# Patient Record
Sex: FEMALE | Race: WHITE | NOT HISPANIC OR LATINO
[De-identification: names, ages, dates, MRNs, and addresses within clinical notes are randomized per-mention and may not be internally consistent; named-entity substitution may affect disease eponyms.]

---

## 2018-09-10 ENCOUNTER — RESULT REVIEW (OUTPATIENT)
Age: 63
End: 2018-09-10

## 2021-01-14 ENCOUNTER — RESULT REVIEW (OUTPATIENT)
Age: 66
End: 2021-01-14

## 2021-08-11 ENCOUNTER — RESULT REVIEW (OUTPATIENT)
Age: 66
End: 2021-08-11

## 2022-07-12 ENCOUNTER — RX RENEWAL (OUTPATIENT)
Age: 67
End: 2022-07-12

## 2022-07-12 ENCOUNTER — APPOINTMENT (OUTPATIENT)
Dept: OTOLARYNGOLOGY | Facility: CLINIC | Age: 67
End: 2022-07-12

## 2022-07-12 VITALS
DIASTOLIC BLOOD PRESSURE: 73 MMHG | WEIGHT: 140 LBS | HEART RATE: 92 BPM | BODY MASS INDEX: 23.32 KG/M2 | HEIGHT: 65 IN | SYSTOLIC BLOOD PRESSURE: 137 MMHG

## 2022-07-12 DIAGNOSIS — R42 DIZZINESS AND GIDDINESS: ICD-10-CM

## 2022-07-12 DIAGNOSIS — J06.9 ACUTE UPPER RESPIRATORY INFECTION, UNSPECIFIED: ICD-10-CM

## 2022-07-12 DIAGNOSIS — Z80.42 FAMILY HISTORY OF MALIGNANT NEOPLASM OF PROSTATE: ICD-10-CM

## 2022-07-12 DIAGNOSIS — H65.02 ACUTE SEROUS OTITIS MEDIA, LEFT EAR: ICD-10-CM

## 2022-07-12 DIAGNOSIS — Z87.891 PERSONAL HISTORY OF NICOTINE DEPENDENCE: ICD-10-CM

## 2022-07-12 DIAGNOSIS — J34.2 DEVIATED NASAL SEPTUM: ICD-10-CM

## 2022-07-12 DIAGNOSIS — Z86.39 PERSONAL HISTORY OF OTHER ENDOCRINE, NUTRITIONAL AND METABOLIC DISEASE: ICD-10-CM

## 2022-07-12 DIAGNOSIS — H61.22 IMPACTED CERUMEN, LEFT EAR: ICD-10-CM

## 2022-07-12 DIAGNOSIS — J35.8 OTHER CHRONIC DISEASES OF TONSILS AND ADENOIDS: ICD-10-CM

## 2022-07-12 DIAGNOSIS — H69.83 OTHER SPECIFIED DISORDERS OF EUSTACHIAN TUBE, BILATERAL: ICD-10-CM

## 2022-07-12 DIAGNOSIS — Z78.9 OTHER SPECIFIED HEALTH STATUS: ICD-10-CM

## 2022-07-12 DIAGNOSIS — Z87.19 PERSONAL HISTORY OF OTHER DISEASES OF THE DIGESTIVE SYSTEM: ICD-10-CM

## 2022-07-12 PROCEDURE — 99204 OFFICE O/P NEW MOD 45 MIN: CPT | Mod: 25

## 2022-07-12 PROCEDURE — 92567 TYMPANOMETRY: CPT

## 2022-07-12 PROCEDURE — G0268 REMOVAL OF IMPACTED WAX MD: CPT

## 2022-07-12 PROCEDURE — 92557 COMPREHENSIVE HEARING TEST: CPT

## 2022-07-12 RX ORDER — OMEPRAZOLE 40 MG/1
40 CAPSULE, DELAYED RELEASE ORAL
Qty: 90 | Refills: 0 | Status: ACTIVE | COMMUNITY
Start: 2022-06-14

## 2022-07-12 RX ORDER — FLUDROCORTISONE ACETATE 0.1 MG/1
0.1 TABLET ORAL
Qty: 90 | Refills: 0 | Status: ACTIVE | COMMUNITY
Start: 2022-06-03

## 2022-07-12 NOTE — ADDENDUM
[FreeTextEntry1] : Documented by Jayla Arreaga acting as scribe for Dr. Aguero on 07/12/2022.\par \par All Medical record entries made by the scribe were at my, Dr. Aguero,direction and personally dictated by me on 07/12/2022. I have reviewed the chart and agree that the record accurately reflects my personal performance of the history, physical exam, assessment and plan. I have also personally directed, reviewed, and agreed with the discharge instructions.

## 2022-07-12 NOTE — CONSULT LETTER
[Dear  ___] : Dear  [unfilled], [Consult Letter:] : I had the pleasure of evaluating your patient, [unfilled]. [Please see my note below.] : Please see my note below. [Consult Closing:] : Thank you very much for allowing me to participate in the care of this patient.  If you have any questions, please do not hesitate to contact me. [Sincerely,] : Sincerely, [FreeTextEntry3] : Alan Aguero MD FACS

## 2022-07-12 NOTE — HISTORY OF PRESENT ILLNESS
[de-identified] : Pt notes 4 nights ago, pt had sore throat and fever. Pt notes she went to Urgent care 2 days ago - covid negative, flu negative, still waiting for strep results. Pt notes she coughs up thick brown/green mucus in the morning. Pt notes on her left tonsil there is a white lump. Pt notes she has pain when she swallows. Pt notes her ears are popping a lot. Pt notes when she went to Urgent care, they told her her left ear is irritated. Pt notes when she lays on her left side she experiences vertigo.

## 2022-07-12 NOTE — PHYSICAL EXAM
[Enlarged] : enlarged [Hearing Madrid Test (Tuning Fork On Forehead)] : no lateralization of tone [Midline] : trachea located in midline position [Normal] : orientation to person, place, and time: normal [FreeTextEntry8] : minimal cerumen removed via curettage  [FreeTextEntry9] : cerumen impaction removed via curettage  [de-identified] : fluid present [FreeTextEntry1] : deviated septum\par inflamed turbs l>r [de-identified] : left tonsil large cyst [FreeTextEntry2] : sinuses nontender to percussion.  [de-identified] : CHAN

## 2022-07-12 NOTE — ASSESSMENT
[FreeTextEntry1] : Reviewed and reconciled medications, allergies, PMHx, PSHx, SocHx, FMHx.\par \par c/o sore throat, fever, vertigo\par cerumen removed b/l - left impacted\par deviated septum\par inflamed turbs l>r\par large cyst coming off left tonsil\par mild TMJ\par submandibular glands enlarged r>l\par \par Audio:\par Tymps: Type Ad AD, type A AS\par ETF: ETD AU\par Audio: AD- WNL to mod SNHL 250-8000 Hz, AS- WNL to mild to WNL to mild SNHL 250-8000 Hz\par AD: 100% discrim at 70db, TPP -74\par AS: 92% discrim at 65db, TPP -65\par \par Plan:\par cerumen removed b/l. Audio - results interpreted by Dr. Aguero and reviewed with the patient. Start Azithromycin - 1 pill a day for 7 days. Azelastine - 2 sprays bilaterally BID, spray laterally. Flonase - 2 sprays bilaterally QD, spray laterally. FU prn.

## 2022-07-12 NOTE — DATA REVIEWED
[de-identified] : Tymps: Type Ad AD, type A AS\par ETF: ETD AU\par Audio: AD- WNL to mod SNHL 250-8000 Hz, AS- WNL to mild to WNL to mild SNHL 250-8000 Hz\par Recs: 1. ENT f/u, 2. Re-eval as per MD

## 2022-07-12 NOTE — REVIEW OF SYSTEMS
[Post Nasal Drip] : post nasal drip [Nasal Congestion] : nasal congestion [Hoarseness] : hoarseness [Throat Pain] : throat pain [FreeTextEntry1] : headache

## 2022-10-06 ENCOUNTER — NON-APPOINTMENT (OUTPATIENT)
Age: 67
End: 2022-10-06

## 2022-11-07 ENCOUNTER — NON-APPOINTMENT (OUTPATIENT)
Age: 67
End: 2022-11-07

## 2022-11-07 ENCOUNTER — APPOINTMENT (OUTPATIENT)
Dept: OPHTHALMOLOGY | Facility: CLINIC | Age: 67
End: 2022-11-07

## 2022-11-07 PROCEDURE — 99204 OFFICE O/P NEW MOD 45 MIN: CPT

## 2022-11-07 PROCEDURE — 92060 SENSORIMOTOR EXAMINATION: CPT

## 2022-11-07 PROCEDURE — 92015 DETERMINE REFRACTIVE STATE: CPT

## 2023-04-18 ENCOUNTER — RX ONLY (RX ONLY)
Age: 68
End: 2023-04-18

## 2023-04-18 ENCOUNTER — OFFICE (OUTPATIENT)
Dept: URBAN - METROPOLITAN AREA CLINIC 109 | Facility: CLINIC | Age: 68
Setting detail: OPHTHALMOLOGY
End: 2023-04-18
Payer: MEDICARE

## 2023-04-18 DIAGNOSIS — H00.14: ICD-10-CM

## 2023-04-18 PROCEDURE — 99203 OFFICE O/P NEW LOW 30 MIN: CPT | Performed by: OPHTHALMOLOGY

## 2023-04-18 ASSESSMENT — VISUAL ACUITY
OD_BCVA: 20/25+3
OS_BCVA: 20/25+2

## 2023-04-18 ASSESSMENT — CONFRONTATIONAL VISUAL FIELD TEST (CVF)
OS_FINDINGS: FULL
OD_FINDINGS: FULL

## 2023-04-18 ASSESSMENT — TONOMETRY
OD_IOP_MMHG: 18
OS_IOP_MMHG: 18

## 2023-05-01 ENCOUNTER — OFFICE (OUTPATIENT)
Dept: URBAN - METROPOLITAN AREA CLINIC 109 | Facility: CLINIC | Age: 68
Setting detail: OPHTHALMOLOGY
End: 2023-05-01
Payer: MEDICARE

## 2023-05-01 DIAGNOSIS — H00.14: ICD-10-CM

## 2023-05-01 DIAGNOSIS — H02.89: ICD-10-CM

## 2023-05-01 PROCEDURE — 99213 OFFICE O/P EST LOW 20 MIN: CPT | Performed by: OPHTHALMOLOGY

## 2023-05-01 ASSESSMENT — VISUAL ACUITY
OD_BCVA: 20/25-1
OS_BCVA: 20/25-2

## 2023-05-01 ASSESSMENT — TONOMETRY: OS_IOP_MMHG: 17

## 2023-05-01 ASSESSMENT — CONFRONTATIONAL VISUAL FIELD TEST (CVF)
OS_FINDINGS: FULL
OD_FINDINGS: FULL

## 2023-05-15 ENCOUNTER — OFFICE (OUTPATIENT)
Dept: URBAN - METROPOLITAN AREA CLINIC 109 | Facility: CLINIC | Age: 68
Setting detail: OPHTHALMOLOGY
End: 2023-05-15
Payer: MEDICARE

## 2023-05-15 DIAGNOSIS — H00.14: ICD-10-CM

## 2023-05-15 DIAGNOSIS — H02.89: ICD-10-CM

## 2023-05-15 PROCEDURE — 99213 OFFICE O/P EST LOW 20 MIN: CPT | Performed by: OPHTHALMOLOGY

## 2023-05-15 ASSESSMENT — VISUAL ACUITY
OD_BCVA: 20/25-1
OS_BCVA: 20/25-2

## 2023-05-15 ASSESSMENT — CONFRONTATIONAL VISUAL FIELD TEST (CVF)
OD_FINDINGS: FULL
OS_FINDINGS: FULL

## 2023-05-15 ASSESSMENT — TONOMETRY: OS_IOP_MMHG: 17

## 2023-06-02 ENCOUNTER — TELEHEALTH (OUTPATIENT)
Dept: URBAN - METROPOLITAN AREA CLINIC 109 | Facility: CLINIC | Age: 68
Setting detail: OPHTHALMOLOGY
End: 2023-06-02
Payer: MEDICARE

## 2023-06-02 DIAGNOSIS — H00.14: ICD-10-CM

## 2023-06-02 DIAGNOSIS — H02.89: ICD-10-CM

## 2023-06-02 PROCEDURE — 99213 OFFICE O/P EST LOW 20 MIN: CPT | Performed by: OPHTHALMOLOGY

## 2023-06-02 ASSESSMENT — TONOMETRY
OD_IOP_MMHG: 15
OS_IOP_MMHG: 15

## 2023-06-02 ASSESSMENT — VISUAL ACUITY
OS_BCVA: 20/20-2
OD_BCVA: 20/20

## 2023-06-02 ASSESSMENT — CONFRONTATIONAL VISUAL FIELD TEST (CVF)
OD_FINDINGS: FULL
OS_FINDINGS: FULL

## 2023-07-20 ENCOUNTER — INPATIENT (INPATIENT)
Facility: HOSPITAL | Age: 68
LOS: 1 days | Discharge: HOME | DRG: 309 | End: 2023-07-22
Attending: INTERNAL MEDICINE | Admitting: INTERNAL MEDICINE
Payer: MEDICARE

## 2023-07-20 VITALS
DIASTOLIC BLOOD PRESSURE: 104 MMHG | WEIGHT: 160.06 LBS | TEMPERATURE: 98 F | OXYGEN SATURATION: 98 % | SYSTOLIC BLOOD PRESSURE: 152 MMHG | HEART RATE: 186 BPM | RESPIRATION RATE: 16 BRPM

## 2023-07-20 DIAGNOSIS — Z98.890 OTHER SPECIFIED POSTPROCEDURAL STATES: Chronic | ICD-10-CM

## 2023-07-20 DIAGNOSIS — Z98.89 OTHER SPECIFIED POSTPROCEDURAL STATES: Chronic | ICD-10-CM

## 2023-07-20 DIAGNOSIS — Z86.39 PERSONAL HISTORY OF OTHER ENDOCRINE, NUTRITIONAL AND METABOLIC DISEASE: ICD-10-CM

## 2023-07-20 DIAGNOSIS — I48.91 UNSPECIFIED ATRIAL FIBRILLATION: ICD-10-CM

## 2023-07-20 DIAGNOSIS — Z29.9 ENCOUNTER FOR PROPHYLACTIC MEASURES, UNSPECIFIED: ICD-10-CM

## 2023-07-20 LAB
ALBUMIN SERPL ELPH-MCNC: 3.8 G/DL — SIGNIFICANT CHANGE UP (ref 3.3–5)
ALP SERPL-CCNC: 54 U/L — SIGNIFICANT CHANGE UP (ref 40–120)
ALT FLD-CCNC: 35 U/L — SIGNIFICANT CHANGE UP (ref 12–78)
ANION GAP SERPL CALC-SCNC: 8 MMOL/L — SIGNIFICANT CHANGE UP (ref 5–17)
AST SERPL-CCNC: 27 U/L — SIGNIFICANT CHANGE UP (ref 15–37)
BASOPHILS # BLD AUTO: 0.06 K/UL — SIGNIFICANT CHANGE UP (ref 0–0.2)
BASOPHILS NFR BLD AUTO: 0.8 % — SIGNIFICANT CHANGE UP (ref 0–2)
BILIRUB SERPL-MCNC: 0.5 MG/DL — SIGNIFICANT CHANGE UP (ref 0.2–1.2)
BUN SERPL-MCNC: 18 MG/DL — SIGNIFICANT CHANGE UP (ref 7–23)
CALCIUM SERPL-MCNC: 9 MG/DL — SIGNIFICANT CHANGE UP (ref 8.5–10.1)
CHLORIDE SERPL-SCNC: 105 MMOL/L — SIGNIFICANT CHANGE UP (ref 96–108)
CO2 SERPL-SCNC: 23 MMOL/L — SIGNIFICANT CHANGE UP (ref 22–31)
CREAT SERPL-MCNC: 1.2 MG/DL — SIGNIFICANT CHANGE UP (ref 0.5–1.3)
EGFR: 50 ML/MIN/1.73M2 — LOW
EOSINOPHIL # BLD AUTO: 0.01 K/UL — SIGNIFICANT CHANGE UP (ref 0–0.5)
EOSINOPHIL NFR BLD AUTO: 0.1 % — SIGNIFICANT CHANGE UP (ref 0–6)
GLUCOSE SERPL-MCNC: 219 MG/DL — HIGH (ref 70–99)
HCT VFR BLD CALC: 44.2 % — SIGNIFICANT CHANGE UP (ref 34.5–45)
HGB BLD-MCNC: 15.6 G/DL — HIGH (ref 11.5–15.5)
IMM GRANULOCYTES NFR BLD AUTO: 0.5 % — SIGNIFICANT CHANGE UP (ref 0–0.9)
LYMPHOCYTES # BLD AUTO: 2.29 K/UL — SIGNIFICANT CHANGE UP (ref 1–3.3)
LYMPHOCYTES # BLD AUTO: 30 % — SIGNIFICANT CHANGE UP (ref 13–44)
MAGNESIUM SERPL-MCNC: 2.2 MG/DL — SIGNIFICANT CHANGE UP (ref 1.6–2.6)
MCHC RBC-ENTMCNC: 31.8 PG — SIGNIFICANT CHANGE UP (ref 27–34)
MCHC RBC-ENTMCNC: 35.3 GM/DL — SIGNIFICANT CHANGE UP (ref 32–36)
MCV RBC AUTO: 90 FL — SIGNIFICANT CHANGE UP (ref 80–100)
MONOCYTES # BLD AUTO: 0.48 K/UL — SIGNIFICANT CHANGE UP (ref 0–0.9)
MONOCYTES NFR BLD AUTO: 6.3 % — SIGNIFICANT CHANGE UP (ref 2–14)
NEUTROPHILS # BLD AUTO: 4.75 K/UL — SIGNIFICANT CHANGE UP (ref 1.8–7.4)
NEUTROPHILS NFR BLD AUTO: 62.3 % — SIGNIFICANT CHANGE UP (ref 43–77)
NRBC # BLD: 0 /100 WBCS — SIGNIFICANT CHANGE UP (ref 0–0)
PLATELET # BLD AUTO: 296 K/UL — SIGNIFICANT CHANGE UP (ref 150–400)
POTASSIUM SERPL-MCNC: 3.4 MMOL/L — LOW (ref 3.5–5.3)
POTASSIUM SERPL-SCNC: 3.4 MMOL/L — LOW (ref 3.5–5.3)
PROT SERPL-MCNC: 7.5 G/DL — SIGNIFICANT CHANGE UP (ref 6–8.3)
RBC # BLD: 4.91 M/UL — SIGNIFICANT CHANGE UP (ref 3.8–5.2)
RBC # FLD: 11.8 % — SIGNIFICANT CHANGE UP (ref 10.3–14.5)
SODIUM SERPL-SCNC: 136 MMOL/L — SIGNIFICANT CHANGE UP (ref 135–145)
TROPONIN I, HIGH SENSITIVITY RESULT: 7.1 NG/L — SIGNIFICANT CHANGE UP
WBC # BLD: 7.63 K/UL — SIGNIFICANT CHANGE UP (ref 3.8–10.5)
WBC # FLD AUTO: 7.63 K/UL — SIGNIFICANT CHANGE UP (ref 3.8–10.5)

## 2023-07-20 PROCEDURE — 99223 1ST HOSP IP/OBS HIGH 75: CPT | Mod: GC,AI

## 2023-07-20 PROCEDURE — 71045 X-RAY EXAM CHEST 1 VIEW: CPT | Mod: 26

## 2023-07-20 PROCEDURE — 99223 1ST HOSP IP/OBS HIGH 75: CPT

## 2023-07-20 PROCEDURE — 93010 ELECTROCARDIOGRAM REPORT: CPT

## 2023-07-20 PROCEDURE — 99285 EMERGENCY DEPT VISIT HI MDM: CPT

## 2023-07-20 RX ORDER — ONDANSETRON 8 MG/1
4 TABLET, FILM COATED ORAL EVERY 8 HOURS
Refills: 0 | Status: DISCONTINUED | OUTPATIENT
Start: 2023-07-20 | End: 2023-07-22

## 2023-07-20 RX ORDER — DILTIAZEM HCL 120 MG
10 CAPSULE, EXT RELEASE 24 HR ORAL
Qty: 125 | Refills: 0 | Status: DISCONTINUED | OUTPATIENT
Start: 2023-07-20 | End: 2023-07-21

## 2023-07-20 RX ORDER — POTASSIUM CHLORIDE 20 MEQ
10 PACKET (EA) ORAL
Refills: 0 | Status: COMPLETED | OUTPATIENT
Start: 2023-07-20 | End: 2023-07-20

## 2023-07-20 RX ORDER — LANOLIN ALCOHOL/MO/W.PET/CERES
3 CREAM (GRAM) TOPICAL AT BEDTIME
Refills: 0 | Status: DISCONTINUED | OUTPATIENT
Start: 2023-07-20 | End: 2023-07-22

## 2023-07-20 RX ORDER — HYDROCORTISONE 20 MG
20 TABLET ORAL AT BEDTIME
Refills: 0 | Status: COMPLETED | OUTPATIENT
Start: 2023-07-20 | End: 2023-07-20

## 2023-07-20 RX ORDER — ACETAMINOPHEN 500 MG
650 TABLET ORAL EVERY 6 HOURS
Refills: 0 | Status: DISCONTINUED | OUTPATIENT
Start: 2023-07-20 | End: 2023-07-21

## 2023-07-20 RX ORDER — SODIUM CHLORIDE 9 MG/ML
1000 INJECTION INTRAMUSCULAR; INTRAVENOUS; SUBCUTANEOUS ONCE
Refills: 0 | Status: COMPLETED | OUTPATIENT
Start: 2023-07-20 | End: 2023-07-20

## 2023-07-20 RX ORDER — DILTIAZEM HCL 120 MG
10 CAPSULE, EXT RELEASE 24 HR ORAL ONCE
Refills: 0 | Status: COMPLETED | OUTPATIENT
Start: 2023-07-20 | End: 2023-07-20

## 2023-07-20 RX ORDER — FLUDROCORTISONE ACETATE 0.1 MG/1
0.05 TABLET ORAL DAILY
Refills: 0 | Status: DISCONTINUED | OUTPATIENT
Start: 2023-07-21 | End: 2023-07-22

## 2023-07-20 RX ORDER — DILTIAZEM HCL 120 MG
12 CAPSULE, EXT RELEASE 24 HR ORAL
Qty: 125 | Refills: 0 | Status: DISCONTINUED | OUTPATIENT
Start: 2023-07-20 | End: 2023-07-20

## 2023-07-20 RX ORDER — PANTOPRAZOLE SODIUM 20 MG/1
40 TABLET, DELAYED RELEASE ORAL
Refills: 0 | Status: DISCONTINUED | OUTPATIENT
Start: 2023-07-20 | End: 2023-07-22

## 2023-07-20 RX ORDER — METOPROLOL TARTRATE 50 MG
25 TABLET ORAL EVERY 8 HOURS
Refills: 0 | Status: DISCONTINUED | OUTPATIENT
Start: 2023-07-20 | End: 2023-07-21

## 2023-07-20 RX ORDER — HYDROCORTISONE 20 MG
20 TABLET ORAL DAILY
Refills: 0 | Status: DISCONTINUED | OUTPATIENT
Start: 2023-07-21 | End: 2023-07-22

## 2023-07-20 RX ORDER — POTASSIUM CHLORIDE 20 MEQ
40 PACKET (EA) ORAL ONCE
Refills: 0 | Status: COMPLETED | OUTPATIENT
Start: 2023-07-20 | End: 2023-07-20

## 2023-07-20 RX ORDER — HYDROCORTISONE 20 MG
10 TABLET ORAL AT BEDTIME
Refills: 0 | Status: DISCONTINUED | OUTPATIENT
Start: 2023-07-21 | End: 2023-07-21

## 2023-07-20 RX ADMIN — Medication 100 MILLIEQUIVALENT(S): at 17:55

## 2023-07-20 RX ADMIN — Medication 20 MILLIGRAM(S): at 22:24

## 2023-07-20 RX ADMIN — Medication 12 MG/HR: at 17:13

## 2023-07-20 RX ADMIN — Medication 10 MILLIGRAM(S): at 17:20

## 2023-07-20 RX ADMIN — Medication 10 MG/HR: at 21:01

## 2023-07-20 RX ADMIN — Medication 100 MILLIEQUIVALENT(S): at 16:38

## 2023-07-20 RX ADMIN — Medication 40 MILLIEQUIVALENT(S): at 16:39

## 2023-07-20 RX ADMIN — Medication 10 MILLIGRAM(S): at 16:02

## 2023-07-20 RX ADMIN — Medication 100 MILLIEQUIVALENT(S): at 19:01

## 2023-07-20 RX ADMIN — SODIUM CHLORIDE 1000 MILLILITER(S): 9 INJECTION INTRAMUSCULAR; INTRAVENOUS; SUBCUTANEOUS at 16:13

## 2023-07-20 RX ADMIN — Medication 10 MG/HR: at 21:30

## 2023-07-20 RX ADMIN — Medication 3 MILLIGRAM(S): at 22:24

## 2023-07-20 RX ADMIN — Medication 2 MILLIGRAM(S): at 16:13

## 2023-07-20 NOTE — ED ADULT NURSE NOTE - MUSCLE PAIN OR WEAKNESS
08/01/19        Ashley Cavazos  [address] [facility]      Dear Orquidea Lara,    [facility] records indicate that you have outstanding lab work and or testing that was ordered for you and has not yet been completed:  Orders Placed This Encounter
no

## 2023-07-20 NOTE — H&P ADULT - NSHPREVIEWOFSYSTEMS_GEN_ALL_CORE
CONSTITUTIONAL: denies fever, chills,  + lightheadedness  HEENT: denies blurred vision, sore throat  SKIN: denies new lesions, rash  CARDIOVASCULAR: denies chest pain, chest pressure, + palpitations  RESPIRATORY: denies shortness of breath, sputum production  GASTROINTESTINAL: denies nausea, vomiting, diarrhea, + right sided abd pain  GENITOURINARY: denies dysuria, discharge  NEUROLOGICAL: denies numbness, headache, focal weakness  MUSCULOSKELETAL: denies new joint pain, muscle aches  HEMATOLOGIC: denies gross bleeding, bruising  LYMPHATICS: denies extremity swelling

## 2023-07-20 NOTE — ED PROVIDER NOTE - OBJECTIVE STATEMENT
Patient is a 67-year-old white female with history of Oakland's disease as well as hyperthyroidism now euthyroid after being treated with methimazole presenting to the emergency department here at [facility] today complaining of palpitations and lightheadedness.  Yesterday patient underwent a vigorous bowel prep for a colonoscopy that was performed this morning.  Colonoscopy went well and when arrived back home she developed little crampy right lower abdominal pain which was followed by palpitations dizziness and lightheadedness.  There was no chest pain no shortness of breath no syncope.  Patient checked her heart rate noted to be markedly elevated called EMS for assistance.  Upon their arrival patient was placed on a cardiac monitor EKG was performed which they felt revealed an SVT so adenosine 6 mg IV was administered with no success.  On arrival in the emergency department here EKG was reviewed and it was determined that SVT was not present but that patient was in fact in atrial fibrillation.

## 2023-07-20 NOTE — H&P ADULT - ASSESSMENT
67y F pmhx addisons disease, hyperthyroidism now euthyroid s/p methimazole, colonic polyps, admitted for new onset afib with RVR.       was BIBEMS to the ED for sudden onset palpitations associated with lightheadedness. Patient had colonoscopy this am that finished ~11am. She then went to diner and had coffee with a friend. Around 130pm she began to have right sided abdominal pain followed by palpitations and lightheadedness. She called EMS who gave her adenosine 6mg x 1 for suspected SVT while en route to the ED.     ED Course  Vitals: 98.4F, 152/104, 98% RA  Labs: cbc wnl, K 3.4 cmp otherwise wnl, trop negative  EKG: afib with rvr 157 bpm  Received: Cardizem 10mg IV x3, ativan 2mg IV, 1L IVF NS, started on cardizem gtt 67y F pmhx addisons disease, hyperthyroidism now euthyroid s/p methimazole, colonic polyps, admitted for new onset afib with RVR.

## 2023-07-20 NOTE — H&P ADULT - ATTENDING COMMENTS
67y F pmhx addisons disease, hyperthyroidism now euthyroid s/p methimazole, colonic polyps, admitted for new onset afib with RVR    Pt started on cardizem gtt and metoprolol tid, will monitor on tele, check TTE and TFTs, f/u cards recs, and hold off on AC given recent colonoscopy.    Paddy Mcgovern, Attending Physician

## 2023-07-20 NOTE — ED PROVIDER NOTE - CONSTITUTIONAL, MLM
normal... Anxious appearing white female, awake, alert, oriented to person, place, time/situation and in mild apparent distress.

## 2023-07-20 NOTE — ED ADULT NURSE NOTE - OBJECTIVE STATEMENT
Patient BIBA from home complaining of rapid heart rate and palpitations with right flank pain, states had a colonoscopy earlier today. Patient is AOx4, safety precautions in place, awaiting evaluation

## 2023-07-20 NOTE — H&P ADULT - NSHPPHYSICALEXAM_GEN_ALL_CORE
T(C): 36.9 (07-20-23 @ 15:30), Max: 36.9 (07-20-23 @ 15:30)  HR: 106 (07-20-23 @ 17:20) (106 - 186)  BP: 117/57 (07-20-23 @ 17:20) (117/57 - 152/104)  RR: 16 (07-20-23 @ 17:20) (16 - 16)  SpO2: 98% (07-20-23 @ 17:20) (98% - 98%)    GENERAL: patient appears well, no acute distress, appropriate, pleasant  ENMT: moist mucous membranes  LUNGS: cta b/l   HEART: s1s2, irregularly irregular, no murmur, no lower extremity edema  GASTROINTESTINAL: abdomen is soft, nontender, nondistended  INTEGUMENT: good skin turgor, warm skin, appears well perfused  MUSCULOSKELETAL: no clubbing or cyanosis, no obvious deformity  NEUROLOGIC: awake, alert, oriented x3  HEME/LYMPH: no obvious ecchymosis or petechiae

## 2023-07-20 NOTE — ED PROVIDER NOTE - NS_EDPROVIDERDISPOUSERTYPE_ED_A_ED
EMG report faxed to Dr Yousif Travis at [phone] per patient request 
Attending Attestation (For Attendings USE Only)...

## 2023-07-20 NOTE — CONSULT NOTE ADULT - ASSESSMENT
Assessment/Plan: This is 66 y/o F with no significant medical/surgical history except Pima's disease and GERD, s/p colonoscopy today (7/20) presented to the ED c/o palpitation.  Patient states, after colonoscopy, her and her friend went to a diner to eat.  She had 2 cups of coffee then.  c/o palpitations, dizziness, and near syncope, found by EMS in "SVT"    Cardiac Arrhythmia/new Onset Afib  - No known history of arrhythmia or ischemic disease  - Had seen a cardiologist for palpitations in the past; was placed on monitor but did unrevealing  - Rhythm strips from EMS showed Afib with RVR, rates 150's, given Adenosine but did not break  - s/p Cardizem 10 mg IV x 1 in ED.  rate slightly improved to 100's-130's  - Would start Cardizem gtt at 5 mg/hr.  Can uptitrate to 15 mg/hr as needed  - Would start Lopressor 25 mg q12H  - Would hold off on AC, hoping that this is a transient Afib.  If persists >24 hrs, would start DOAC  - Had reportedly normal stress test and TTE few years ago.  Would obtain TTE here to assess cardiac structures  - Obtain TSH  - Admit to tele    - Her EKG is non-ischemic  - No anginal complaints    - No evidence of volume overload  - Non-orthopneic with no O2 requirement    - Monitor electrolytes, replete to keep K>4 and Mag>2  - Will continue to follow    Emma Tobias DNP, NP-C, AGACNP-C  Cardiology  Call TEAMS     Assessment/Plan: This is 68 y/o F with no significant medical/surgical history except Benson's disease and GERD, s/p colonoscopy today (7/20) presented to the ED c/o palpitation.  Patient states, after colonoscopy, her and her friend went to a diner to eat.  She had 2 cups of coffee then.  c/o palpitations, dizziness, and near syncope, found by EMS in "SVT"    Cardiac Arrhythmia/new Onset Afib  - No known history of arrhythmia or ischemic disease  - Had seen a cardiologist for palpitations in the past; was placed on monitor but was unrevealing  - Rhythm strips from EMS showed Afib with RVR, rates 150's, given Adenosine but did not break  - s/p Cardizem 10 mg IV x 1 in ED.  rate slightly improved to 100's-130's  - Would start Cardizem gtt at 5 mg/hr.  Can uptitrate to 15 mg/hr as needed  - Would start Lopressor 25 mg q8H  - Would hold off on AC, hoping that this is a transient Afib.  If persists >24 hrs, would start DOAC  - Had reportedly normal stress test and TTE few years ago.  Would obtain TTE here to assess cardiac structures  - Obtain TSH  - Admit to tele    - Her EKG is non-ischemic  - No anginal complaints    - No evidence of volume overload  - Non-orthopneic with no O2 requirement    - Monitor electrolytes, replete to keep K>4 and Mag>2  - Will continue to follow    Emma Tobias DNP, NP-C, AGACNP-C  Cardiology  Call TEAMS

## 2023-07-20 NOTE — H&P ADULT - PROBLEM SELECTOR PLAN 1
Patient presented for sudden onset palpitations and lightdeadedness after colonoscopy  - EKG Afib with RVR  - Received Cardizem 10mg IV x3 and started on cardizem gtt in ED  - continue cardizem gtt with goal HR < 110  - start lopressor 25mg q8h with hold parameters  - will hold AC for now given BRBPR and monitor on remote tele  - if continues in afib for 24 hours will start AC  - f/u TSH and TTE; keep K>4, Mg >2  - Cardiology consulted Dr. Goodwin recs appreciated

## 2023-07-20 NOTE — ED PROVIDER NOTE - CLINICAL SUMMARY MEDICAL DECISION MAKING FREE TEXT BOX
Palpitations and lightheadedness after colonoscopy requiring thorough evaluation labs EKG IV fluids and medications for rate control and probably for rhythm conversion.

## 2023-07-20 NOTE — ED PROVIDER NOTE - DIFFERENTIAL DIAGNOSIS
Differential diagnosis of atrial fibrillation atrial flutter most likely secondary to dehydration versus electrolyte abnormalities versus ischemia doubt Differential Diagnosis

## 2023-07-20 NOTE — CONSULT NOTE ADULT - NS ATTEND AMEND GEN_ALL_CORE FT
history of hyperthyroidism in the past, had colonoscopy today, and reported palpitations/dyspnea when she got home  found to be in af with rvr  cardizem gtt  po lopressor as above  she reports BRBPR when she got home, and will hold off on ac today  echocardiogram  check thyroid function  replete electrolytes

## 2023-07-20 NOTE — H&P ADULT - HISTORY OF PRESENT ILLNESS
67y F pmhx addisons disease, hyperthyroidism now euthyroid s/p methimazole, colonic polyps, was BIBEMS to the ED for sudden onset palpitations associated with lightheadedness. Patient had colonoscopy this am that finished ~11am. She then went to diner and had coffee with a friend. Around 130pm she began to have right sided abdominal pain followed by palpitations and lightheadedness. She called EMS who gave her adenosine 6mg x 1 for suspected SVT while en route to the ED.     ED Course  Vitals: 98.4F, 152/104, 98% RA  Labs: cbc wnl, K 3.4 cmp otherwise wnl, trop negative  EKG: afib with rvr 157 bpm  Received: Cardizem 10mg IV x3, ativan 2mg IV, 1L IVF NS, started on cardizem gtt

## 2023-07-20 NOTE — H&P ADULT - NSHPSOCIALHISTORY_GEN_ALL_CORE
No smoking or alcohol use  Lives with family  ADL independent  Active and ambulates without assistance

## 2023-07-20 NOTE — ED PROVIDER NOTE - PROGRESS NOTE DETAILS
After receiving 2 aliquots of diltiazem 10 mg IV 10 minutes apart heart rate went down to 110-120.  Brief observation took place and patient's heart rate was trending upwards to approximately 1/21/1928 so at that point an additional 10 mg IV push of diltiazem was administered followed by an infusion of Cardizem at 12 mg an hour.  Of already discussed the case with Dr. Richie Goodwin cardiology who will see the patient in consultation in the emergency department.

## 2023-07-20 NOTE — ED ADULT NURSE NOTE - NSFALLUNIVINTERV_ED_ALL_ED
Bed/Stretcher in lowest position, wheels locked, appropriate side rails in place/Call bell, personal items and telephone in reach/Instruct patient to call for assistance before getting out of bed/chair/stretcher/Non-slip footwear applied when patient is off stretcher/[place] to call system/Physically safe environment - no spills, clutter or unnecessary equipment/Purposeful proactive rounding/Room/bathroom lighting operational, light cord in reach

## 2023-07-20 NOTE — CONSULT NOTE ADULT - SUBJECTIVE AND OBJECTIVE BOX
[facility] - Kimi Alvarez, Lei Garrido Savella, Goodger  Office Number: [phone]    Initial Consult Note    CHIEF COMPLAINT: Patient is a 67y old  Female who presents with a chief complaint of     HPI:  This is 66 y/o F with no significant medical/surgical history s/p colonoscopy (7/20) presented to the ED c/o palpitation.      PAST MEDICAL & SURGICAL HISTORY:    SOCIAL HISTORY:  No tobacco, ethanol, or drug abuse.  FAMILY HISTORY:    No family history of acute MI or sudden cardiac death.  MEDICATIONS  (STANDING):  diltiazem Infusion 12 mG/Hr (12 mL/Hr) IV Continuous <Continuous>  diltiazem Injectable 10 milliGRAM(s) IV Push once  potassium chloride    Tablet ER 40 milliEquivalent(s) Oral once  potassium chloride  10 mEq/100 mL IVPB 10 milliEquivalent(s) IV Intermittent every 1 hour    MEDICATIONS  (PRN):    Allergies    IV Contrast (Hives)  NSAIDs (Hives)    Intolerances    REVIEW OF SYSTEMS:  CONSTITUTIONAL: No weakness, fevers or chills  EYES/ENT: No visual changes;  No vertigo or throat pain   NECK: No pain or stiffness  RESPIRATORY: No cough, wheezing, hemoptysis; No shortness of breath  CARDIOVASCULAR: No chest pain or palpitations  GASTROINTESTINAL: No abdominal pain. No nausea, vomiting, or hematemesis; No diarrhea or constipation. No melena or hematochezia.  GENITOURINARY: No dysuria, frequency or hematuria  NEUROLOGICAL: No numbness or weakness  SKIN: No itching or rash  All other review of systems is negative unless indicated above  VITAL SIGNS:   Vital Signs Last 24 Hrs  T(C): 36.9 (20 Jul 2023 15:30), Max: 36.9 (20 Jul 2023 15:30)  T(F): 98.4 (20 Jul 2023 15:30), Max: 98.4 (20 Jul 2023 15:30)  HR: 186 (20 Jul 2023 15:30) (186 - 186)  BP: 152/104 (20 Jul 2023 15:30) (152/104 - 152/104)  BP(mean): --  RR: 16 (20 Jul 2023 15:30) (16 - 16)  SpO2: 98% (20 Jul 2023 15:30) (98% - 98%)    Parameters below as of 20 Jul 2023 15:30  Patient On (Oxygen Delivery Method): room air    I&O's Summary    On Exam:  Constitutional: NAD, alert and oriented x 3  Lungs:  Non-labored, breath sounds are clear bilaterally, No wheezing, rales or rhonchi  Cardiovascular: RRR.  S1 and S2 positive.  No murmurs, rubs, gallops or clicks  Gastrointestinal: Bowel Sounds present, soft, nontender.   Lymph: No peripheral edema. No cervical lymphadenopathy.  Neurological: Alert, no focal deficits  Skin: No rashes or ulcers   Psych:  Mood & affect appropriate.    LABS: All Labs Reviewed:                        15.6   7.63  )-----------( 296      ( 20 Jul 2023 15:50 )             44.2     20 Jul 2023 15:50    136    |  105    |  18     ----------------------------<  219    3.4     |  23     |  1.20     Ca    9.0        20 Jul 2023 15:50  Mg     2.2       20 Jul 2023 15:50    TPro  7.5    /  Alb  3.8    /  TBili  0.5    /  DBili  x      /  AST  27     /  ALT  35     /  AlkPhos  54     20 Jul 2023 15:50    RADIOLOGY:    EKG:    Assessment/Plan: This is 66 y/o F with no significant medical/surgical history s/p colonoscopy (7/20) presented to the ED c/o palpitation.  Was found by EMS to be in SVT    Cardiac Arrhythmia/new Onset Afib  - No known history of arrhythmia or ischemic disease      Emma Tobias DNP, NP-C, AGACNP-C  Cardiology  Call TEAMS       [facility] - Kimi Alvarez Pannella, Patel, aJmes Goodwin  Office Number: [phone]    Initial Consult Note    CHIEF COMPLAINT: Patient is a 67y old  Female who presents with a chief complaint of     HPI:  This is 66 y/o F with no significant medical/surgical history except Harsha's disease and GERD, s/p colonoscopy today (7/20) presented to the ED c/o palpitation.  Patient states, after colonoscopy, her and her friend went to a diner to eat.  she had 2 cups of coffee then.  Went home after and started feeling pain on her right flank radiating to her right shoulder/back and to her neck.  She started walking around thinking that it was gas pain.  However, she started feeling palpitations and felt that her HR was too fast.  She then became dizzy and felt she was going to pass out.  Denies SOB, FAIR or orthopnea.  Denies chest pain, diaphoresis, N/V but admits to have had blood-tinged stools post colonoscopy.  She then called the [employer].  She found to be in what was thought to be SVT and was given Adenosine 6 mg IV x 1 with no change in rate and rhythm.  Of note, she had c/o palpitation a few years ago and had seen a cardiologist.  Cardiac monitor was done which did not reveal any arrhthymias.  she reported lad a normal TTE and Stress Test in [facility] at least 2 years ago.    In the ED, she remained in Afib with RVR, rates 150's.  Given Cardizem 10 mg IV x 2 with slight improvement in HR.  Upon evaluation, patient states, she felt much better.  Tele showed still Afib with rates ranging 100'-130's.  Labs are unremarkable except for mild hypokalemia  PAST MEDICAL & SURGICAL HISTORY:    SOCIAL HISTORY:  No tobacco, ethanol, or drug abuse.  FAMILY HISTORY:    No family history of acute MI or sudden cardiac death.  MEDICATIONS  (STANDING):  diltiazem Infusion 12 mG/Hr (12 mL/Hr) IV Continuous <Continuous>  diltiazem Injectable 10 milliGRAM(s) IV Push once  potassium chloride    Tablet ER 40 milliEquivalent(s) Oral once  potassium chloride  10 mEq/100 mL IVPB 10 milliEquivalent(s) IV Intermittent every 1 hour    MEDICATIONS  (PRN):    Allergies    IV Contrast (Hives)  NSAIDs (Hives)    Intolerances    REVIEW OF SYSTEMS:  CONSTITUTIONAL: No weakness, fevers or chills  EYES/ENT: No visual changes;  No vertigo or throat pain   NECK: No pain or stiffness  RESPIRATORY: No cough, wheezing, hemoptysis; No shortness of breath  CARDIOVASCULAR: No chest pain or palpitations  GASTROINTESTINAL: No abdominal pain. No nausea, vomiting, or hematemesis; No diarrhea or constipation. No melena or hematochezia.  GENITOURINARY: No dysuria, frequency or hematuria  NEUROLOGICAL: No numbness or weakness  SKIN: No itching or rash  All other review of systems is negative unless indicated above  VITAL SIGNS:   Vital Signs Last 24 Hrs  T(C): 36.9 (20 Jul 2023 15:30), Max: 36.9 (20 Jul 2023 15:30)  T(F): 98.4 (20 Jul 2023 15:30), Max: 98.4 (20 Jul 2023 15:30)  HR: 186 (20 Jul 2023 15:30) (186 - 186)  BP: 152/104 (20 Jul 2023 15:30) (152/104 - 152/104)  BP(mean): --  RR: 16 (20 Jul 2023 15:30) (16 - 16)  SpO2: 98% (20 Jul 2023 15:30) (98% - 98%)    Parameters below as of 20 Jul 2023 15:30  Patient On (Oxygen Delivery Method): room air    I&O's Summary    On Exam:  Constitutional: NAD, alert and oriented x 3  Lungs:  Non-labored, breath sounds are clear bilaterally, No wheezing, rales or rhonchi  Cardiovascular: RRR.  S1 and S2 positive.  No murmurs, rubs, gallops or clicks  Gastrointestinal: Bowel Sounds present, soft, nontender.   Lymph: No peripheral edema. No cervical lymphadenopathy.  Neurological: Alert, no focal deficits  Skin: No rashes or ulcers   Psych:  Mood & affect appropriate.    LABS: All Labs Reviewed:                        15.6   7.63  )-----------( 296      ( 20 Jul 2023 15:50 )             44.2     20 Jul 2023 15:50    136    |  105    |  18     ----------------------------<  219    3.4     |  23     |  1.20     Ca    9.0        20 Jul 2023 15:50  Mg     2.2       20 Jul 2023 15:50    TPro  7.5    /  Alb  3.8    /  TBili  0.5    /  DBili  x      /  AST  27     /  ALT  35     /  AlkPhos  54     20 Jul 2023 15:50    RADIOLOGY:    EKG:  Afib with rates of 150's, non-ischemic

## 2023-07-20 NOTE — ED PROVIDER NOTE - CONSIDERATION OF ADMISSION OBSERVATION
We will consider admitting patient if patient fails to respond appropriately within the department while being treated and managed. Consideration of Admission/Observation

## 2023-07-20 NOTE — ED ADULT TRIAGE NOTE - CHIEF COMPLAINT QUOTE
Pt BIBA from home, s/p Colonoscopy today. Pt developed sudden onset of flank pain and palpitations. Ems upon arrival at the scene pt SVT, 6mg of adenosine given.

## 2023-07-21 ENCOUNTER — TRANSCRIPTION ENCOUNTER (OUTPATIENT)
Age: 68
End: 2023-07-21

## 2023-07-21 LAB
A1C WITH ESTIMATED AVERAGE GLUCOSE RESULT: 5.6 % — SIGNIFICANT CHANGE UP (ref 4–5.6)
ALBUMIN SERPL ELPH-MCNC: 3.4 G/DL — SIGNIFICANT CHANGE UP (ref 3.3–5)
ALP SERPL-CCNC: 49 U/L — SIGNIFICANT CHANGE UP (ref 40–120)
ALT FLD-CCNC: 29 U/L — SIGNIFICANT CHANGE UP (ref 12–78)
ANION GAP SERPL CALC-SCNC: 6 MMOL/L — SIGNIFICANT CHANGE UP (ref 5–17)
AST SERPL-CCNC: 19 U/L — SIGNIFICANT CHANGE UP (ref 15–37)
BASOPHILS # BLD AUTO: 0.07 K/UL — SIGNIFICANT CHANGE UP (ref 0–0.2)
BASOPHILS NFR BLD AUTO: 1.1 % — SIGNIFICANT CHANGE UP (ref 0–2)
BILIRUB SERPL-MCNC: 0.4 MG/DL — SIGNIFICANT CHANGE UP (ref 0.2–1.2)
BUN SERPL-MCNC: 15 MG/DL — SIGNIFICANT CHANGE UP (ref 7–23)
CALCIUM SERPL-MCNC: 8.8 MG/DL — SIGNIFICANT CHANGE UP (ref 8.5–10.1)
CHLORIDE SERPL-SCNC: 107 MMOL/L — SIGNIFICANT CHANGE UP (ref 96–108)
CO2 SERPL-SCNC: 26 MMOL/L — SIGNIFICANT CHANGE UP (ref 22–31)
CREAT SERPL-MCNC: 0.89 MG/DL — SIGNIFICANT CHANGE UP (ref 0.5–1.3)
EGFR: 72 ML/MIN/1.73M2 — SIGNIFICANT CHANGE UP
EOSINOPHIL # BLD AUTO: 0.08 K/UL — SIGNIFICANT CHANGE UP (ref 0–0.5)
EOSINOPHIL NFR BLD AUTO: 1.3 % — SIGNIFICANT CHANGE UP (ref 0–6)
ESTIMATED AVERAGE GLUCOSE: 114 MG/DL — SIGNIFICANT CHANGE UP (ref 68–114)
GLUCOSE SERPL-MCNC: 104 MG/DL — HIGH (ref 70–99)
HCT VFR BLD CALC: 44.7 % — SIGNIFICANT CHANGE UP (ref 34.5–45)
HCV AB S/CO SERPL IA: 0.14 S/CO — SIGNIFICANT CHANGE UP (ref 0–0.99)
HCV AB SERPL-IMP: SIGNIFICANT CHANGE UP
HGB BLD-MCNC: 14.6 G/DL — SIGNIFICANT CHANGE UP (ref 11.5–15.5)
IMM GRANULOCYTES NFR BLD AUTO: 0.5 % — SIGNIFICANT CHANGE UP (ref 0–0.9)
LYMPHOCYTES # BLD AUTO: 1.97 K/UL — SIGNIFICANT CHANGE UP (ref 1–3.3)
LYMPHOCYTES # BLD AUTO: 31.4 % — SIGNIFICANT CHANGE UP (ref 13–44)
MCHC RBC-ENTMCNC: 31.5 PG — SIGNIFICANT CHANGE UP (ref 27–34)
MCHC RBC-ENTMCNC: 32.7 GM/DL — SIGNIFICANT CHANGE UP (ref 32–36)
MCV RBC AUTO: 96.3 FL — SIGNIFICANT CHANGE UP (ref 80–100)
MONOCYTES # BLD AUTO: 0.31 K/UL — SIGNIFICANT CHANGE UP (ref 0–0.9)
MONOCYTES NFR BLD AUTO: 4.9 % — SIGNIFICANT CHANGE UP (ref 2–14)
NEUTROPHILS # BLD AUTO: 3.82 K/UL — SIGNIFICANT CHANGE UP (ref 1.8–7.4)
NEUTROPHILS NFR BLD AUTO: 60.8 % — SIGNIFICANT CHANGE UP (ref 43–77)
NRBC # BLD: 0 /100 WBCS — SIGNIFICANT CHANGE UP (ref 0–0)
PLATELET # BLD AUTO: 264 K/UL — SIGNIFICANT CHANGE UP (ref 150–400)
POTASSIUM SERPL-MCNC: 4.1 MMOL/L — SIGNIFICANT CHANGE UP (ref 3.5–5.3)
POTASSIUM SERPL-SCNC: 4.1 MMOL/L — SIGNIFICANT CHANGE UP (ref 3.5–5.3)
PROT SERPL-MCNC: 6.6 G/DL — SIGNIFICANT CHANGE UP (ref 6–8.3)
RBC # BLD: 4.64 M/UL — SIGNIFICANT CHANGE UP (ref 3.8–5.2)
RBC # FLD: 12.1 % — SIGNIFICANT CHANGE UP (ref 10.3–14.5)
SODIUM SERPL-SCNC: 139 MMOL/L — SIGNIFICANT CHANGE UP (ref 135–145)
T3 SERPL-MCNC: 103 NG/DL — SIGNIFICANT CHANGE UP (ref 80–200)
T4 AB SER-ACNC: 6.4 UG/DL — SIGNIFICANT CHANGE UP (ref 4.6–12)
TSH SERPL-MCNC: 1.81 UIU/ML — SIGNIFICANT CHANGE UP (ref 0.36–3.74)
WBC # BLD: 6.28 K/UL — SIGNIFICANT CHANGE UP (ref 3.8–10.5)
WBC # FLD AUTO: 6.28 K/UL — SIGNIFICANT CHANGE UP (ref 3.8–10.5)

## 2023-07-21 PROCEDURE — 93306 TTE W/DOPPLER COMPLETE: CPT | Mod: 26

## 2023-07-21 PROCEDURE — 99233 SBSQ HOSP IP/OBS HIGH 50: CPT | Mod: GC

## 2023-07-21 PROCEDURE — 99233 SBSQ HOSP IP/OBS HIGH 50: CPT

## 2023-07-21 RX ORDER — METOPROLOL TARTRATE 50 MG
1 TABLET ORAL
Qty: 90 | Refills: 0
Start: 2023-07-21 | End: 2023-08-19

## 2023-07-21 RX ORDER — METOPROLOL TARTRATE 50 MG
25 TABLET ORAL ONCE
Refills: 0 | Status: COMPLETED | OUTPATIENT
Start: 2023-07-21 | End: 2023-07-21

## 2023-07-21 RX ORDER — HYDROCORTISONE 20 MG
10 TABLET ORAL DAILY
Refills: 0 | Status: DISCONTINUED | OUTPATIENT
Start: 2023-07-21 | End: 2023-07-22

## 2023-07-21 RX ORDER — ACETAMINOPHEN 500 MG
650 TABLET ORAL EVERY 6 HOURS
Refills: 0 | Status: DISCONTINUED | OUTPATIENT
Start: 2023-07-21 | End: 2023-07-22

## 2023-07-21 RX ORDER — METOPROLOL TARTRATE 50 MG
25 TABLET ORAL EVERY 8 HOURS
Refills: 0 | Status: DISCONTINUED | OUTPATIENT
Start: 2023-07-21 | End: 2023-07-22

## 2023-07-21 RX ADMIN — Medication 25 MILLIGRAM(S): at 13:57

## 2023-07-21 RX ADMIN — PANTOPRAZOLE SODIUM 40 MILLIGRAM(S): 20 TABLET, DELAYED RELEASE ORAL at 05:26

## 2023-07-21 RX ADMIN — Medication 25 MILLIGRAM(S): at 21:28

## 2023-07-21 RX ADMIN — Medication 10 MILLIGRAM(S): at 17:42

## 2023-07-21 RX ADMIN — Medication 25 MILLIGRAM(S): at 10:03

## 2023-07-21 NOTE — PROGRESS NOTE ADULT - PROBLEM SELECTOR PLAN 1
Patient presented for sudden onset palpitations and lightheadedness after colonoscopy  - EKG showing Afib with RVR - now converted to SR  - s/p cardizem infusion  - start lopressor 25mg q8h with hold parameters  - will hold AC for now given BRBPR and monitor on remote tele  - follow up TSH  - TTE remains pending  - Cardiology consulted Dr. Buddy millers appreciated

## 2023-07-21 NOTE — CARE COORDINATION ASSESSMENT. - OTHER PERTINENT REFERRAL INFORMATION
CM met with patient  at bedside to explain role and transitions of care. Patient lives alone with her 2 cats in a private apartment with 12 steps to get in.  Patient was fully independent prior to admission.  No caregiver identified (self), no home care or DMEs. Son will transport patient home when ready to be discharge.  No needs identified at this moment. All questions answered to the best of my abilities.  CM remains available throughout the hospital stay.

## 2023-07-21 NOTE — DISCHARGE NOTE PROVIDER - HOSPITAL COURSE
ADMISSION DATE:  07-20-23    ---  FROM ADMISSION H+P:   HPI:  67y F pmhx addisons disease, hyperthyroidism now euthyroid s/p methimazole, colonic polyps, was BIBEMS to the ED for sudden onset palpitations associated with lightheadedness. Patient had colonoscopy this am that finished ~11am. She then went to diner and had coffee with a friend. Around 130pm she began to have right sided abdominal pain followed by palpitations and lightheadedness. She called EMS who gave her adenosine 6mg x 1 for suspected SVT while en route to the ED.     ED Course  Vitals: 98.4F, 152/104, 98% RA  Labs: cbc wnl, K 3.4 cmp otherwise wnl, trop negative  EKG: afib with rvr 157 bpm  Received: Cardizem 10mg IV x3, ativan 2mg IV, 1L IVF NS, started on cardizem gtt (20 Jul 2023 18:25)      ---  HOSPITAL COURSE/PERTINENT LABS/PROCEDURES PERFORMED/PENDING TESTS:  The patient presented with palpitations following a colonoscopy procedure, and was admitted for atrial fibrillation with rvr. Patient was treated with cardizem IVP and a cardizem gtt with conversion to sinus rhythm but subsequent development of soft blood pressures. The patient was started on metoprolol succinate by cardiology, to be continued upon discharge. The patient was in known atrial fibrillation for <24 hours so cardiology did not recommend starting anticoagulation; however, patient's CHADsVASc score=2. Patient had a TTE performed inpatient. Patient was continued on her home fludrocortisone and hydrocortisone for noni's disease. The patient will follow up with cardiology outpatient following discharge for further management and workup as appropriate for her new diagnosis of paroxysmal atrial fibrillation.    The patient was seen and examined on the day of discharge. The patient is medically optimized for discharge to home with outpatient cardiology follow up.    ---  PATIENT CONDITION:  - stable    ---  PHYSICAL EXAM ON DAY OF DISCHARGE:  T(C): 36.9 (07-21-23 @ 09:56), Max: 36.9 (07-21-23 @ 09:56)  HR: 76 (07-21-23 @ 13:55) (67 - 88)  BP: 106/67 (07-21-23 @ 13:55) (84/56 - 121/58)  RR: 16 (07-21-23 @ 09:56) (16 - 18)  SpO2: 83% (07-21-23 @ 09:56) (83% - 98%)    gen: appears stated age, NAD  heent: perrl, mmm  resp: cta b/l, no wheezes, rales or rhonchi  cardiac: +s1, s2, RRR, no murmurs appreciated  abd: soft, nt, nd, no rebound/guarding  mskt: no clubbing, cyanosis or edema of LE extremities  vasc: 2+ radial pulses  skin: warm and dry  neuro: a&ox3, no appreciable focal deficits   psych: normal mood, affect. normal behavior    ---  CONSULTANTS:   Cardiology - Dr Odom     ---  ADVANCED CARE PLANNING:  - Code status:    FULL CODE  - MOLST completed:      [ x ] NO     [  ] YES    ---  TIME SPENT:  I, the attending physician, was physically present for the key portions of the evaluation and management (E/M) service provided. The total amount of time spent reviewing the hospital notes, laboratory values, imaging findings, assessing/counseling the patient, discussing with consultant physicians, social work, nursing staff was 46 minutes ADMISSION DATE:  07-20-23    ---  FROM ADMISSION H+P:   HPI:  67y F pmhx addisons disease, hyperthyroidism now euthyroid s/p methimazole, colonic polyps, was BIBEMS to the ED for sudden onset palpitations associated with lightheadedness. Patient had colonoscopy this am that finished ~11am. She then went to diner and had coffee with a friend. Around 130pm she began to have right sided abdominal pain followed by palpitations and lightheadedness. She called EMS who gave her adenosine 6mg x 1 for suspected SVT while en route to the ED.     ED Course  Vitals: 98.4F, 152/104, 98% RA  Labs: cbc wnl, K 3.4 cmp otherwise wnl, trop negative  EKG: afib with rvr 157 bpm  Received: Cardizem 10mg IV x3, ativan 2mg IV, 1L IVF NS, started on cardizem gtt (20 Jul 2023 18:25)      ---  HOSPITAL COURSE/PERTINENT LABS/PROCEDURES PERFORMED/PENDING TESTS:  The patient presented with palpitations following a colonoscopy procedure, and was admitted for atrial fibrillation with rvr. Patient was treated with cardizem IVP and a cardizem gtt with conversion to sinus rhythm but subsequent development of soft blood pressures and discontinuation of cardizem gtt. The patient was started on rate control agent metoprolol tartrate by cardiology, to be continued upon discharge. The patient was in known atrial fibrillation for < 24 hours so cardiology did not recommend starting anticoagulation; however, patient's CHADsVASc score=2. Patient had a TTE performed inpatient. Patient was continued on her home fludrocortisone and hydrocortisone for noni's disease. The patient will follow up with cardiology outpatient following discharge for further management and workup as appropriate for her new diagnosis of paroxysmal atrial fibrillation.    The patient was seen and examined on the day of discharge. The patient is medically optimized for discharge to home with outpatient cardiology follow up.    ---  PATIENT CONDITION:  - stable    ---  PHYSICAL EXAM ON DAY OF DISCHARGE:  T(C): 36.9 (07-21-23 @ 09:56), Max: 36.9 (07-21-23 @ 09:56)  HR: 76 (07-21-23 @ 13:55) (67 - 88)  BP: 106/67 (07-21-23 @ 13:55) (84/56 - 121/58)  RR: 16 (07-21-23 @ 09:56) (16 - 18)  SpO2: 83% (07-21-23 @ 09:56) (83% - 98%)    gen: appears stated age, NAD  heent: perrl, mmm  resp: cta b/l, no wheezes, rales or rhonchi  cardiac: +s1, s2, RRR, no murmurs appreciated  abd: soft, nt, nd, no rebound/guarding  mskt: no clubbing, cyanosis or edema of LE extremities  vasc: 2+ radial pulses  skin: warm and dry  neuro: a&ox3, no appreciable focal deficits   psych: normal mood, affect. normal behavior    ---  CONSULTANTS:   Cardiology - Dr Odom     ---  ADVANCED CARE PLANNING:  - Code status:    FULL CODE  - MOLST completed:      [ x ] NO     [  ] YES    ---  TIME SPENT:  I, the attending physician, was physically present for the key portions of the evaluation and management (E/M) service provided. The total amount of time spent reviewing the hospital notes, laboratory values, imaging findings, assessing/counseling the patient, discussing with consultant physicians, social work, nursing staff was 46 minutes FROM ADMISSION H+P:   HPI:  67y F pmhx addisons disease, hyperthyroidism now euthyroid s/p methimazole, colonic polyps, was BIBEMS to the ED for sudden onset palpitations associated with lightheadedness. Patient had colonoscopy this am that finished ~11am. She then went to diner and had coffee with a friend. Around 130pm she began to have right sided abdominal pain followed by palpitations and lightheadedness.     ---  HOSPITAL COURSE/PERTINENT LABS/PROCEDURES PERFORMED/PENDING TESTS:  The patient presented with palpitations following a colonoscopy procedure, and was admitted for atrial fibrillation with rvr. Patient was treated with cardizem IVP and a cardizem gtt with conversion to sinus rhythm but subsequent development of soft blood pressures and discontinuation of cardizem gtt. The patient was started on rate control agent metoprolol tartrate by cardiology, to be continued upon discharge. The patient was in known atrial fibrillation for < 24 hours so cardiology did not recommend starting anticoagulation; however, patient's CHADsVASc score=2. Patient had a TTE performed inpatient. Patient was continued on her home fludrocortisone and hydrocortisone for noni's disease. The patient will follow up with cardiology outpatient following discharge for further management and workup as appropriate for her new diagnosis of paroxysmal atrial fibrillation which was very short lasting and converted to SR. No plans for long term AC at this time. May need event monitor.     The patient was seen and examined on the day of discharge. The patient is medically optimized for discharge to home with outpatient cardiology follow up.    ---  PATIENT CONDITION:  - stable    ---  PHYSICAL EXAM ON DAY OF DISCHARGE:  Vital Signs Last 24 Hrs  T(C): 36.7 (22 Jul 2023 04:33), Max: 36.9 (21 Jul 2023 09:56)  T(F): 98 (22 Jul 2023 04:33), Max: 98.5 (21 Jul 2023 09:56)  HR: 61 (22 Jul 2023 04:33) (61 - 88)  BP: 106/62 (22 Jul 2023 04:33) (101/65 - 106/67)  BP(mean): --  RR: 18 (22 Jul 2023 04:33) (16 - 18)  SpO2: 97% (22 Jul 2023 04:33) (83% - 97%)    Parameters below as of 22 Jul 2023 04:33  Patient On (Oxygen Delivery Method): room air        gen: appears stated age, NAD  heent: perrl, mmm  resp: cta b/l, no wheezes, rales or rhonchi  cardiac: +s1, s2, RRR, no murmurs appreciated  abd: soft, nt, nd, no rebound/guarding  mskt: no clubbing, cyanosis or edema of LE extremities  vasc: 2+ radial pulses  skin: warm and dry  neuro: a&ox3, no appreciable focal deficits   psych: normal mood, affect. normal behavior    ---  CONSULTANTS:   Cardiology - Dr Odom     ---  ADVANCED CARE PLANNING:  - Code status:    FULL CODE  - MOLST completed:      [ x ] NO     [  ] YES

## 2023-07-21 NOTE — DISCHARGE NOTE PROVIDER - CARE PROVIDER_API CALL
JENNIFER KUMAR  [address] [place] [address]  Phone: [phone]  Fax: [phone]  Follow Up Time:     Lela Ballard  Endocrinology/Metab/Diabetes  Follow Up Time:     Chin Goodwin  Cardiovascular Disease  [address], [place] [id number]  Phone: [phone]  Fax: [phone]  Follow Up Time:

## 2023-07-21 NOTE — PROGRESS NOTE ADULT - SUBJECTIVE AND OBJECTIVE BOX
Name: OMAR APARICIO  MRN: [id number]  LOCATION: [facility] [address]    ----  Patient is a 67y old  Female who presents with a chief complaint of new afib with rvr (21 Jul 2023 09:21)      FROM ADMISSION H+P:   HPI:  67y F pmhx addisons disease, hyperthyroidism now euthyroid s/p methimazole, colonic polyps, was BIBEMS to the ED for sudden onset palpitations associated with lightheadedness. Patient had colonoscopy this am that finished ~11am. She then went to diner and had coffee with a friend. Around 130pm she began to have right sided abdominal pain followed by palpitations and lightheadedness.     ----  INTERVAL HPI/OVERNIGHT EVENTS: Pt seen and evaluated at the bedside. No acute overnight events occurred. Pt reports feeling well. No events on tele overnight. Remains in NSR without further evidence of arrhythmia. No other complaints at present. Eager for dc planning. Pt agrees with plan for TTE and then proceed w/ dc planning pending findings.     ----  PAST MEDICAL & SURGICAL HISTORY:  Addisons disease      Hyperthyroidism      History of colonic polyps      S/P colonoscopic polypectomy          FAMILY HISTORY:  FH: colon cancer (Grandparent)        Allergies    IV Contrast (Hives)  NSAIDs (Hives)    Intolerances        ----  ANTIMICROBIALS:    CARDIOVASCULAR:  metoprolol tartrate 25 milliGRAM(s) Oral every 8 hours    GASTROINTESTINAL:  aluminum hydroxide/magnesium hydroxide/simethicone Suspension 30 milliLiter(s) Oral every 4 hours PRN  pantoprazole    Tablet 40 milliGRAM(s) Oral before breakfast    PULMONARY:      ----  REVIEW OF SYSTEMS:  CONSTITUTIONAL: denies fever, chills   HEENT: denies blurred vision, sore throat  CARDIOVASCULAR: denies chest pain, chest pressure   RESPIRATORY: denies shortness of breath, sputum production  GASTROINTESTINAL: denies nausea, vomiting   NEUROLOGICAL: denies numbness, headache, focal weakness  MUSCULOSKELETAL: denies new joint pain, muscle aches    ----  PHYSICAL EXAM:  GENERAL: patient appears well, no distress, nontoxic  ENMT: oropharynx clear without erythema, moist mucous membranes  LUNGS: good air entry bilaterally, clear to auscultation, symmetric breath sounds, no wheezing or rhonchi appreciated  HEART: soft S1/S2, regular rate and rhythm, no murmurs noted, no noted edema to b/l LE  GASTROINTESTINAL: abdomen is soft, nontender, nondistended, normoactive bowel sounds, no palpable masses  MUSCULOSKELETAL: no clubbing or cyanosis, no obvious deformity  NEUROLOGIC: awake, alert, readily interactive, good muscle tone in 4 extremities, readily ambulatory with steady gait    T(C): 36.9 (07-21-23 @ 09:56), Max: 36.9 (07-21-23 @ 09:56)  HR: 76 (07-21-23 @ 13:55) (67 - 106)  BP: 106/67 (07-21-23 @ 13:55) (84/56 - 121/58)  RR: 16 (07-21-23 @ 09:56) (16 - 18)  SpO2: 83% (07-21-23 @ 09:56) (83% - 98%)  Wt(kg): --    ----  INTAKE & OUTPUT:  I&O's Summary    21 Jul 2023 07:01  -  21 Jul 2023 16:47  --------------------------------------------------------  IN: 480 mL / OUT: 0 mL / NET: 480 mL        LABS:                        14.6   6.28  )-----------( 264      ( 21 Jul 2023 09:05 )             44.7     07-21    139  |  107  |  15  ----------------------------<  104<H>  4.1   |  26  |  0.89    Ca    8.8      21 Jul 2023 09:05  Phos  2.4     07-21  Mg     2.1     07-21    TPro  6.6  /  Alb  3.4  /  TBili  0.4  /  DBili  x   /  AST  19  /  ALT  29  /  AlkPhos  49  07-21      Urinalysis Basic - ( 21 Jul 2023 09:05 )    Color: x / Appearance: x / SG: x / pH: x  Gluc: 104 mg/dL / Ketone: x  / Bili: x / Urobili: x   Blood: x / Protein: x / Nitrite: x   Leuk Esterase: x / RBC: x / WBC x   Sq Epi: x / Non Sq Epi: x / Bacteria: x

## 2023-07-21 NOTE — DISCHARGE NOTE PROVIDER - NSDCCPCAREPLAN_GEN_ALL_CORE_FT
PRINCIPAL DISCHARGE DIAGNOSIS  Diagnosis: Atrial fibrillation  Assessment and Plan of Treatment: Your heart rhythm was irregular. You had an echocardiogram performed in the hospital.   Follow up with a cardiologist within 2 weeks of discharge.  START LOPRESSOR 25MG 1 TAB BY MOUTH EVERY 8 HOURS.      SECONDARY DISCHARGE DIAGNOSES  Diagnosis: H/O Harsha's disease  Assessment and Plan of Treatment: Continue your fludrocortisone and hydrocortisone as prescribed  Follow up with your endocrinologist following discharge.     PRINCIPAL DISCHARGE DIAGNOSIS  Diagnosis: Atrial fibrillation  Assessment and Plan of Treatment: Your heart rhythm was irregular. You had an echocardiogram performed in the hospital.   Follow up with a cardiologist within 2 weeks of discharge.  START LOPRESSOR 25MG 1 TAB BY MOUTH EVERY 8 HOURS.  Recommend folloiwing up with cardiology within in the next 1-2 weeks .      SECONDARY DISCHARGE DIAGNOSES  Diagnosis: H/O [place]'s disease  Assessment and Plan of Treatment: Continue your fludrocortisone and hydrocortisone as prescribed  Follow up with your endocrinologist following discharge.    Diagnosis: Blood in stool  Assessment and Plan of Treatment: Follow up with gastroenterology.

## 2023-07-21 NOTE — CARE COORDINATION ASSESSMENT. - NSCAREPROVIDERS_GEN_ALL_CORE_FT
CARE PROVIDERS:  Accepting Physician: Paddy Mcgovern  Admitting: Pdady Mcgovern  Attending: Paddy Mcgovern  Cardiology Technician: Aracely Whitlock  Case Management: Eli Adams  Consultant: Chin Goodwin  Consultant: Emma Tobias  Consultant: Ilana Tobias  Consultant: Weil, Patricia  Consultant: Bryan Kate  Covering Team: Shara Sharp  Covering Team: Debbie Dwyer  Covering Team: Cori Meyer  Covering Team: Andrei Harrington  ED Attending: Aristides Villegas  ED Nurse: Luis Manuel Mena  Nurse: Rowan Jeff  Nurse: Lenny Daily  Nurse: Victorina Bellamy  Ordered: ADM, User  Ordered: Doctor, Unknown  Override: Arlene Whitten  Override: Rowan Jeff  Override: Muriel Berumen  PCA/Nursing Assistant: Chon Rich  Primary Team: Luis Manuel Hunt

## 2023-07-21 NOTE — PROGRESS NOTE ADULT - ASSESSMENT
67y F pmhx addisons disease, hyperthyroidism now euthyroid s/p methimazole, colonic polyps, admitted for new onset afib with RVR.

## 2023-07-21 NOTE — DISCHARGE NOTE PROVIDER - PROVIDER TOKENS
PROVIDER:[TOKEN:[[id number]]],PROVIDER:[TOKEN:[[id number]]],PROVIDER:[TOKEN:[[id number]]]

## 2023-07-21 NOTE — PROGRESS NOTE ADULT - SUBJECTIVE AND OBJECTIVE BOX
[facility] -- Kimi Alvarez Pannella, Patel, Savella, Goodger  Office # [phone]    Follow Up:  New onset Afib, Palpitations     Subjective/Observations: seen and examined, awake, alert, sitting up in the chair, eating breakfast,  denies chest pain, dyspnea, palpitations or dizziness, orthopnea and PND. Tolerating room air. Noevnts noted overnight     REVIEW OF SYSTEMS: All other review of systems is negative unless indicated above  PAST MEDICAL & SURGICAL HISTORY:  Addisons disease      Hyperthyroidism      History of colonic polyps      S/P colonoscopic polypectomy        MEDICATIONS  (STANDING):  fludroCORTISONE 0.05 milliGRAM(s) Oral daily  hydrocortisone 20 milliGRAM(s) Oral daily  hydrocortisone 10 milliGRAM(s) Oral at bedtime  metoprolol tartrate 25 milliGRAM(s) Oral every 8 hours  pantoprazole    Tablet 40 milliGRAM(s) Oral before breakfast    MEDICATIONS  (PRN):  acetaminophen     Tablet .. 650 milliGRAM(s) Oral every 6 hours PRN Temp greater or equal to 38C (100.4F), Mild Pain (1 - 3)  aluminum hydroxide/magnesium hydroxide/simethicone Suspension 30 milliLiter(s) Oral every 4 hours PRN Dyspepsia  melatonin 3 milliGRAM(s) Oral at bedtime PRN Insomnia  ondansetron Injectable 4 milliGRAM(s) IV Push every 8 hours PRN Nausea and/or Vomiting    Allergies    IV Contrast (Hives)  NSAIDs (Hives)    Intolerances      Vital Signs Last 24 Hrs  T(C): 36.7 (21 Jul 2023 04:30), Max: 36.9 (20 Jul 2023 15:30)  T(F): 98.1 (21 Jul 2023 04:30), Max: 98.4 (20 Jul 2023 15:30)  HR: 67 (21 Jul 2023 04:30) (67 - 186)  BP: 96/63 (21 Jul 2023 04:30) (84/56 - 152/104)  BP(mean): --  RR: 18 (21 Jul 2023 04:30) (16 - 18)  SpO2: 92% (21 Jul 2023 04:30) (92% - 98%)    Parameters below as of 21 Jul 2023 04:30  Patient On (Oxygen Delivery Method): room air      I&O's Summary    21 Jul 2023 07:01  -  21 Jul 2023 09:21  --------------------------------------------------------  IN: 240 mL / OUT: 0 mL / NET: 240 mL      Weight (kg): 64.365 (07-20 @ 16:13)    TELE:  60-70's   PHYSICAL EXAM:  Constitutional: NAD, awake and alert  HEENT: Moist Mucous Membranes, Anicteric  Pulmonary: Non-labored, breath sounds are clear bilaterally, No wheezing, rales or rhonchi  Cardiovascular: Regular, S1 and S2, No murmurs, rubs, gallops or clicks  Gastrointestinal: Bowel Sounds present, soft, nontender.   Lymph: No peripheral edema. No lymphadenopathy.  Skin: No visible rashes or ulcers.  Psych:  Mood & affect appropriate  LABS: All Labs Reviewed:                        15.6   7.63  )-----------( 296      ( 20 Jul 2023 15:50 )             44.2     20 Jul 2023 15:50    136    |  105    |  18     ----------------------------<  219    3.4     |  23     |  1.20     Ca    9.0        20 Jul 2023 15:50  Mg     2.2       20 Jul 2023 15:50    TPro  7.5    /  Alb  3.8    /  TBili  0.5    /  DBili  x      /  AST  27     /  ALT  35     /  AlkPhos  54     20 Jul 2023 15:50

## 2023-07-21 NOTE — PROGRESS NOTE ADULT - ASSESSMENT
68 y/o F with no significant medical/surgical history except [place]'s disease and GERD, s/p colonoscopy today (7/20) presented to the ED c/o palpitation.  Patient states, after colonoscopy, her and her friend went to a diner to eat.  She had 2 cups of coffee then.  c/o palpitations, dizziness, and near syncope, found by EMS in "SVT"    Cardiac Arrhythmia/new Onset Afib  - Rhythm strips from EMS showed Afib with RVR, rates 150's, given Adenosine but did not break  - s/p Cardizem 10 mg IV x 1 in ED, rate slightly improved to 100's-130's, with eventual conversion to SR no further episode of Afib overnight, she remains in SR 60-70 with no arrythmia recorded/ noted.  - continue lopressor 25 mg PO BID hold for SBP <90 mmhg, HR <60 bpm     - No known history of arrhythmia or ischemic disease  - Had seen a cardiologist for palpitations in the past; was placed on monitor but was unrevealing  - Had reportedly normal stress test and TTE few years ago.  Would obtain TTE here to assess cardiac structures  - Obtain TSH    - EKG is non-ischemic  - No anginal complaints    - No evidence of volume overload  - Non-orthopneic with no O2 requirement    - Will continue to follow.    Ilana Tobias [facility]  Nurse Practitioner - Cardiology   call TEAMS  68 y/o F with no significant medical/surgical history except [place]'s disease and GERD, s/p colonoscopy today (7/20) presented to the ED c/o palpitation.  Patient states, after colonoscopy, her and her friend went to a diner to eat.  She had 2 cups of coffee then.  c/o palpitations, dizziness, and near syncope, found by EMS in "SVT"    Cardiac Arrhythmia/new Onset Afib  - Rhythm strips from EMS showed Afib with RVR, rates 150's, given Adenosine but did not break  - s/p Cardizem 10 mg IV x 1 in ED, rate slightly improved to 100's-130's, with eventual conversion to SR no further episode of Afib overnight, she remains in SR 60-70 with no arrythmia recorded/ noted.  - continue lopressor 25 mg PO TID  hold for SBP <90 mmhg, HR <60 bpm     - No known history of arrhythmia or ischemic disease  - Had seen a cardiologist for palpitations in the past; was placed on monitor but was unrevealing  - Had reportedly normal stress test and TTE few years ago.  Would obtain TTE here to assess cardiac structures  - Obtain TSH    - EKG is non-ischemic  - No anginal complaints    - No evidence of volume overload  - Non-orthopneic with no O2 requirement    - Will continue to follow.    ANY PaizWIN  Nurse Practitioner - Cardiology   call TEAMS

## 2023-07-21 NOTE — DISCHARGE NOTE PROVIDER - NSDCMRMEDTOKEN_GEN_ALL_CORE_FT
fludrocortisone 0.1 mg oral tablet: 0.5 tab(s) orally once a day (in the morning)  hydrocortisone 10 mg oral tablet: 1 orally once a day (at bedtime)  hydrocortisone 20 mg oral tablet: 1 orally once a day (in the morning)  omeprazole 20 mg oral delayed release tablet: 1 orally once a day   fludrocortisone 0.1 mg oral tablet: 0.5 tab(s) orally once a day (in the morning)  hydrocortisone 10 mg oral tablet: 1 orally once a day (at bedtime)  hydrocortisone 20 mg oral tablet: 1 orally once a day (in the morning)  metoprolol tartrate 25 mg oral tablet: 1 tab(s) orally every 8 hours  omeprazole 20 mg oral delayed release tablet: 1 orally once a day

## 2023-07-21 NOTE — PROGRESS NOTE ADULT - TIME BILLING
spoke w/ cardiology  management as above - documneted by attending but I worked with residents to coordinate care for the patient today

## 2023-07-22 ENCOUNTER — TRANSCRIPTION ENCOUNTER (OUTPATIENT)
Age: 68
End: 2023-07-22

## 2023-07-22 VITALS — HEART RATE: 68 BPM | DIASTOLIC BLOOD PRESSURE: 65 MMHG | SYSTOLIC BLOOD PRESSURE: 103 MMHG

## 2023-07-22 LAB
ANION GAP SERPL CALC-SCNC: 4 MMOL/L — LOW (ref 5–17)
BUN SERPL-MCNC: 18 MG/DL — SIGNIFICANT CHANGE UP (ref 7–23)
CALCIUM SERPL-MCNC: 9.8 MG/DL — SIGNIFICANT CHANGE UP (ref 8.5–10.1)
CHLORIDE SERPL-SCNC: 106 MMOL/L — SIGNIFICANT CHANGE UP (ref 96–108)
CO2 SERPL-SCNC: 28 MMOL/L — SIGNIFICANT CHANGE UP (ref 22–31)
CREAT SERPL-MCNC: 0.85 MG/DL — SIGNIFICANT CHANGE UP (ref 0.5–1.3)
EGFR: 76 ML/MIN/1.73M2 — SIGNIFICANT CHANGE UP
GLUCOSE SERPL-MCNC: 93 MG/DL — SIGNIFICANT CHANGE UP (ref 70–99)
HCT VFR BLD CALC: 45.4 % — HIGH (ref 34.5–45)
HGB BLD-MCNC: 15 G/DL — SIGNIFICANT CHANGE UP (ref 11.5–15.5)
MAGNESIUM SERPL-MCNC: 2.1 MG/DL — SIGNIFICANT CHANGE UP (ref 1.6–2.6)
MCHC RBC-ENTMCNC: 31.4 PG — SIGNIFICANT CHANGE UP (ref 27–34)
MCHC RBC-ENTMCNC: 33 GM/DL — SIGNIFICANT CHANGE UP (ref 32–36)
MCV RBC AUTO: 95 FL — SIGNIFICANT CHANGE UP (ref 80–100)
NRBC # BLD: 0 /100 WBCS — SIGNIFICANT CHANGE UP (ref 0–0)
PHOSPHATE SERPL-MCNC: 3.6 MG/DL — SIGNIFICANT CHANGE UP (ref 2.5–4.5)
PLATELET # BLD AUTO: 285 K/UL — SIGNIFICANT CHANGE UP (ref 150–400)
POTASSIUM SERPL-MCNC: 4.1 MMOL/L — SIGNIFICANT CHANGE UP (ref 3.5–5.3)
POTASSIUM SERPL-SCNC: 4.1 MMOL/L — SIGNIFICANT CHANGE UP (ref 3.5–5.3)
RBC # BLD: 4.78 M/UL — SIGNIFICANT CHANGE UP (ref 3.8–5.2)
RBC # FLD: 12.2 % — SIGNIFICANT CHANGE UP (ref 10.3–14.5)
SODIUM SERPL-SCNC: 138 MMOL/L — SIGNIFICANT CHANGE UP (ref 135–145)
WBC # BLD: 6.38 K/UL — SIGNIFICANT CHANGE UP (ref 3.8–10.5)
WBC # FLD AUTO: 6.38 K/UL — SIGNIFICANT CHANGE UP (ref 3.8–10.5)

## 2023-07-22 PROCEDURE — 86803 HEPATITIS C AB TEST: CPT

## 2023-07-22 PROCEDURE — 84484 ASSAY OF TROPONIN QUANT: CPT

## 2023-07-22 PROCEDURE — 96375 TX/PRO/DX INJ NEW DRUG ADDON: CPT

## 2023-07-22 PROCEDURE — 84436 ASSAY OF TOTAL THYROXINE: CPT

## 2023-07-22 PROCEDURE — 80048 BASIC METABOLIC PNL TOTAL CA: CPT

## 2023-07-22 PROCEDURE — 99239 HOSP IP/OBS DSCHRG MGMT >30: CPT

## 2023-07-22 PROCEDURE — 84100 ASSAY OF PHOSPHORUS: CPT

## 2023-07-22 PROCEDURE — 85027 COMPLETE CBC AUTOMATED: CPT

## 2023-07-22 PROCEDURE — 36415 COLL VENOUS BLD VENIPUNCTURE: CPT

## 2023-07-22 PROCEDURE — 80053 COMPREHEN METABOLIC PANEL: CPT

## 2023-07-22 PROCEDURE — 99232 SBSQ HOSP IP/OBS MODERATE 35: CPT

## 2023-07-22 PROCEDURE — 93005 ELECTROCARDIOGRAM TRACING: CPT

## 2023-07-22 PROCEDURE — 93306 TTE W/DOPPLER COMPLETE: CPT

## 2023-07-22 PROCEDURE — 85025 COMPLETE CBC W/AUTO DIFF WBC: CPT

## 2023-07-22 PROCEDURE — 99285 EMERGENCY DEPT VISIT HI MDM: CPT | Mod: 25

## 2023-07-22 PROCEDURE — 84480 ASSAY TRIIODOTHYRONINE (T3): CPT

## 2023-07-22 PROCEDURE — 93306 TTE W/DOPPLER COMPLETE: CPT | Mod: 26

## 2023-07-22 PROCEDURE — 84443 ASSAY THYROID STIM HORMONE: CPT

## 2023-07-22 PROCEDURE — 83036 HEMOGLOBIN GLYCOSYLATED A1C: CPT

## 2023-07-22 PROCEDURE — 96376 TX/PRO/DX INJ SAME DRUG ADON: CPT

## 2023-07-22 PROCEDURE — 96374 THER/PROPH/DIAG INJ IV PUSH: CPT

## 2023-07-22 PROCEDURE — 83735 ASSAY OF MAGNESIUM: CPT

## 2023-07-22 PROCEDURE — 71045 X-RAY EXAM CHEST 1 VIEW: CPT

## 2023-07-22 RX ORDER — SODIUM CHLORIDE 9 MG/ML
1000 INJECTION INTRAMUSCULAR; INTRAVENOUS; SUBCUTANEOUS ONCE
Refills: 0 | Status: COMPLETED | OUTPATIENT
Start: 2023-07-22 | End: 2023-07-22

## 2023-07-22 RX ADMIN — Medication 25 MILLIGRAM(S): at 13:29

## 2023-07-22 RX ADMIN — FLUDROCORTISONE ACETATE 0.05 MILLIGRAM(S): 0.1 TABLET ORAL at 08:44

## 2023-07-22 RX ADMIN — Medication 25 MILLIGRAM(S): at 08:46

## 2023-07-22 RX ADMIN — Medication 10 MILLIGRAM(S): at 13:29

## 2023-07-22 RX ADMIN — PANTOPRAZOLE SODIUM 40 MILLIGRAM(S): 20 TABLET, DELAYED RELEASE ORAL at 08:44

## 2023-07-22 RX ADMIN — SODIUM CHLORIDE 500 MILLILITER(S): 9 INJECTION INTRAMUSCULAR; INTRAVENOUS; SUBCUTANEOUS at 09:49

## 2023-07-22 RX ADMIN — Medication 20 MILLIGRAM(S): at 08:44

## 2023-07-22 NOTE — PROGRESS NOTE ADULT - ASSESSMENT
68 y/o F with no significant medical/surgical history except [place]'s disease and GERD, s/p colonoscopy today (7/20) presented to the ED c/o palpitation.  Patient states, after colonoscopy, her and her friend went to a diner to eat.  She had 2 cups of coffee then.  c/o palpitations, dizziness, and near syncope, found by EMS in "SVT"    Cardiac Arrhythmia/new Onset Afib  - Rhythm strips from EMS showed Afib with RVR, rates 150's, given Adenosine but did not break  - s/p Cardizem 10 mg IV x 1 in ED, rate slightly improved to 100's-130's, with eventual conversion to SR no further episode of Afib overnight, she remains in SR 60-70 with no arrythmia recorded/ noted.  - continue lopressor 25 mg PO TID   -hold off on AC given BRB per notes,  hgb stable fu primary and GI recs     - No known history of arrhythmia or ischemic disease  - Had seen a cardiologist for palpitations in the past; was placed on monitor but was unrevealing  - Had reportedly normal stress test and TTE few years ago.   -Echo pending   - TSH normal     - EKG is non-ischemic  - No anginal complaints  -trop negative    - No evidence of volume overload  - Non-orthopneic with no O2 requirement    - Will continue to follow.    Guadalupe Nair FNP-C  Cardiology NP    call TEAMS  68 y/o F with no significant medical/surgical history except [place]'s disease and GERD, s/p colonoscopy today (7/20) presented to the ED c/o palpitation.  Patient states, after colonoscopy, her and her friend went to a diner to eat.  She had 2 cups of coffee then.  c/o palpitations, dizziness, and near syncope, found by EMS in "SVT"    Cardiac Arrhythmia/new Onset Afib  - Rhythm strips from EMS showed Afib with RVR, rates 150's, given Adenosine but did not break  - s/p Cardizem 10 mg IV x 1 in ED, rate slightly improved to 100's-130's, with eventual conversion to SR no further episode of Afib overnight, she remains in SR 60-70 with no arrythmia recorded/ noted.  - continue lopressor 25 mg PO TID   -hold off on AC given BRB sp polyp removal during colonoscopy 7/20   hgb stable, [id number] when cleared    - No known history of arrhythmia or ischemic disease  - Had seen a cardiologist for palpitations in the past; was placed on monitor but was unrevealing  - Had reportedly normal stress test and TTE few years ago.   -Echo asa above hyperdynamic lv mild to mod tr   - TSH normal     - EKG is non-ischemic  - No anginal complaints  -trop negative    - No evidence of volume overload, appears dry receiving IVF  - Non-orthopneic with no O2 requirement        Guadalupe Nair FNP-C  Cardiology NP    call TEAMS

## 2023-07-22 NOTE — DISCHARGE NOTE NURSING/CASE MANAGEMENT/SOCIAL WORK - PATIENT PORTAL LINK FT
You can access the FollowMyHealth Patient Portal offered by [facility] by registering at the following website: [email or web address]. By joining [employer]’s FollowMyHealth portal, you will also be able to view your health information using other applications (apps) compatible with our system.

## 2023-07-22 NOTE — CASE MANAGEMENT PROGRESS NOTE - NSCMPROGRESSNOTE_GEN_ALL_CORE
Patient is discharge home. CM met with patient to discussed discharge disposition home with no formal services. Patient verbalized understanding of discharge plan and patient is in agreement.  Son to transport in a private car around 2:30pm. CM remains available throughout hospital stay.   Patient is discharge home. CM met with patient to discussed discharge disposition home with no formal services. Patient verbalized understanding of discharge plan and patient is in agreement.  Discharge notice signed and copy given to patient. Son to transport in a private car around 2:30pm. CM remains available throughout hospital stay.

## 2023-07-22 NOTE — PROGRESS NOTE ADULT - SUBJECTIVE AND OBJECTIVE BOX
[facility] -- Kimi Alvarez Pannella, Patel, Savella [facility]  Office # [phone]      Follow Up:  af    Subjective/Observations:   No events overnight resting comfortably in bed.  No complaints of chest pain, dyspnea, or palpitations reported. No signs of orthopnea or PND.      REVIEW OF SYSTEMS: All other review of systems is negative unless indicated above    PAST MEDICAL & SURGICAL HISTORY:  Addisons disease      Hyperthyroidism      History of colonic polyps      S/P colonoscopic polypectomy          MEDICATIONS  (STANDING):  fludroCORTISONE 0.05 milliGRAM(s) Oral daily  hydrocortisone 20 milliGRAM(s) Oral daily  hydrocortisone 10 milliGRAM(s) Oral daily  metoprolol tartrate 25 milliGRAM(s) Oral every 8 hours  pantoprazole    Tablet 40 milliGRAM(s) Oral before breakfast    MEDICATIONS  (PRN):  acetaminophen     Tablet .. 650 milliGRAM(s) Oral every 6 hours PRN Mild Pain (1 - 3)  acetaminophen     Tablet .. 650 milliGRAM(s) Oral every 6 hours PRN Temp greater or equal to 38C (100.4F)  aluminum hydroxide/magnesium hydroxide/simethicone Suspension 30 milliLiter(s) Oral every 4 hours PRN Dyspepsia  melatonin 3 milliGRAM(s) Oral at bedtime PRN Insomnia  ondansetron Injectable 4 milliGRAM(s) IV Push every 8 hours PRN Nausea and/or Vomiting      Allergies    IV Contrast (Hives)  NSAIDs (Hives)    Intolerances        Vital Signs Last 24 Hrs  T(C): 36.7 (2023 04:33), Max: 36.9 (2023 09:56)  T(F): 98 (2023 04:33), Max: 98.5 (2023 09:56)  HR: 61 (2023 04:33) (61 - 88)  BP: 106/62 (2023 04:33) (101/65 - 106/67)  BP(mean): --  RR: 18 (2023 04:33) (16 - 18)  SpO2: 97% (2023 04:33) (83% - 97%)    Parameters below as of 2023 04:33  Patient On (Oxygen Delivery Method): room air        I&O's Summary    2023 07:01  -  2023 07:00  --------------------------------------------------------  IN: 480 mL / OUT: 0 mL / NET: 480 mL          PHYSICAL EXAM:  TELE:   Constitutional: NAD, awake and alert, well-developed  HEENT: Moist Mucous Membranes, Anicteric  Pulmonary: Non-labored, breath sounds are clear bilaterally, No wheezing, crackles or rhonchi  Cardiovascular: Regular, S1 and S2 nl, No murmurs, rubs, gallops or clicks  Gastrointestinal: Bowel Sounds present, soft, nontender.   Lymph: No lymphadenopathy. No peripheral edema.  Skin: No visible rashes or ulcers.  Psych:  Mood & affect appropriate    LABS: All Labs Reviewed:                        14.6   6.28  )-----------( 264      ( 2023 09:05 )             44.7                         15.6   7.63  )-----------( 296      ( 2023 15:50 )             44.2     2023 09:05    139    |  107    |  15     ----------------------------<  104    4.1     |  26     |  0.89   2023 15:50    136    |  105    |  18     ----------------------------<  219    3.4     |  23     |  1.20     Ca    8.8        2023 09:05  Ca    9.0        2023 15:50  Phos  2.4       2023 09:05  Mg     2.1       2023 09:05  Mg     2.2       2023 15:50    TPro  6.6    /  Alb  3.4    /  TBili  0.4    /  DBili  x      /  AST  19     /  ALT  29     /  AlkPhos  49     2023 09:05  TPro  7.5    /  Alb  3.8    /  TBili  0.5    /  DBili  x      /  AST  27     /  ALT  35     /  AlkPhos  54     2023 15:50             EC Lead ECG:   Ventricular Rate 157 BPM    QRS Duration 66 ms    Q-T Interval 258 ms    QTC Calculation(Bazett) 417 ms    R Axis 69 degrees    T Axis -37 degrees    Diagnosis Line Atrial fibrillation with rapid ventricular response with premature ventricular or aberrantly conducted complexes  ST & T wave abnormality, consider inferior ischemia  Abnormal ECG  No previous ECGs available  Confirmed by ESTEFANY COOPER ([id number]) on 2023 7:15:00 PM (23 @ 15:34)        Radiology:         [facility] -- Kimi Alvarez Pannella, Patel, Savella [facility]  Office # [phone]      Follow Up:  af    Subjective/Observations:   No events overnight resting comfortably in chair on room air     No complaints of chest pain, dyspnea, or palpitations reported. No signs of orthopnea or PND.      REVIEW OF SYSTEMS: All other review of systems is negative unless indicated above    PAST MEDICAL & SURGICAL HISTORY:  Addisons disease      Hyperthyroidism      History of colonic polyps      S/P colonoscopic polypectomy          MEDICATIONS  (STANDING):  fludroCORTISONE 0.05 milliGRAM(s) Oral daily  hydrocortisone 20 milliGRAM(s) Oral daily  hydrocortisone 10 milliGRAM(s) Oral daily  metoprolol tartrate 25 milliGRAM(s) Oral every 8 hours  pantoprazole    Tablet 40 milliGRAM(s) Oral before breakfast    MEDICATIONS  (PRN):  acetaminophen     Tablet .. 650 milliGRAM(s) Oral every 6 hours PRN Mild Pain (1 - 3)  acetaminophen     Tablet .. 650 milliGRAM(s) Oral every 6 hours PRN Temp greater or equal to 38C (100.4F)  aluminum hydroxide/magnesium hydroxide/simethicone Suspension 30 milliLiter(s) Oral every 4 hours PRN Dyspepsia  melatonin 3 milliGRAM(s) Oral at bedtime PRN Insomnia  ondansetron Injectable 4 milliGRAM(s) IV Push every 8 hours PRN Nausea and/or Vomiting      Allergies    IV Contrast (Hives)  NSAIDs (Hives)    Intolerances        Vital Signs Last 24 Hrs  T(C): 36.7 (2023 04:33), Max: 36.9 (2023 09:56)  T(F): 98 (2023 04:33), Max: 98.5 (2023 09:56)  HR: 61 (2023 04:33) (61 - 88)  BP: 106/62 (2023 04:33) (101/65 - 106/67)  BP(mean): --  RR: 18 (2023 04:33) (16 - 18)  SpO2: 97% (2023 04:33) (83% - 97%)    Parameters below as of 2023 04:33  Patient On (Oxygen Delivery Method): room air        I&O's Summary    2023 07:01  -  2023 07:00  --------------------------------------------------------  IN: 480 mL / OUT: 0 mL / NET: 480 mL          PHYSICAL EXAM:  TELE: SR  Constitutional: NAD, awake and alert, well-developed  HEENT: Moist Mucous Membranes, Anicteric  Pulmonary: Non-labored, breath sounds are clear bilaterally, No wheezing, crackles or rhonchi  Cardiovascular: Regular, S1 and S2 nl, No murmurs, rubs, gallops or clicks  Gastrointestinal: Bowel Sounds present, soft, nontender.   Lymph: No lymphadenopathy. No peripheral edema.  Skin: No visible rashes or ulcers.  Psych:  Mood & affect appropriate    LABS: All Labs Reviewed:                        14.6   6.28  )-----------( 264      ( 2023 09:05 )             44.7                         15.6   7.63  )-----------( 296      ( 2023 15:50 )             44.2     2023 09:05    139    |  107    |  15     ----------------------------<  104    4.1     |  26     |  0.89   2023 15:50    136    |  105    |  18     ----------------------------<  219    3.4     |  23     |  1.20     Ca    8.8        2023 09:05  Ca    9.0        2023 15:50  Phos  2.4       2023 09:05  Mg     2.1       2023 09:05  Mg     2.2       2023 15:50    TPro  6.6    /  Alb  3.4    /  TBili  0.4    /  DBili  x      /  AST  19     /  ALT  29     /  AlkPhos  49     2023 09:05  TPro  7.5    /  Alb  3.8    /  TBili  0.5    /  DBili  x      /  AST  27     /  ALT  35     /  AlkPhos  54     2023 15:50             EC Lead ECG:   Ventricular Rate 157 BPM    QRS Duration 66 ms    Q-T Interval 258 ms    QTC Calculation(Bazett) 417 ms    R Axis 69 degrees    T Axis -37 degrees    Diagnosis Line Atrial fibrillation with rapid ventricular response with premature ventricular or aberrantly conducted complexes  ST & T wave abnormality, consider inferior ischemia  Abnormal ECG  No previous ECGs available  Confirmed by ESTEFANY COOPER ([id number]) on 2023 7:15:00 PM (23 @ 15:34)        Radiology:  < from: TTE W or WO Ultrasound Enhancing Agent (23 @ 17:13) >      1. Technically difficult image quality.   2. 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   3. Prolapse of the anterior mitral leaflet.   4. Trace mitral regurgitation.   5. Trileaflet aortic valve with normal systolic excursion.   6. The left atrium is enlarged in size, but within normal limits for body surface area.   7. The left ventricular systolic function is hyperdynamic with an ejection fraction of 76 % by Camacho's method of disks.   8. The right atrium is normal in size.   9. Normal right ventricular cavity size and probably normal right ventricular systolic function.  10. Mild-moderate tricuspid regurgitation.  11. Estimated pulmonary artery systolic pressure is 25 mmHg, consistent with normal pulmonary artery pressure.  12. The inferior vena cava is normal in size measuring 1.50 cm in diameter, (normal <2.1cm) with normal inspiratory collapse (normal >50%) consistent with normal right atrial pressure (~3, range 0-5mmHg).    < end of copied text >

## 2023-07-22 NOTE — PROGRESS NOTE ADULT - NS ATTEND AMEND GEN_ALL_CORE FT
now in sr. cont bb. chdsvasc is 2 but PAF may be 2/2 colonoscopy prep and has BRBPR. will need GI w/u and then will consider ac. No signs of significant ischemia or volume overload. cont current care. Further cardiac workup will depend on clinical course.
Hyperdynamic LV noted. .liely dry. give IVF. no further af. No signs of significant ischemia or volume overload. cont current care. Further cardiac workup will depend on clinical course. DC planning per primary team.

## 2023-07-22 NOTE — DISCHARGE NOTE NURSING/CASE MANAGEMENT/SOCIAL WORK - NSDCPEFALRISK_GEN_ALL_CORE
For information on Fall & Injury Prevention, visit: [email or web address] OR  [email or web address] OR  https://www.cdc.gov/steadi/patient.html

## 2023-08-10 ENCOUNTER — APPOINTMENT (OUTPATIENT)
Dept: CARDIOLOGY | Facility: CLINIC | Age: 68
End: 2023-08-10
Payer: MEDICARE

## 2023-08-10 VITALS
HEIGHT: 65 IN | OXYGEN SATURATION: 97 % | BODY MASS INDEX: 24.99 KG/M2 | DIASTOLIC BLOOD PRESSURE: 81 MMHG | SYSTOLIC BLOOD PRESSURE: 137 MMHG | HEART RATE: 61 BPM | WEIGHT: 150 LBS

## 2023-08-10 DIAGNOSIS — I48.0 PAROXYSMAL ATRIAL FIBRILLATION: ICD-10-CM

## 2023-08-10 PROCEDURE — 93000 ELECTROCARDIOGRAM COMPLETE: CPT

## 2023-08-10 PROCEDURE — 99214 OFFICE O/P EST MOD 30 MIN: CPT

## 2023-08-10 RX ORDER — SERTRALINE 25 MG/1
25 TABLET, FILM COATED ORAL
Qty: 90 | Refills: 0 | Status: COMPLETED | COMMUNITY
Start: 2022-03-02 | End: 2023-08-10

## 2023-08-10 RX ORDER — HYDROCORTISONE 10 MG/1
10 TABLET ORAL
Refills: 0 | Status: ACTIVE | COMMUNITY
Start: 2022-06-14

## 2023-08-10 NOTE — PHYSICAL EXAM
[Normal S1, S2] : normal S1, S2 [No Murmur] : no murmur [Normal] : alert and oriented, normal memory

## 2023-08-10 NOTE — DISCUSSION/SUMMARY
[FreeTextEntry1] : Olesya is a 67-year-old female with a past medical history of Harsha's disease, hypothyroidism which is now euthyroid status post methimazole, colonic polyps, and was BIBEMS to the emergency department for sudden onset palpitations associated with lightheadedness.   This occurred after she had a colonoscopy on 7/20/2023 and then she went to the diner and had coffee with a friend.  Around 1:30pm that same day she began to have right-sided abdominal pain followed by palpitations and lightheadedness.  When she presented with palpitations following a colonoscopy procedure, she had been admitted for atrial fibrillation with a rapid ventricular response.  She was treated with Cardizem IV push and a Cardizem drip with conversion to a sinus rhythm but developed soft blood pressures and the Cardizem drip was discontinued.  She was started on a rate control agent of metoprolol tartrate to be continued upon discharge. She was sent home on metoprolol tartrate 25mg TID.  She has only had one incident of palpitations that was momentary at home at night. Instead of taking metoprolol 25 mg 3 times daily, I have changed her to metoprolol succinate 25 mg every morning and 50 mg every evening.  I have asked her also to wear a Zio patch for 2 weeks to evaluate for any arrhythmias. I have also asked her to check her blood pressure and her heart rate once a day and to increase her fluid intake and decrease any caffeine intake that she may be taking now.  She will return to the office for a cardiac evaluation to evaluate her blood pressure as well as her heart rate and rhythm in 1 month.  I will call her with the results of the Zio patch when I receive them.  Further recommendations will be based on her clinical course. [EKG obtained to assist in diagnosis and management of assessed problem(s)] : EKG obtained to assist in diagnosis and management of assessed problem(s)

## 2023-08-10 NOTE — HISTORY OF PRESENT ILLNESS
[FreeTextEntry1] : Olesya is a 67-year-old female with a past medical history of Harsha's disease, hypothyroidism which is now euthyroid status post methimazole, colonic polyps, and was BIBEMS to the emergency department for sudden onset palpitations associated with lightheadedness.   The patient had a colonoscopy on 7/20/2023 and then she went to the diner and had coffee with a friend.  Around 1:30pm that same day she began to have right-sided abdominal pain followed by palpitations and lightheadedness.  When she presented with palpitations following a colonoscopy procedure, she had been admitted for atrial fibrillation with a rapid ventricular response.  She was treated with Cardizem IV push and a Cardizem drip with conversion to a sinus rhythm but developed soft blood pressures and the Cardizem drip was discontinued.  She was started on a rate control agent of metoprolol tartrate to be continued upon discharge.   She was known to be in atrial fibrillation for less than 24 hours so it was not recommended that she start on anticoagulation, however the patient's Rickey vas score is equal to 2.  She had an echocardiogram performed as an inpatient.  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  There was a prolapse of the anterior mitral leaflet.  There was trileaflet aortic valve with normal systolic excursion.  The left atrium was enlarged in size but within normal limits for her body surface area.  There was an ejection fraction of 76%.  There was mild to moderate tricuspid regurgitation.  There were normal pulmonary artery pressures. Today her EKG reveals a regular sinus rhythm at a rate of 62bpm. No ectopics. She states she did feel some brief palpitations the other evening while trying to go to sleep.  She is presently on metoprolol tartrate 25mg TID.as well as hydrocortisone 10mg QPM and 20mg QAM along with Fludrocortisone Acetate 0.1mg QD. Her blood pressure is 137/81.

## 2023-08-15 RX ORDER — METOPROLOL SUCCINATE 50 MG/1
50 TABLET, EXTENDED RELEASE ORAL AT BEDTIME
Qty: 90 | Refills: 1 | Status: COMPLETED | COMMUNITY
Start: 2023-08-10 | End: 2023-08-15

## 2023-08-15 RX ORDER — METOPROLOL SUCCINATE 25 MG/1
25 TABLET, EXTENDED RELEASE ORAL EVERY MORNING
Qty: 90 | Refills: 1 | Status: COMPLETED | COMMUNITY
Start: 2023-08-10 | End: 2023-08-15

## 2023-08-23 ENCOUNTER — RX RENEWAL (OUTPATIENT)
Age: 68
End: 2023-08-23

## 2023-09-07 ENCOUNTER — APPOINTMENT (OUTPATIENT)
Dept: CARDIOLOGY | Facility: CLINIC | Age: 68
End: 2023-09-07

## 2023-09-22 ENCOUNTER — RX ONLY (RX ONLY)
Age: 68
End: 2023-09-22

## 2023-09-22 ENCOUNTER — OFFICE (OUTPATIENT)
Dept: URBAN - METROPOLITAN AREA CLINIC 109 | Facility: CLINIC | Age: 68
Setting detail: OPHTHALMOLOGY
End: 2023-09-22
Payer: MEDICARE

## 2023-09-22 DIAGNOSIS — H02.89: ICD-10-CM

## 2023-09-22 DIAGNOSIS — H00.14: ICD-10-CM

## 2023-09-22 PROCEDURE — 99213 OFFICE O/P EST LOW 20 MIN: CPT | Performed by: OPHTHALMOLOGY

## 2023-09-22 ASSESSMENT — TONOMETRY
OS_IOP_MMHG: 16
OD_IOP_MMHG: 16

## 2023-09-22 ASSESSMENT — CONFRONTATIONAL VISUAL FIELD TEST (CVF)
OD_FINDINGS: FULL
OS_FINDINGS: FULL

## 2023-09-22 ASSESSMENT — VISUAL ACUITY
OD_BCVA: 20/20
OS_BCVA: 20/20-2

## 2023-10-10 ENCOUNTER — RX ONLY (RX ONLY)
Age: 68
End: 2023-10-10

## 2023-10-10 ENCOUNTER — OFFICE (OUTPATIENT)
Dept: URBAN - METROPOLITAN AREA CLINIC 109 | Facility: CLINIC | Age: 68
Setting detail: OPHTHALMOLOGY
End: 2023-10-10
Payer: MEDICARE

## 2023-10-10 DIAGNOSIS — H00.14: ICD-10-CM

## 2023-10-10 PROCEDURE — 99213 OFFICE O/P EST LOW 20 MIN: CPT | Performed by: OPHTHALMOLOGY

## 2023-10-10 ASSESSMENT — CONFRONTATIONAL VISUAL FIELD TEST (CVF)
OD_FINDINGS: FULL
OS_FINDINGS: FULL

## 2023-10-10 ASSESSMENT — VISUAL ACUITY
OD_BCVA: 20/20-3
OS_BCVA: 20/20-3

## 2023-10-10 ASSESSMENT — TONOMETRY
OS_IOP_MMHG: 17
OD_IOP_MMHG: 17

## 2023-10-21 ENCOUNTER — EMERGENCY (EMERGENCY)
Facility: HOSPITAL | Age: 68
LOS: 1 days | Discharge: HOME | End: 2023-10-21
Attending: EMERGENCY MEDICINE | Admitting: EMERGENCY MEDICINE
Payer: MEDICARE

## 2023-10-21 VITALS
SYSTOLIC BLOOD PRESSURE: 128 MMHG | DIASTOLIC BLOOD PRESSURE: 62 MMHG | RESPIRATION RATE: 16 BRPM | TEMPERATURE: 98 F | HEART RATE: 89 BPM | OXYGEN SATURATION: 97 %

## 2023-10-21 VITALS
SYSTOLIC BLOOD PRESSURE: 156 MMHG | HEART RATE: 116 BPM | HEIGHT: 65 IN | WEIGHT: 139.99 LBS | RESPIRATION RATE: 18 BRPM | DIASTOLIC BLOOD PRESSURE: 79 MMHG | TEMPERATURE: 97 F | OXYGEN SATURATION: 99 %

## 2023-10-21 DIAGNOSIS — Z98.89 OTHER SPECIFIED POSTPROCEDURAL STATES: Chronic | ICD-10-CM

## 2023-10-21 DIAGNOSIS — Z98.890 OTHER SPECIFIED POSTPROCEDURAL STATES: Chronic | ICD-10-CM

## 2023-10-21 LAB
ALBUMIN SERPL ELPH-MCNC: 3.7 G/DL — SIGNIFICANT CHANGE UP (ref 3.3–5)
ALBUMIN SERPL ELPH-MCNC: 3.7 G/DL — SIGNIFICANT CHANGE UP (ref 3.3–5)
ALP SERPL-CCNC: 70 U/L — SIGNIFICANT CHANGE UP (ref 40–120)
ALP SERPL-CCNC: 70 U/L — SIGNIFICANT CHANGE UP (ref 40–120)
ALT FLD-CCNC: 25 U/L — SIGNIFICANT CHANGE UP (ref 12–78)
ALT FLD-CCNC: 25 U/L — SIGNIFICANT CHANGE UP (ref 12–78)
ANION GAP SERPL CALC-SCNC: 10 MMOL/L — SIGNIFICANT CHANGE UP (ref 5–17)
ANION GAP SERPL CALC-SCNC: 10 MMOL/L — SIGNIFICANT CHANGE UP (ref 5–17)
APTT BLD: 28.4 SEC — SIGNIFICANT CHANGE UP (ref 24.5–35.6)
APTT BLD: 28.4 SEC — SIGNIFICANT CHANGE UP (ref 24.5–35.6)
AST SERPL-CCNC: 19 U/L — SIGNIFICANT CHANGE UP (ref 15–37)
AST SERPL-CCNC: 19 U/L — SIGNIFICANT CHANGE UP (ref 15–37)
BASOPHILS # BLD AUTO: 0.09 K/UL — SIGNIFICANT CHANGE UP (ref 0–0.2)
BASOPHILS # BLD AUTO: 0.09 K/UL — SIGNIFICANT CHANGE UP (ref 0–0.2)
BASOPHILS NFR BLD AUTO: 1.1 % — SIGNIFICANT CHANGE UP (ref 0–2)
BASOPHILS NFR BLD AUTO: 1.1 % — SIGNIFICANT CHANGE UP (ref 0–2)
BILIRUB SERPL-MCNC: 0.3 MG/DL — SIGNIFICANT CHANGE UP (ref 0.2–1.2)
BILIRUB SERPL-MCNC: 0.3 MG/DL — SIGNIFICANT CHANGE UP (ref 0.2–1.2)
BUN SERPL-MCNC: 20 MG/DL — SIGNIFICANT CHANGE UP (ref 7–23)
BUN SERPL-MCNC: 20 MG/DL — SIGNIFICANT CHANGE UP (ref 7–23)
CALCIUM SERPL-MCNC: 9.2 MG/DL — SIGNIFICANT CHANGE UP (ref 8.5–10.1)
CALCIUM SERPL-MCNC: 9.2 MG/DL — SIGNIFICANT CHANGE UP (ref 8.5–10.1)
CHLORIDE SERPL-SCNC: 105 MMOL/L — SIGNIFICANT CHANGE UP (ref 96–108)
CHLORIDE SERPL-SCNC: 105 MMOL/L — SIGNIFICANT CHANGE UP (ref 96–108)
CO2 SERPL-SCNC: 25 MMOL/L — SIGNIFICANT CHANGE UP (ref 22–31)
CO2 SERPL-SCNC: 25 MMOL/L — SIGNIFICANT CHANGE UP (ref 22–31)
CREAT SERPL-MCNC: 0.89 MG/DL — SIGNIFICANT CHANGE UP (ref 0.5–1.3)
CREAT SERPL-MCNC: 0.89 MG/DL — SIGNIFICANT CHANGE UP (ref 0.5–1.3)
EGFR: 72 ML/MIN/1.73M2 — SIGNIFICANT CHANGE UP
EGFR: 72 ML/MIN/1.73M2 — SIGNIFICANT CHANGE UP
EOSINOPHIL # BLD AUTO: 0.07 K/UL — SIGNIFICANT CHANGE UP (ref 0–0.5)
EOSINOPHIL # BLD AUTO: 0.07 K/UL — SIGNIFICANT CHANGE UP (ref 0–0.5)
EOSINOPHIL NFR BLD AUTO: 0.9 % — SIGNIFICANT CHANGE UP (ref 0–6)
EOSINOPHIL NFR BLD AUTO: 0.9 % — SIGNIFICANT CHANGE UP (ref 0–6)
GLUCOSE SERPL-MCNC: 88 MG/DL — SIGNIFICANT CHANGE UP (ref 70–99)
GLUCOSE SERPL-MCNC: 88 MG/DL — SIGNIFICANT CHANGE UP (ref 70–99)
HCT VFR BLD CALC: 42.4 % — SIGNIFICANT CHANGE UP (ref 34.5–45)
HCT VFR BLD CALC: 42.4 % — SIGNIFICANT CHANGE UP (ref 34.5–45)
HGB BLD-MCNC: 14.4 G/DL — SIGNIFICANT CHANGE UP (ref 11.5–15.5)
HGB BLD-MCNC: 14.4 G/DL — SIGNIFICANT CHANGE UP (ref 11.5–15.5)
IMM GRANULOCYTES NFR BLD AUTO: 1 % — HIGH (ref 0–0.9)
IMM GRANULOCYTES NFR BLD AUTO: 1 % — HIGH (ref 0–0.9)
INR BLD: 0.96 RATIO — SIGNIFICANT CHANGE UP (ref 0.85–1.18)
INR BLD: 0.96 RATIO — SIGNIFICANT CHANGE UP (ref 0.85–1.18)
LYMPHOCYTES # BLD AUTO: 2.47 K/UL — SIGNIFICANT CHANGE UP (ref 1–3.3)
LYMPHOCYTES # BLD AUTO: 2.47 K/UL — SIGNIFICANT CHANGE UP (ref 1–3.3)
LYMPHOCYTES # BLD AUTO: 31.2 % — SIGNIFICANT CHANGE UP (ref 13–44)
LYMPHOCYTES # BLD AUTO: 31.2 % — SIGNIFICANT CHANGE UP (ref 13–44)
MCHC RBC-ENTMCNC: 31.3 PG — SIGNIFICANT CHANGE UP (ref 27–34)
MCHC RBC-ENTMCNC: 31.3 PG — SIGNIFICANT CHANGE UP (ref 27–34)
MCHC RBC-ENTMCNC: 34 GM/DL — SIGNIFICANT CHANGE UP (ref 32–36)
MCHC RBC-ENTMCNC: 34 GM/DL — SIGNIFICANT CHANGE UP (ref 32–36)
MCV RBC AUTO: 92.2 FL — SIGNIFICANT CHANGE UP (ref 80–100)
MCV RBC AUTO: 92.2 FL — SIGNIFICANT CHANGE UP (ref 80–100)
MONOCYTES # BLD AUTO: 0.68 K/UL — SIGNIFICANT CHANGE UP (ref 0–0.9)
MONOCYTES # BLD AUTO: 0.68 K/UL — SIGNIFICANT CHANGE UP (ref 0–0.9)
MONOCYTES NFR BLD AUTO: 8.6 % — SIGNIFICANT CHANGE UP (ref 2–14)
MONOCYTES NFR BLD AUTO: 8.6 % — SIGNIFICANT CHANGE UP (ref 2–14)
NEUTROPHILS # BLD AUTO: 4.52 K/UL — SIGNIFICANT CHANGE UP (ref 1.8–7.4)
NEUTROPHILS # BLD AUTO: 4.52 K/UL — SIGNIFICANT CHANGE UP (ref 1.8–7.4)
NEUTROPHILS NFR BLD AUTO: 57.2 % — SIGNIFICANT CHANGE UP (ref 43–77)
NEUTROPHILS NFR BLD AUTO: 57.2 % — SIGNIFICANT CHANGE UP (ref 43–77)
NRBC # BLD: 0 /100 WBCS — SIGNIFICANT CHANGE UP (ref 0–0)
NRBC # BLD: 0 /100 WBCS — SIGNIFICANT CHANGE UP (ref 0–0)
NT-PROBNP SERPL-SCNC: 61 PG/ML — SIGNIFICANT CHANGE UP (ref 0–125)
NT-PROBNP SERPL-SCNC: 61 PG/ML — SIGNIFICANT CHANGE UP (ref 0–125)
PLATELET # BLD AUTO: 279 K/UL — SIGNIFICANT CHANGE UP (ref 150–400)
PLATELET # BLD AUTO: 279 K/UL — SIGNIFICANT CHANGE UP (ref 150–400)
POTASSIUM SERPL-MCNC: 3.6 MMOL/L — SIGNIFICANT CHANGE UP (ref 3.5–5.3)
POTASSIUM SERPL-MCNC: 3.6 MMOL/L — SIGNIFICANT CHANGE UP (ref 3.5–5.3)
POTASSIUM SERPL-SCNC: 3.6 MMOL/L — SIGNIFICANT CHANGE UP (ref 3.5–5.3)
POTASSIUM SERPL-SCNC: 3.6 MMOL/L — SIGNIFICANT CHANGE UP (ref 3.5–5.3)
PROT SERPL-MCNC: 7.3 G/DL — SIGNIFICANT CHANGE UP (ref 6–8.3)
PROT SERPL-MCNC: 7.3 G/DL — SIGNIFICANT CHANGE UP (ref 6–8.3)
PROTHROM AB SERPL-ACNC: 11.2 SEC — SIGNIFICANT CHANGE UP (ref 9.5–13)
PROTHROM AB SERPL-ACNC: 11.2 SEC — SIGNIFICANT CHANGE UP (ref 9.5–13)
RBC # BLD: 4.6 M/UL — SIGNIFICANT CHANGE UP (ref 3.8–5.2)
RBC # BLD: 4.6 M/UL — SIGNIFICANT CHANGE UP (ref 3.8–5.2)
RBC # FLD: 12 % — SIGNIFICANT CHANGE UP (ref 10.3–14.5)
RBC # FLD: 12 % — SIGNIFICANT CHANGE UP (ref 10.3–14.5)
SODIUM SERPL-SCNC: 140 MMOL/L — SIGNIFICANT CHANGE UP (ref 135–145)
SODIUM SERPL-SCNC: 140 MMOL/L — SIGNIFICANT CHANGE UP (ref 135–145)
TROPONIN I, HIGH SENSITIVITY RESULT: 7.5 NG/L — SIGNIFICANT CHANGE UP
TROPONIN I, HIGH SENSITIVITY RESULT: 7.5 NG/L — SIGNIFICANT CHANGE UP
TSH SERPL-MCNC: 3.87 UIU/ML — HIGH (ref 0.36–3.74)
TSH SERPL-MCNC: 3.87 UIU/ML — HIGH (ref 0.36–3.74)
WBC # BLD: 7.91 K/UL — SIGNIFICANT CHANGE UP (ref 3.8–10.5)
WBC # BLD: 7.91 K/UL — SIGNIFICANT CHANGE UP (ref 3.8–10.5)
WBC # FLD AUTO: 7.91 K/UL — SIGNIFICANT CHANGE UP (ref 3.8–10.5)
WBC # FLD AUTO: 7.91 K/UL — SIGNIFICANT CHANGE UP (ref 3.8–10.5)

## 2023-10-21 PROCEDURE — 85610 PROTHROMBIN TIME: CPT

## 2023-10-21 PROCEDURE — 93010 ELECTROCARDIOGRAM REPORT: CPT

## 2023-10-21 PROCEDURE — 36415 COLL VENOUS BLD VENIPUNCTURE: CPT

## 2023-10-21 PROCEDURE — 80053 COMPREHEN METABOLIC PANEL: CPT

## 2023-10-21 PROCEDURE — 93005 ELECTROCARDIOGRAM TRACING: CPT

## 2023-10-21 PROCEDURE — 85025 COMPLETE CBC W/AUTO DIFF WBC: CPT

## 2023-10-21 PROCEDURE — 71045 X-RAY EXAM CHEST 1 VIEW: CPT | Mod: 26

## 2023-10-21 PROCEDURE — 71045 X-RAY EXAM CHEST 1 VIEW: CPT

## 2023-10-21 PROCEDURE — 85730 THROMBOPLASTIN TIME PARTIAL: CPT

## 2023-10-21 PROCEDURE — 99285 EMERGENCY DEPT VISIT HI MDM: CPT | Mod: 25

## 2023-10-21 PROCEDURE — 83880 ASSAY OF NATRIURETIC PEPTIDE: CPT

## 2023-10-21 PROCEDURE — 84443 ASSAY THYROID STIM HORMONE: CPT

## 2023-10-21 PROCEDURE — 84484 ASSAY OF TROPONIN QUANT: CPT

## 2023-10-21 PROCEDURE — 99285 EMERGENCY DEPT VISIT HI MDM: CPT | Mod: FS

## 2023-10-21 RX ORDER — SODIUM CHLORIDE 9 MG/ML
1000 INJECTION INTRAMUSCULAR; INTRAVENOUS; SUBCUTANEOUS ONCE
Refills: 0 | Status: COMPLETED | OUTPATIENT
Start: 2023-10-21 | End: 2023-10-21

## 2023-10-21 RX ADMIN — SODIUM CHLORIDE 1000 MILLILITER(S): 9 INJECTION INTRAMUSCULAR; INTRAVENOUS; SUBCUTANEOUS at 19:14

## 2023-10-21 NOTE — ED ADULT NURSE NOTE - OBJECTIVE STATEMENT
a/ox4 patient bibems for palpitations and dizziness that began today. Patient recenrtly d'c from [place] with CSF leak. Patient states she was diaphoretic and became cold and clammy and noticed her HR in the 130's. Hx afib. Pending further labs and radiologt reports at this time. HR 99.

## 2023-10-21 NOTE — ED PROVIDER NOTE - ATTENDING APP SHARED VISIT CONTRIBUTION OF CARE
Patient is a 67-year-old female with a history of paroxysmal A-fib status post colonoscopy 6 months ago at this hospital.  She was placed on a DOAC at that time and has had no further onset of A-fib.  She follows with [facility] cardiology and primary care.  During the course of her post A-fib she began to get headaches.  She eventually went to [facility] approximately 10 days ago where a diagnostic spinal tap was performed and patient was found to have a low pressure.  Her opening pressure on fluoroscopic placement of spinal tap revealed an opening pressure of 7.  She was diagnosed with a spontaneous lumbar epidural leak.  She underwent a blood patch.  And was no longer symptomatic 2 days later.  She now was home resting having resumed her Xarelto yesterday.  She got up to vacuum her home and felt short of breath with palpitations.  She noted her heartbeat to be about 1 10-1 20.  It was regular.  And then she was short of breath.  So EMS was called and she came to the hospital for evaluation.    On evaluation is a well-developed well-nourished female no apparent distress.  HEENT reveals no G/F/R.  No stridor.  No distress.  No bruises or contusions.  Patient is awake and alert.  Neck is supple.  Cardiac exam is regular rate and rhythm without murmur.  Is not tachycardic.  Heart rate is about 100.  Without a murmur.  Lungs clear to auscultation without respiratory distress.  Abdomen is soft and nontender.  Musculoskeletal exam is normal.  Patient has no bony pain to percussion of the lumbar or thoracic spine.  Musculoskeletal exam is otherwise unremarkable.  Skin exam reveals multiple bruises from prior venipunctures.  No evidence of infection.  Neurologic exam is normal.    Plan of care includes laboratory testing D-dimer, cardiac monitoring, EKG, rate control, reassessment.  Patient is on anticoagulants less likely to be a blood clot.  We will continue to monitor and reassess.  This chart was made with dictation software and may contain typographical errors.

## 2023-10-21 NOTE — ED PROVIDER NOTE - OBJECTIVE STATEMENT
Patient is a 67-year-old female with past medical history of A-fib on Eliquis [place]'s GERD recent admission to [facility] for headaches had lumbar puncture showing elevated normal pressure coming in for palpitations shortness of breath exertion.  Patient states she was discharged yesterday got home yesterday and slept for about 12 hours woke up today decided to back in the house and noted to have shortness of breath after vacuuming and going up and down the stairs.  Patient denies any chest pain leg swelling.  Patient states she was restarted on Eliquis yesterday.  Patient states she noted her heart rate was in 116 so called 911.  Patient states she had negative Dopplers of her legs yesterday before discharge.  Patient does states she feels like she is dehydrated and lightheaded. Patient is a 67-year-old female with past medical history of A-fib on Eliquis [place]'s GERD recent admission to [facility] for headaches had lumbar puncture showing low normal opening pressure and needed blood patch coming in for palpitations shortness of breath exertion.  Patient states she was discharged yesterday got home yesterday and slept for about 12 hours woke up today decided to back in the house and noted to have shortness of breath after vacuuming and going up and down the stairs.  Patient denies any chest pain leg swelling.  Patient states she was restarted on Eliquis yesterday.  Patient states she noted her heart rate was in 116 so called 911.  Patient states she had negative Dopplers of her legs yesterday before discharge.  Patient does states she feels like she is dehydrated and lightheaded.

## 2023-10-21 NOTE — ED PROVIDER NOTE - NSFOLLOWUPINSTRUCTIONS_ED_ALL_ED_FT
Follow up with pcp  advised fluids rest  return to er for any worsening symptoms     Dehydration, Adult  Dehydration is a condition in which there is not enough water or other fluids in the body. This happens when a person loses more fluids than they take in. Important organs, such as the kidneys, brain, and heart, cannot function without a proper amount of fluids. Any loss of fluids from the body can lead to dehydration.    Dehydration can be mild, moderate, or severe. It should be treated right away to prevent it from becoming severe.    What are the causes?  Dehydration may be caused by:  Health conditions, such as diarrhea, vomiting, fever, infection, or sweating or urinating a lot.  Not drinking enough fluids.  Certain medicines, such as medicines that remove excess fluid from the body (diuretics).  Lack of safe drinking water.  Not being able to get enough water and food.  What increases the risk?  The following factors may make you more likely to develop this condition:  Having a long-term (chronic) illness that has not been treated properly, such as diabetes, heart disease, or kidney disease.  Being 65 years of age or older.  Having a disability.  Living in a place that is high in altitude, where thinner, drier air causes more fluid loss.  Doing exercises that put stress on your body for a long time (endurance sports).  Being active in a hot climate.  What are the signs or symptoms?  Symptoms of dehydration depend on how severe it is.    Mild or moderate dehydration    Thirst.  Dry lips or dry mouth.  Dizziness or light-headedness.  Muscle cramps.  Dark urine. Urine may be the color of tea.  Less urine or tears produced than usual.  Headache.  Severe dehydration    Changes in skin. Your skin may be cold and clammy, blotchy, or pale. Your skin also may not return to normal after being lightly pinched and released.  Little or no tears, urine, or sweat.  Rapid breathing and low blood pressure. Your pulse may be weak or may be faster than 100 beats per minute when you are sitting still.  Other changes, such as:  Feeling very thirsty.  Sunken eyes.  Cold hands and feet.  Confusion.  Being very tired (lethargic) or having trouble waking from sleep.  Short-term weight loss.  Loss of consciousness.  How is this diagnosed?  This condition is diagnosed based on your symptoms and a physical exam. You may have blood and urine tests to help confirm the diagnosis.    How is this treated?  A person's hand, showing an inserted IV catheter.   Treatment for this condition depends on how severe it is. Treatment should be started right away. Do not wait until dehydration becomes severe. Severe dehydration is an emergency and needs to be treated in a hospital.  Mild or moderate dehydration can be treated at home. You may be asked to:  Drink more fluids.  Drink an oral rehydration solution (ORS). This drink restores fluids, salts, and minerals in the blood (electrolytes).  Stop any activities that caused dehydration, such as exercise.  Cool off with cool compresses, cool mist, or cool fluids, if heat or too much sweat caused your condition.  Take medicine to treat fever, if fever caused your condition.  Take medicine to treat nausea and diarrhea, if vomiting or diarrhea caused your condition.  Severe dehydration can be treated:  With IV fluids.  By correcting abnormal levels of electrolytes in your body.  By treating the underlying cause of dehydration.  Follow these instructions at home:  Oral rehydration solution    A glass of water with a spoonful of ORS ready to be added to it.  If told by your health care provider, drink an ORS:  Make an ORS by following instructions on the package.  Start by drinking small amounts, about ½ cup (120 mL) every 5–10 minutes.  Slowly increase how much you drink until you have taken the amount recommended by your health care provider.  Eating and drinking    A person drinking water from a glass.   Drink enough clear fluid to keep your urine pale yellow. If you were told to drink an ORS, finish the ORS first and then start slowly drinking other clear fluids. Drink fluids such as:  Water. Do not drink only water. Doing that can lead to hyponatremia, which is having too little salt (sodium) in the body.  Water from ice chips you suck on.  Diluted fruit juice. This is fruit juice that you have added water to.  Low-calorie sports drinks.  Eat foods that contain a healthy balance of electrolytes, such as bananas, oranges, potatoes, tomatoes, and spinach.  Do not drink alcohol.  Avoid the following:  Drinks that contain a lot of sugar. These include high-calorie sports drinks, fruit juice that is not diluted, and soda.  Caffeine.  Foods that are greasy or contain a lot of fat or sugar.  General instructions    Take over-the-counter and prescription medicines only as told by your health care provider.  Do not take sodium tablets. Doing that can lead to having too much sodium in the body (hypernatremia).  Return to your normal activities as told by your health care provider. Ask your health care provider what activities are safe for you.  Keep all follow-up visits. Your health care provider may need to check your progress and suggest new ways to treat your condition.  Contact a health care provider if:  You have muscle cramps, pain, or discomfort, such as:  Pain in your abdomen and the pain gets worse or stays in one area.  Stiff neck.  You have a rash.  You are more irritable than usual.  You are sleepier or have a harder time waking.  You feel weak or dizzy.  You feel very thirsty.  Get help right away if:  You have symptoms of severe dehydration.  You vomit every time you eat or drink.  Your vomiting gets worse, does not go away, or includes blood or green matter (bile).  You are getting treatment but symptoms are getting worse.  You have a fever.  You have a severe headache.  You have:  Diarrhea that gets worse or does not go away.  Blood in your stool. This may cause stool to look black and tarry.  Not urinating, or urinating only a small amount of very dark urine, within 6–8 hours.  You have trouble breathing.  These symptoms may be an emergency. Get help right away.  Do not wait to see if the symptoms will go away.  Do not drive yourself to the hospital. Call 911.  This information is not intended to replace advice given to you by your health care provider. Make sure you discuss any questions you have with your health care provider.

## 2023-10-21 NOTE — ED PROVIDER NOTE - NS ED ATTENDING STATEMENT MOD
This was a shared visit with the CHANDAN. I reviewed and verified the documentation and independently performed the documented:

## 2023-10-21 NOTE — ED ADULT TRIAGE NOTE - CHIEF COMPLAINT QUOTE
Patient A/Ox4 with clear speech. BIBA from home for dizziness, palpitations and HTN that started today. Exacerbated by exertion. Was recently in [facility] for CSF leak. Hx of Afib. Denies chest pain. 20G L hand placed by EMS. BGL 90 by EMS.

## 2023-10-21 NOTE — ED ADULT NURSE NOTE - CHIEF COMPLAINT QUOTE
Patient A/Ox4 with clear speech. BIBA from home for dizziness, palpitations and HTN that started today. Exacerbated by exertion. Was recently in [facility] for CSF leak. Hx of Afib. Denies chest pain. 20G L hand placed by EMS. BGL 90 by EMS.

## 2023-10-21 NOTE — ED PROVIDER NOTE - CPE EDP MUSC NORM
Right  implanted port accessed using a 20 3/4gauge non-coring needle primed with 0.9% sodium chloride.  Good blood return obtained.  Blood obtained and sent to lab. Flushed with 30ml 0.9% sodium chloride followed by tegaderm.  Implanted port site is not sensitive to touch, not swollen, not warm and not reddened.  Patient tolerated procedure well.    
normal...

## 2023-10-21 NOTE — ED PROVIDER NOTE - CLINICAL SUMMARY MEDICAL DECISION MAKING FREE TEXT BOX
Patient is a 67-year-old female with a history of paroxysmal A-fib status post colonoscopy 6 months ago at this hospital.  She was placed on a DOAC at that time and has had no further onset of A-fib.  She follows with [facility] cardiology and primary care.  During the course of her post A-fib she began to get headaches.  She eventually went to [facility] approximately 10 days ago where a diagnostic spinal tap was performed and patient was found to have a low pressure.  Her opening pressure on fluoroscopic placement of spinal tap revealed an opening pressure of 7.  She was diagnosed with a spontaneous lumbar epidural leak.  She underwent a blood patch.  And was no longer symptomatic 2 days later.  She now was home resting having resumed her Xarelto yesterday.  She got up to vacuum her home and felt short of breath with palpitations.  She noted her heartbeat to be about 1 10-1 20.  It was regular.  And then she was short of breath.  So EMS was called and she came to the hospital for evaluation.    On evaluation is a well-developed well-nourished female no apparent distress.  HEENT reveals no G/F/R.  No stridor.  No distress.  No bruises or contusions.  Patient is awake and alert.  Neck is supple.  Cardiac exam is regular rate and rhythm without murmur.  Is not tachycardic.  Heart rate is about 100.  Without a murmur.  Lungs clear to auscultation without respiratory distress.  Abdomen is soft and nontender.  Musculoskeletal exam is normal.  Patient has no bony pain to percussion of the lumbar or thoracic spine.  Musculoskeletal exam is otherwise unremarkable.  Skin exam reveals multiple bruises from prior venipunctures.  No evidence of infection.  Neurologic exam is normal.    Plan of care includes laboratory testing D-dimer, cardiac monitoring, EKG, rate control, reassessment.  Patient is on anticoagulants less likely to be a blood clot.  We will continue to monitor and reassess.  This chart was made with dictation software and may contain typographical errors.

## 2023-10-21 NOTE — ED PROVIDER NOTE - PATIENT PORTAL LINK FT
You can access the FollowMyHealth Patient Portal offered by [facility] by registering at the following website: [email or web address]. By joining [employer]’s FollowMyHealth portal, you will also be able to view your health information using other applications (apps) compatible with our system.

## 2023-10-22 PROBLEM — E27.1 PRIMARY ADRENOCORTICAL INSUFFICIENCY: Chronic | Status: ACTIVE | Noted: 2023-07-20

## 2023-10-22 PROBLEM — E05.90 THYROTOXICOSIS, UNSPECIFIED WITHOUT THYROTOXIC CRISIS OR STORM: Chronic | Status: ACTIVE | Noted: 2023-07-20

## 2023-10-22 PROBLEM — Z86.010 PERSONAL HISTORY OF COLON POLYPS: Chronic | Status: ACTIVE | Noted: 2023-07-20

## 2023-10-22 PROBLEM — Z86.010 PERSONAL HISTORY OF COLONIC POLYPS: Chronic | Status: ACTIVE | Noted: 2023-07-20

## 2023-11-13 NOTE — DISCHARGE NOTE PROVIDER - YES NO FOR MLM POSITIVE OR NEGATIVE COVID RESULT
, Rotation Flap Text: The defect edges were debeveled with a #15 scalpel blade. Given the location of the defect, shape of the defect and the proximity to free margins a rotation flap was deemed most appropriate. Using a sterile surgical marker, an appropriate rotation flap was drawn incorporating the defect and placing the expected incisions within the relaxed skin tension lines where possible. The area thus outlined was incised deep to adipose tissue with a #15 scalpel blade. The skin margins were undermined to an appropriate distance in all directions utilizing iris scissors. Following this, the designed flap was carried over into the primary defect and sutured into place.

## 2023-12-26 ENCOUNTER — EMERGENCY (EMERGENCY)
Facility: HOSPITAL | Age: 68
LOS: 1 days | End: 2023-12-26
Attending: STUDENT IN AN ORGANIZED HEALTH CARE EDUCATION/TRAINING PROGRAM
Payer: MEDICARE

## 2023-12-26 VITALS
TEMPERATURE: 97 F | DIASTOLIC BLOOD PRESSURE: 83 MMHG | RESPIRATION RATE: 20 BRPM | SYSTOLIC BLOOD PRESSURE: 159 MMHG | WEIGHT: 145.06 LBS | HEART RATE: 108 BPM | HEIGHT: 65 IN | OXYGEN SATURATION: 99 %

## 2023-12-26 VITALS
DIASTOLIC BLOOD PRESSURE: 83 MMHG | TEMPERATURE: 98 F | OXYGEN SATURATION: 98 % | HEART RATE: 98 BPM | SYSTOLIC BLOOD PRESSURE: 161 MMHG | RESPIRATION RATE: 18 BRPM

## 2023-12-26 DIAGNOSIS — Z98.890 OTHER SPECIFIED POSTPROCEDURAL STATES: Chronic | ICD-10-CM

## 2023-12-26 DIAGNOSIS — Z98.89 OTHER SPECIFIED POSTPROCEDURAL STATES: Chronic | ICD-10-CM

## 2023-12-26 LAB
ALBUMIN SERPL ELPH-MCNC: 3.9 G/DL — SIGNIFICANT CHANGE UP (ref 3.3–5)
ALBUMIN SERPL ELPH-MCNC: 3.9 G/DL — SIGNIFICANT CHANGE UP (ref 3.3–5)
ALP SERPL-CCNC: 67 U/L — SIGNIFICANT CHANGE UP (ref 40–120)
ALP SERPL-CCNC: 67 U/L — SIGNIFICANT CHANGE UP (ref 40–120)
ALT FLD-CCNC: 24 U/L — SIGNIFICANT CHANGE UP (ref 12–78)
ALT FLD-CCNC: 24 U/L — SIGNIFICANT CHANGE UP (ref 12–78)
ANION GAP SERPL CALC-SCNC: 6 MMOL/L — SIGNIFICANT CHANGE UP (ref 5–17)
ANION GAP SERPL CALC-SCNC: 6 MMOL/L — SIGNIFICANT CHANGE UP (ref 5–17)
APTT BLD: 28.1 SEC — SIGNIFICANT CHANGE UP (ref 24.5–35.6)
APTT BLD: 28.1 SEC — SIGNIFICANT CHANGE UP (ref 24.5–35.6)
AST SERPL-CCNC: 24 U/L — SIGNIFICANT CHANGE UP (ref 15–37)
AST SERPL-CCNC: 24 U/L — SIGNIFICANT CHANGE UP (ref 15–37)
BASOPHILS # BLD AUTO: 0.07 K/UL — SIGNIFICANT CHANGE UP (ref 0–0.2)
BASOPHILS # BLD AUTO: 0.07 K/UL — SIGNIFICANT CHANGE UP (ref 0–0.2)
BASOPHILS NFR BLD AUTO: 0.8 % — SIGNIFICANT CHANGE UP (ref 0–2)
BASOPHILS NFR BLD AUTO: 0.8 % — SIGNIFICANT CHANGE UP (ref 0–2)
BILIRUB SERPL-MCNC: 0.5 MG/DL — SIGNIFICANT CHANGE UP (ref 0.2–1.2)
BILIRUB SERPL-MCNC: 0.5 MG/DL — SIGNIFICANT CHANGE UP (ref 0.2–1.2)
BUN SERPL-MCNC: 16 MG/DL — SIGNIFICANT CHANGE UP (ref 7–23)
BUN SERPL-MCNC: 16 MG/DL — SIGNIFICANT CHANGE UP (ref 7–23)
CALCIUM SERPL-MCNC: 9.4 MG/DL — SIGNIFICANT CHANGE UP (ref 8.5–10.1)
CALCIUM SERPL-MCNC: 9.4 MG/DL — SIGNIFICANT CHANGE UP (ref 8.5–10.1)
CHLORIDE SERPL-SCNC: 105 MMOL/L — SIGNIFICANT CHANGE UP (ref 96–108)
CHLORIDE SERPL-SCNC: 105 MMOL/L — SIGNIFICANT CHANGE UP (ref 96–108)
CO2 SERPL-SCNC: 29 MMOL/L — SIGNIFICANT CHANGE UP (ref 22–31)
CO2 SERPL-SCNC: 29 MMOL/L — SIGNIFICANT CHANGE UP (ref 22–31)
CREAT SERPL-MCNC: 0.81 MG/DL — SIGNIFICANT CHANGE UP (ref 0.5–1.3)
CREAT SERPL-MCNC: 0.81 MG/DL — SIGNIFICANT CHANGE UP (ref 0.5–1.3)
D DIMER BLD IA.RAPID-MCNC: <150 NG/ML DDU — SIGNIFICANT CHANGE UP
D DIMER BLD IA.RAPID-MCNC: <150 NG/ML DDU — SIGNIFICANT CHANGE UP
EGFR: 80 ML/MIN/1.73M2 — SIGNIFICANT CHANGE UP
EGFR: 80 ML/MIN/1.73M2 — SIGNIFICANT CHANGE UP
EOSINOPHIL # BLD AUTO: 0.04 K/UL — SIGNIFICANT CHANGE UP (ref 0–0.5)
EOSINOPHIL # BLD AUTO: 0.04 K/UL — SIGNIFICANT CHANGE UP (ref 0–0.5)
EOSINOPHIL NFR BLD AUTO: 0.5 % — SIGNIFICANT CHANGE UP (ref 0–6)
EOSINOPHIL NFR BLD AUTO: 0.5 % — SIGNIFICANT CHANGE UP (ref 0–6)
GLUCOSE SERPL-MCNC: 145 MG/DL — HIGH (ref 70–99)
GLUCOSE SERPL-MCNC: 145 MG/DL — HIGH (ref 70–99)
HCT VFR BLD CALC: 43.5 % — SIGNIFICANT CHANGE UP (ref 34.5–45)
HCT VFR BLD CALC: 43.5 % — SIGNIFICANT CHANGE UP (ref 34.5–45)
HGB BLD-MCNC: 14.8 G/DL — SIGNIFICANT CHANGE UP (ref 11.5–15.5)
HGB BLD-MCNC: 14.8 G/DL — SIGNIFICANT CHANGE UP (ref 11.5–15.5)
IMM GRANULOCYTES NFR BLD AUTO: 0.8 % — SIGNIFICANT CHANGE UP (ref 0–0.9)
IMM GRANULOCYTES NFR BLD AUTO: 0.8 % — SIGNIFICANT CHANGE UP (ref 0–0.9)
INR BLD: 0.84 RATIO — LOW (ref 0.85–1.18)
INR BLD: 0.84 RATIO — LOW (ref 0.85–1.18)
LYMPHOCYTES # BLD AUTO: 1.97 K/UL — SIGNIFICANT CHANGE UP (ref 1–3.3)
LYMPHOCYTES # BLD AUTO: 1.97 K/UL — SIGNIFICANT CHANGE UP (ref 1–3.3)
LYMPHOCYTES # BLD AUTO: 23.3 % — SIGNIFICANT CHANGE UP (ref 13–44)
LYMPHOCYTES # BLD AUTO: 23.3 % — SIGNIFICANT CHANGE UP (ref 13–44)
MAGNESIUM SERPL-MCNC: 2.2 MG/DL — SIGNIFICANT CHANGE UP (ref 1.6–2.6)
MAGNESIUM SERPL-MCNC: 2.2 MG/DL — SIGNIFICANT CHANGE UP (ref 1.6–2.6)
MCHC RBC-ENTMCNC: 31.6 PG — SIGNIFICANT CHANGE UP (ref 27–34)
MCHC RBC-ENTMCNC: 31.6 PG — SIGNIFICANT CHANGE UP (ref 27–34)
MCHC RBC-ENTMCNC: 34 GM/DL — SIGNIFICANT CHANGE UP (ref 32–36)
MCHC RBC-ENTMCNC: 34 GM/DL — SIGNIFICANT CHANGE UP (ref 32–36)
MCV RBC AUTO: 92.8 FL — SIGNIFICANT CHANGE UP (ref 80–100)
MCV RBC AUTO: 92.8 FL — SIGNIFICANT CHANGE UP (ref 80–100)
MONOCYTES # BLD AUTO: 0.45 K/UL — SIGNIFICANT CHANGE UP (ref 0–0.9)
MONOCYTES # BLD AUTO: 0.45 K/UL — SIGNIFICANT CHANGE UP (ref 0–0.9)
MONOCYTES NFR BLD AUTO: 5.3 % — SIGNIFICANT CHANGE UP (ref 2–14)
MONOCYTES NFR BLD AUTO: 5.3 % — SIGNIFICANT CHANGE UP (ref 2–14)
NEUTROPHILS # BLD AUTO: 5.85 K/UL — SIGNIFICANT CHANGE UP (ref 1.8–7.4)
NEUTROPHILS # BLD AUTO: 5.85 K/UL — SIGNIFICANT CHANGE UP (ref 1.8–7.4)
NEUTROPHILS NFR BLD AUTO: 69.3 % — SIGNIFICANT CHANGE UP (ref 43–77)
NEUTROPHILS NFR BLD AUTO: 69.3 % — SIGNIFICANT CHANGE UP (ref 43–77)
NRBC # BLD: 0 /100 WBCS — SIGNIFICANT CHANGE UP (ref 0–0)
NRBC # BLD: 0 /100 WBCS — SIGNIFICANT CHANGE UP (ref 0–0)
NT-PROBNP SERPL-SCNC: 96 PG/ML — SIGNIFICANT CHANGE UP (ref 0–125)
NT-PROBNP SERPL-SCNC: 96 PG/ML — SIGNIFICANT CHANGE UP (ref 0–125)
PLATELET # BLD AUTO: 330 K/UL — SIGNIFICANT CHANGE UP (ref 150–400)
PLATELET # BLD AUTO: 330 K/UL — SIGNIFICANT CHANGE UP (ref 150–400)
POTASSIUM SERPL-MCNC: 3.5 MMOL/L — SIGNIFICANT CHANGE UP (ref 3.5–5.3)
POTASSIUM SERPL-MCNC: 3.5 MMOL/L — SIGNIFICANT CHANGE UP (ref 3.5–5.3)
POTASSIUM SERPL-SCNC: 3.5 MMOL/L — SIGNIFICANT CHANGE UP (ref 3.5–5.3)
POTASSIUM SERPL-SCNC: 3.5 MMOL/L — SIGNIFICANT CHANGE UP (ref 3.5–5.3)
PROT SERPL-MCNC: 7.4 G/DL — SIGNIFICANT CHANGE UP (ref 6–8.3)
PROT SERPL-MCNC: 7.4 G/DL — SIGNIFICANT CHANGE UP (ref 6–8.3)
PROTHROM AB SERPL-ACNC: 9.9 SEC — SIGNIFICANT CHANGE UP (ref 9.5–13)
PROTHROM AB SERPL-ACNC: 9.9 SEC — SIGNIFICANT CHANGE UP (ref 9.5–13)
RBC # BLD: 4.69 M/UL — SIGNIFICANT CHANGE UP (ref 3.8–5.2)
RBC # BLD: 4.69 M/UL — SIGNIFICANT CHANGE UP (ref 3.8–5.2)
RBC # FLD: 12.2 % — SIGNIFICANT CHANGE UP (ref 10.3–14.5)
RBC # FLD: 12.2 % — SIGNIFICANT CHANGE UP (ref 10.3–14.5)
SODIUM SERPL-SCNC: 140 MMOL/L — SIGNIFICANT CHANGE UP (ref 135–145)
SODIUM SERPL-SCNC: 140 MMOL/L — SIGNIFICANT CHANGE UP (ref 135–145)
TROPONIN I, HIGH SENSITIVITY RESULT: 5.9 NG/L — SIGNIFICANT CHANGE UP
TROPONIN I, HIGH SENSITIVITY RESULT: 5.9 NG/L — SIGNIFICANT CHANGE UP
TSH SERPL-MCNC: 2.24 UIU/ML — SIGNIFICANT CHANGE UP (ref 0.36–3.74)
TSH SERPL-MCNC: 2.24 UIU/ML — SIGNIFICANT CHANGE UP (ref 0.36–3.74)
WBC # BLD: 8.45 K/UL — SIGNIFICANT CHANGE UP (ref 3.8–10.5)
WBC # BLD: 8.45 K/UL — SIGNIFICANT CHANGE UP (ref 3.8–10.5)
WBC # FLD AUTO: 8.45 K/UL — SIGNIFICANT CHANGE UP (ref 3.8–10.5)
WBC # FLD AUTO: 8.45 K/UL — SIGNIFICANT CHANGE UP (ref 3.8–10.5)

## 2023-12-26 PROCEDURE — 85730 THROMBOPLASTIN TIME PARTIAL: CPT

## 2023-12-26 PROCEDURE — 85025 COMPLETE CBC W/AUTO DIFF WBC: CPT

## 2023-12-26 PROCEDURE — 99285 EMERGENCY DEPT VISIT HI MDM: CPT

## 2023-12-26 PROCEDURE — 96374 THER/PROPH/DIAG INJ IV PUSH: CPT

## 2023-12-26 PROCEDURE — 80053 COMPREHEN METABOLIC PANEL: CPT

## 2023-12-26 PROCEDURE — 93971 EXTREMITY STUDY: CPT

## 2023-12-26 PROCEDURE — 84484 ASSAY OF TROPONIN QUANT: CPT

## 2023-12-26 PROCEDURE — 93005 ELECTROCARDIOGRAM TRACING: CPT

## 2023-12-26 PROCEDURE — 71045 X-RAY EXAM CHEST 1 VIEW: CPT | Mod: 26

## 2023-12-26 PROCEDURE — 71045 X-RAY EXAM CHEST 1 VIEW: CPT

## 2023-12-26 PROCEDURE — 93971 EXTREMITY STUDY: CPT | Mod: 26,LT

## 2023-12-26 PROCEDURE — 93010 ELECTROCARDIOGRAM REPORT: CPT

## 2023-12-26 PROCEDURE — 85379 FIBRIN DEGRADATION QUANT: CPT

## 2023-12-26 PROCEDURE — 85610 PROTHROMBIN TIME: CPT

## 2023-12-26 PROCEDURE — 36415 COLL VENOUS BLD VENIPUNCTURE: CPT

## 2023-12-26 PROCEDURE — 83880 ASSAY OF NATRIURETIC PEPTIDE: CPT

## 2023-12-26 PROCEDURE — 84443 ASSAY THYROID STIM HORMONE: CPT

## 2023-12-26 PROCEDURE — 99285 EMERGENCY DEPT VISIT HI MDM: CPT | Mod: 25

## 2023-12-26 PROCEDURE — 83735 ASSAY OF MAGNESIUM: CPT

## 2023-12-26 RX ORDER — SODIUM CHLORIDE 9 MG/ML
1000 INJECTION INTRAMUSCULAR; INTRAVENOUS; SUBCUTANEOUS ONCE
Refills: 0 | Status: COMPLETED | OUTPATIENT
Start: 2023-12-26 | End: 2023-12-26

## 2023-12-26 RX ORDER — ACETAMINOPHEN 500 MG
1000 TABLET ORAL ONCE
Refills: 0 | Status: COMPLETED | OUTPATIENT
Start: 2023-12-26 | End: 2023-12-26

## 2023-12-26 RX ADMIN — SODIUM CHLORIDE 1000 MILLILITER(S): 9 INJECTION INTRAMUSCULAR; INTRAVENOUS; SUBCUTANEOUS at 22:07

## 2023-12-26 RX ADMIN — Medication 400 MILLIGRAM(S): at 22:58

## 2023-12-26 NOTE — ED PROVIDER NOTE - PATIENT PORTAL LINK FT
You can access the FollowMyHealth Patient Portal offered by [facility] by registering at the following website: [email or web address]. By joining [employer]’s FollowMyHealth portal, you will also be able to view your health information using other applications (apps) compatible with our system. You can access the FollowMyHealth Patient Portal offered by [facility] by registering at the following website: [email or web address]. By joining [employer]’s FollowMyHealth portal, you will also be able to view your health information using other applications (apps) compatible with our system.

## 2023-12-26 NOTE — ED PROVIDER NOTE - OBJECTIVE STATEMENT
68-year-old female history of Graves' disease, A-fib, CSF leak here complaining of heart pounding with elevated blood pressure or shortness of breath and feeling agitated.  Ongoing for about a month but worse today.  Patient noted that her TSH was dropping in her outpatient labs.

## 2023-12-26 NOTE — ED ADULT NURSE NOTE - NSICDXPASTSURGICALHX_GEN_ALL_CORE_FT
PAST SURGICAL HISTORY:  H/O exploratory laparotomy endometriosis 1984    S/P colonoscopic polypectomy     S/P laparotomy 1985    Vocal cord polyp 2009

## 2023-12-26 NOTE — ED ADULT NURSE NOTE - NSFALLUNIVINTERV_ED_ALL_ED
Bed/Stretcher in lowest position, wheels locked, appropriate side rails in place/Call bell, personal items and telephone in reach/Instruct patient to call for assistance before getting out of bed/chair/stretcher/Non-slip footwear applied when patient is off stretcher/[place] to call system/Physically safe environment - no spills, clutter or unnecessary equipment/Purposeful proactive rounding/Room/bathroom lighting operational, light cord in reach Bed/Stretcher in lowest position, wheels locked, appropriate side rails in place/Call bell, personal items and telephone in reach/Instruct patient to call for assistance before getting out of bed/chair/stretcher/Non-slip footwear applied when patient is off stretcher/[place] to call system/Physically safe environment - no spills, clutter or unnecessary equipment/Purposeful proactive rounding/Room/bathroom lighting operational, light cord in reach

## 2023-12-26 NOTE — ED PROVIDER NOTE - NSICDXPASTMEDICALHX_GEN_ALL_CORE_FT
PAST MEDICAL HISTORY:  Harsha disease     Addisons disease     GERD (gastroesophageal reflux disease)     Hiatal hernia     History of colonic polyps     Hyperthyroidism     MVP (mitral valve prolapse)     Thyroid disease

## 2023-12-26 NOTE — ED PROVIDER NOTE - NSICDXPASTSURGICALHX_GEN_ALL_CORE_FT
PAST SURGICAL HISTORY:  H/O exploratory laparotomy endometriosis 1984    S/P colonoscopic polypectomy     S/P laparotomy 1985    Vocal cord polyp 2009     3

## 2023-12-26 NOTE — ED PROVIDER NOTE - PHYSICAL EXAMINATION
Vital signs as available reviewed.  General:  No acute distress.  Head:  Normocephalic, atraumatic.  Eyes:  Conjunctiva pink, no icterus.  Cardiovascular:  tachycardia  Respiratory:  Clear to auscultation, good air entry bilaterally.  Abdomen:  Soft, non-tender.  Musculoskeletal:  No obvious deformity. left foot in cam boot a swollen and tender calf.  Neurologic: Alert and oriented, moving all extremities.  Skin:  Warm and dry.

## 2023-12-26 NOTE — ED PROVIDER NOTE - WR INTERPRETATION 1
Advise pt that his BNP, test for heart failure, has improved.  Continue the furosemide 40mg daily.    He is anemic but the blood count is stable.    His diabetes is well controlled.  Continue current diabetic meds.    Follow up with cardiology as directed next week. CXR negative - No pneumothorax, No opacities, No free air

## 2023-12-26 NOTE — ED ADULT TRIAGE NOTE - CHIEF COMPLAINT QUOTE
I have been feeling really crappy. I had a spontaneous cerebral spinal fluid leak in october and had a blood patch. I am wondering if I have a thyroid issue. I need to have my thyroid check. I feel shakey, my HR was 114 at home. I feel buzzing in my head. I feel SOB at times

## 2023-12-26 NOTE — ED PROVIDER NOTE - CLINICAL SUMMARY MEDICAL DECISION MAKING FREE TEXT BOX
Here with intermittent palpitations and shortness of breath worsening for the past month.  Check labs, chest x-ray, ultrasound, give fluids, EKG, reevaluate.

## 2023-12-26 NOTE — ED PROVIDER NOTE - NSFOLLOWUPINSTRUCTIONS_ED_ALL_ED_FT
Please follow up with your Primary Care Physician and any specialists as discussed- Cardiology, neurology.  Please take your medications as prescribed and or instructed.  If your symptoms persist or worsen, please seek care. Either return to the Emergency Department, go to urgent care or see your primary care doctor.  Please refer to general information and instructions attached or below:       Heart Palpitations    WHAT YOU NEED TO KNOW:    Heart palpitations are feelings that your heart races, jumps, throbs, or flutters. You may feel extra beats, no beats for a short time, or skipped beats. You may have these feelings in your chest, throat, or neck. They may happen when you are sitting, standing, or lying. Heart palpitations may be frightening, but are usually not caused by a serious problem.     DISCHARGE INSTRUCTIONS:    Call 911 or have someone else call for any of the following:   •You have any of the following signs of a heart attack: ?Squeezing, pressure, or pain in your chest      ?You may also have any of the following: ?Discomfort or pain in your back, neck, jaw, stomach, or arm      ?Shortness of breath      ?Nausea or vomiting      ?Lightheadedness or a sudden cold sweat        •You have any of the following signs of a stroke: ?Numbness or drooping on one side of your face       ?Weakness in an arm or leg      ?Confusion or difficulty speaking      ?Dizziness, a severe headache, or vision loss      •You faint or lose consciousness.       Return to the emergency department if:   •Your palpitations happen more often or get more intense.           Contact your healthcare provider if:   •You have new or worsening swelling in your feet or ankles.      •You have questions or concerns about your condition or care.      Follow up with your healthcare provider as directed: You may need to follow up with a cardiologist. You may need tests to check for heart problems that cause palpitations. Write down your questions so you remember to ask them during your visits.     Keep a record: Write down when your palpitations start and stop, what you were doing when they started, and your symptoms. Keep track of what you ate or drank within a few hours of your palpitations. Include anything that seemed to help your symptoms, such as lying down or holding your breath. This record will help you and your healthcare provider learn what triggers your palpitations. Bring this record with you to your follow up visits.    Help prevent heart palpitations:   •Manage stress and anxiety. Find ways to relax such as listening to music, meditating, or doing yoga. Exercise can also help decrease stress and anxiety. Talk to someone you trust about your stress or anxiety. You can also talk to a therapist.       •Get plenty of sleep every night. Ask your healthcare provider how much sleep you need each night.       •Do not drink caffeine or alcohol. Caffeine and alcohol can make your palpitations worse. Caffeine is found in soda, coffee, tea, chocolate, and drinks that increase your energy.       •Do not smoke. Nicotine and other chemicals in cigarettes and cigars may damage your heart and blood vessels. Ask your healthcare provider for information if you currently smoke and need help to quit. E-cigarettes or smokeless tobacco still contain nicotine. Talk to your healthcare provider before you use these products.       •Do not use illegal drugs. Talk to your healthcare provider if you use illegal drugs and want help to quit.

## 2023-12-31 ENCOUNTER — INPATIENT (INPATIENT)
Facility: HOSPITAL | Age: 68
LOS: 0 days | Discharge: HOME | DRG: 309 | End: 2024-01-01
Attending: INTERNAL MEDICINE | Admitting: INTERNAL MEDICINE
Payer: COMMERCIAL

## 2023-12-31 VITALS
OXYGEN SATURATION: 100 % | RESPIRATION RATE: 18 BRPM | TEMPERATURE: 98 F | SYSTOLIC BLOOD PRESSURE: 154 MMHG | WEIGHT: 149.91 LBS | HEIGHT: 65 IN | DIASTOLIC BLOOD PRESSURE: 101 MMHG | HEART RATE: 90 BPM

## 2023-12-31 DIAGNOSIS — Z98.890 OTHER SPECIFIED POSTPROCEDURAL STATES: Chronic | ICD-10-CM

## 2023-12-31 DIAGNOSIS — R00.2 PALPITATIONS: ICD-10-CM

## 2023-12-31 DIAGNOSIS — Z29.9 ENCOUNTER FOR PROPHYLACTIC MEASURES, UNSPECIFIED: ICD-10-CM

## 2023-12-31 DIAGNOSIS — I48.0 PAROXYSMAL ATRIAL FIBRILLATION: ICD-10-CM

## 2023-12-31 DIAGNOSIS — S99.199A OTHER PHYSEAL FRACTURE OF UNSPECIFIED METATARSAL, INITIAL ENCOUNTER FOR CLOSED FRACTURE: ICD-10-CM

## 2023-12-31 DIAGNOSIS — E27.1 PRIMARY ADRENOCORTICAL INSUFFICIENCY: ICD-10-CM

## 2023-12-31 DIAGNOSIS — E87.6 HYPOKALEMIA: ICD-10-CM

## 2023-12-31 DIAGNOSIS — Z98.89 OTHER SPECIFIED POSTPROCEDURAL STATES: Chronic | ICD-10-CM

## 2023-12-31 DIAGNOSIS — E05.00 THYROTOXICOSIS WITH DIFFUSE GOITER WITHOUT THYROTOXIC CRISIS OR STORM: ICD-10-CM

## 2023-12-31 DIAGNOSIS — R93.89 ABNORMAL FINDINGS ON DIAGNOSTIC IMAGING OF OTHER SPECIFIED BODY STRUCTURES: ICD-10-CM

## 2023-12-31 LAB
ALBUMIN SERPL ELPH-MCNC: 3.4 G/DL — SIGNIFICANT CHANGE UP (ref 3.3–5)
ALBUMIN SERPL ELPH-MCNC: 3.4 G/DL — SIGNIFICANT CHANGE UP (ref 3.3–5)
ALP SERPL-CCNC: 59 U/L — SIGNIFICANT CHANGE UP (ref 40–120)
ALP SERPL-CCNC: 59 U/L — SIGNIFICANT CHANGE UP (ref 40–120)
ALT FLD-CCNC: 23 U/L — SIGNIFICANT CHANGE UP (ref 12–78)
ALT FLD-CCNC: 23 U/L — SIGNIFICANT CHANGE UP (ref 12–78)
ANION GAP SERPL CALC-SCNC: 6 MMOL/L — SIGNIFICANT CHANGE UP (ref 5–17)
ANION GAP SERPL CALC-SCNC: 6 MMOL/L — SIGNIFICANT CHANGE UP (ref 5–17)
APPEARANCE UR: CLEAR — SIGNIFICANT CHANGE UP
APPEARANCE UR: CLEAR — SIGNIFICANT CHANGE UP
AST SERPL-CCNC: 19 U/L — SIGNIFICANT CHANGE UP (ref 15–37)
AST SERPL-CCNC: 19 U/L — SIGNIFICANT CHANGE UP (ref 15–37)
BASOPHILS # BLD AUTO: 0.07 K/UL — SIGNIFICANT CHANGE UP (ref 0–0.2)
BASOPHILS # BLD AUTO: 0.07 K/UL — SIGNIFICANT CHANGE UP (ref 0–0.2)
BASOPHILS NFR BLD AUTO: 0.8 % — SIGNIFICANT CHANGE UP (ref 0–2)
BASOPHILS NFR BLD AUTO: 0.8 % — SIGNIFICANT CHANGE UP (ref 0–2)
BILIRUB SERPL-MCNC: 0.3 MG/DL — SIGNIFICANT CHANGE UP (ref 0.2–1.2)
BILIRUB SERPL-MCNC: 0.3 MG/DL — SIGNIFICANT CHANGE UP (ref 0.2–1.2)
BILIRUB UR-MCNC: NEGATIVE — SIGNIFICANT CHANGE UP
BILIRUB UR-MCNC: NEGATIVE — SIGNIFICANT CHANGE UP
BUN SERPL-MCNC: 16 MG/DL — SIGNIFICANT CHANGE UP (ref 7–23)
BUN SERPL-MCNC: 16 MG/DL — SIGNIFICANT CHANGE UP (ref 7–23)
CALCIUM SERPL-MCNC: 8.9 MG/DL — SIGNIFICANT CHANGE UP (ref 8.5–10.1)
CALCIUM SERPL-MCNC: 8.9 MG/DL — SIGNIFICANT CHANGE UP (ref 8.5–10.1)
CHLORIDE SERPL-SCNC: 108 MMOL/L — SIGNIFICANT CHANGE UP (ref 96–108)
CHLORIDE SERPL-SCNC: 108 MMOL/L — SIGNIFICANT CHANGE UP (ref 96–108)
CO2 SERPL-SCNC: 28 MMOL/L — SIGNIFICANT CHANGE UP (ref 22–31)
CO2 SERPL-SCNC: 28 MMOL/L — SIGNIFICANT CHANGE UP (ref 22–31)
COLOR SPEC: YELLOW — SIGNIFICANT CHANGE UP
COLOR SPEC: YELLOW — SIGNIFICANT CHANGE UP
CREAT SERPL-MCNC: 0.9 MG/DL — SIGNIFICANT CHANGE UP (ref 0.5–1.3)
CREAT SERPL-MCNC: 0.9 MG/DL — SIGNIFICANT CHANGE UP (ref 0.5–1.3)
D DIMER BLD IA.RAPID-MCNC: <150 NG/ML DDU — SIGNIFICANT CHANGE UP
D DIMER BLD IA.RAPID-MCNC: <150 NG/ML DDU — SIGNIFICANT CHANGE UP
DIFF PNL FLD: NEGATIVE — SIGNIFICANT CHANGE UP
DIFF PNL FLD: NEGATIVE — SIGNIFICANT CHANGE UP
EGFR: 71 ML/MIN/1.73M2 — SIGNIFICANT CHANGE UP
EGFR: 71 ML/MIN/1.73M2 — SIGNIFICANT CHANGE UP
EOSINOPHIL # BLD AUTO: 0.1 K/UL — SIGNIFICANT CHANGE UP (ref 0–0.5)
EOSINOPHIL # BLD AUTO: 0.1 K/UL — SIGNIFICANT CHANGE UP (ref 0–0.5)
EOSINOPHIL NFR BLD AUTO: 1.2 % — SIGNIFICANT CHANGE UP (ref 0–6)
EOSINOPHIL NFR BLD AUTO: 1.2 % — SIGNIFICANT CHANGE UP (ref 0–6)
GLUCOSE SERPL-MCNC: 73 MG/DL — SIGNIFICANT CHANGE UP (ref 70–99)
GLUCOSE SERPL-MCNC: 73 MG/DL — SIGNIFICANT CHANGE UP (ref 70–99)
GLUCOSE UR QL: NEGATIVE MG/DL — SIGNIFICANT CHANGE UP
GLUCOSE UR QL: NEGATIVE MG/DL — SIGNIFICANT CHANGE UP
HCT VFR BLD CALC: 43.6 % — SIGNIFICANT CHANGE UP (ref 34.5–45)
HCT VFR BLD CALC: 43.6 % — SIGNIFICANT CHANGE UP (ref 34.5–45)
HGB BLD-MCNC: 14.7 G/DL — SIGNIFICANT CHANGE UP (ref 11.5–15.5)
HGB BLD-MCNC: 14.7 G/DL — SIGNIFICANT CHANGE UP (ref 11.5–15.5)
IMM GRANULOCYTES NFR BLD AUTO: 0.8 % — SIGNIFICANT CHANGE UP (ref 0–0.9)
IMM GRANULOCYTES NFR BLD AUTO: 0.8 % — SIGNIFICANT CHANGE UP (ref 0–0.9)
KETONES UR-MCNC: NEGATIVE MG/DL — SIGNIFICANT CHANGE UP
KETONES UR-MCNC: NEGATIVE MG/DL — SIGNIFICANT CHANGE UP
LEUKOCYTE ESTERASE UR-ACNC: NEGATIVE — SIGNIFICANT CHANGE UP
LEUKOCYTE ESTERASE UR-ACNC: NEGATIVE — SIGNIFICANT CHANGE UP
LYMPHOCYTES # BLD AUTO: 1.43 K/UL — SIGNIFICANT CHANGE UP (ref 1–3.3)
LYMPHOCYTES # BLD AUTO: 1.43 K/UL — SIGNIFICANT CHANGE UP (ref 1–3.3)
LYMPHOCYTES # BLD AUTO: 16.5 % — SIGNIFICANT CHANGE UP (ref 13–44)
LYMPHOCYTES # BLD AUTO: 16.5 % — SIGNIFICANT CHANGE UP (ref 13–44)
MAGNESIUM SERPL-MCNC: 2 MG/DL — SIGNIFICANT CHANGE UP (ref 1.6–2.6)
MAGNESIUM SERPL-MCNC: 2 MG/DL — SIGNIFICANT CHANGE UP (ref 1.6–2.6)
MCHC RBC-ENTMCNC: 31.3 PG — SIGNIFICANT CHANGE UP (ref 27–34)
MCHC RBC-ENTMCNC: 31.3 PG — SIGNIFICANT CHANGE UP (ref 27–34)
MCHC RBC-ENTMCNC: 33.7 GM/DL — SIGNIFICANT CHANGE UP (ref 32–36)
MCHC RBC-ENTMCNC: 33.7 GM/DL — SIGNIFICANT CHANGE UP (ref 32–36)
MCV RBC AUTO: 92.8 FL — SIGNIFICANT CHANGE UP (ref 80–100)
MCV RBC AUTO: 92.8 FL — SIGNIFICANT CHANGE UP (ref 80–100)
MONOCYTES # BLD AUTO: 0.57 K/UL — SIGNIFICANT CHANGE UP (ref 0–0.9)
MONOCYTES # BLD AUTO: 0.57 K/UL — SIGNIFICANT CHANGE UP (ref 0–0.9)
MONOCYTES NFR BLD AUTO: 6.6 % — SIGNIFICANT CHANGE UP (ref 2–14)
MONOCYTES NFR BLD AUTO: 6.6 % — SIGNIFICANT CHANGE UP (ref 2–14)
NEUTROPHILS # BLD AUTO: 6.42 K/UL — SIGNIFICANT CHANGE UP (ref 1.8–7.4)
NEUTROPHILS # BLD AUTO: 6.42 K/UL — SIGNIFICANT CHANGE UP (ref 1.8–7.4)
NEUTROPHILS NFR BLD AUTO: 74.1 % — SIGNIFICANT CHANGE UP (ref 43–77)
NEUTROPHILS NFR BLD AUTO: 74.1 % — SIGNIFICANT CHANGE UP (ref 43–77)
NITRITE UR-MCNC: NEGATIVE — SIGNIFICANT CHANGE UP
NITRITE UR-MCNC: NEGATIVE — SIGNIFICANT CHANGE UP
NRBC # BLD: 0 /100 WBCS — SIGNIFICANT CHANGE UP (ref 0–0)
NRBC # BLD: 0 /100 WBCS — SIGNIFICANT CHANGE UP (ref 0–0)
NT-PROBNP SERPL-SCNC: 76 PG/ML — SIGNIFICANT CHANGE UP (ref 0–125)
NT-PROBNP SERPL-SCNC: 76 PG/ML — SIGNIFICANT CHANGE UP (ref 0–125)
PH UR: 5.5 — SIGNIFICANT CHANGE UP (ref 5–8)
PH UR: 5.5 — SIGNIFICANT CHANGE UP (ref 5–8)
PHOSPHATE SERPL-MCNC: 2.8 MG/DL — SIGNIFICANT CHANGE UP (ref 2.5–4.5)
PHOSPHATE SERPL-MCNC: 2.8 MG/DL — SIGNIFICANT CHANGE UP (ref 2.5–4.5)
PLATELET # BLD AUTO: 319 K/UL — SIGNIFICANT CHANGE UP (ref 150–400)
PLATELET # BLD AUTO: 319 K/UL — SIGNIFICANT CHANGE UP (ref 150–400)
POTASSIUM SERPL-MCNC: 3.2 MMOL/L — LOW (ref 3.5–5.3)
POTASSIUM SERPL-MCNC: 3.2 MMOL/L — LOW (ref 3.5–5.3)
POTASSIUM SERPL-SCNC: 3.2 MMOL/L — LOW (ref 3.5–5.3)
POTASSIUM SERPL-SCNC: 3.2 MMOL/L — LOW (ref 3.5–5.3)
PROT SERPL-MCNC: 7 G/DL — SIGNIFICANT CHANGE UP (ref 6–8.3)
PROT SERPL-MCNC: 7 G/DL — SIGNIFICANT CHANGE UP (ref 6–8.3)
PROT UR-MCNC: NEGATIVE MG/DL — SIGNIFICANT CHANGE UP
PROT UR-MCNC: NEGATIVE MG/DL — SIGNIFICANT CHANGE UP
RAPID RVP RESULT: SIGNIFICANT CHANGE UP
RAPID RVP RESULT: SIGNIFICANT CHANGE UP
RBC # BLD: 4.7 M/UL — SIGNIFICANT CHANGE UP (ref 3.8–5.2)
RBC # BLD: 4.7 M/UL — SIGNIFICANT CHANGE UP (ref 3.8–5.2)
RBC # FLD: 12.2 % — SIGNIFICANT CHANGE UP (ref 10.3–14.5)
RBC # FLD: 12.2 % — SIGNIFICANT CHANGE UP (ref 10.3–14.5)
SARS-COV-2 RNA SPEC QL NAA+PROBE: SIGNIFICANT CHANGE UP
SARS-COV-2 RNA SPEC QL NAA+PROBE: SIGNIFICANT CHANGE UP
SODIUM SERPL-SCNC: 142 MMOL/L — SIGNIFICANT CHANGE UP (ref 135–145)
SODIUM SERPL-SCNC: 142 MMOL/L — SIGNIFICANT CHANGE UP (ref 135–145)
SP GR SPEC: 1.01 — SIGNIFICANT CHANGE UP (ref 1–1.03)
SP GR SPEC: 1.01 — SIGNIFICANT CHANGE UP (ref 1–1.03)
TROPONIN I, HIGH SENSITIVITY RESULT: 4.9 NG/L — SIGNIFICANT CHANGE UP
TROPONIN I, HIGH SENSITIVITY RESULT: 4.9 NG/L — SIGNIFICANT CHANGE UP
TROPONIN I, HIGH SENSITIVITY RESULT: 6.1 NG/L — SIGNIFICANT CHANGE UP
TROPONIN I, HIGH SENSITIVITY RESULT: 6.1 NG/L — SIGNIFICANT CHANGE UP
TSH SERPL-MCNC: 1.06 UIU/ML — SIGNIFICANT CHANGE UP (ref 0.36–3.74)
TSH SERPL-MCNC: 1.06 UIU/ML — SIGNIFICANT CHANGE UP (ref 0.36–3.74)
UROBILINOGEN FLD QL: 0.2 MG/DL — SIGNIFICANT CHANGE UP (ref 0.2–1)
UROBILINOGEN FLD QL: 0.2 MG/DL — SIGNIFICANT CHANGE UP (ref 0.2–1)
WBC # BLD: 8.66 K/UL — SIGNIFICANT CHANGE UP (ref 3.8–10.5)
WBC # BLD: 8.66 K/UL — SIGNIFICANT CHANGE UP (ref 3.8–10.5)
WBC # FLD AUTO: 8.66 K/UL — SIGNIFICANT CHANGE UP (ref 3.8–10.5)
WBC # FLD AUTO: 8.66 K/UL — SIGNIFICANT CHANGE UP (ref 3.8–10.5)

## 2023-12-31 PROCEDURE — 71250 CT THORAX DX C-: CPT | Mod: 26,MA

## 2023-12-31 PROCEDURE — 99285 EMERGENCY DEPT VISIT HI MDM: CPT

## 2023-12-31 PROCEDURE — 71045 X-RAY EXAM CHEST 1 VIEW: CPT | Mod: 26

## 2023-12-31 PROCEDURE — 99223 1ST HOSP IP/OBS HIGH 75: CPT | Mod: GC,AI

## 2023-12-31 RX ORDER — OMEPRAZOLE 10 MG/1
1 CAPSULE, DELAYED RELEASE ORAL
Refills: 0 | DISCHARGE

## 2023-12-31 RX ORDER — ACETAMINOPHEN 500 MG
650 TABLET ORAL EVERY 6 HOURS
Refills: 0 | Status: DISCONTINUED | OUTPATIENT
Start: 2023-12-31 | End: 2024-01-01

## 2023-12-31 RX ORDER — FLUDROCORTISONE ACETATE 0.1 MG/1
0.5 TABLET ORAL
Refills: 0 | DISCHARGE

## 2023-12-31 RX ORDER — HYDROCORTISONE 20 MG
1 TABLET ORAL
Refills: 0 | DISCHARGE

## 2023-12-31 RX ORDER — POTASSIUM CHLORIDE 20 MEQ
40 PACKET (EA) ORAL ONCE
Refills: 0 | Status: COMPLETED | OUTPATIENT
Start: 2023-12-31 | End: 2023-12-31

## 2023-12-31 RX ORDER — HYDROCORTISONE 20 MG
20 TABLET ORAL DAILY
Refills: 0 | Status: DISCONTINUED | OUTPATIENT
Start: 2024-01-01 | End: 2024-01-01

## 2023-12-31 RX ORDER — PANTOPRAZOLE SODIUM 20 MG/1
40 TABLET, DELAYED RELEASE ORAL
Refills: 0 | Status: DISCONTINUED | OUTPATIENT
Start: 2023-12-31 | End: 2024-01-01

## 2023-12-31 RX ORDER — HYDROCORTISONE 20 MG
10 TABLET ORAL AT BEDTIME
Refills: 0 | Status: DISCONTINUED | OUTPATIENT
Start: 2023-12-31 | End: 2024-01-01

## 2023-12-31 RX ORDER — FLUDROCORTISONE ACETATE 0.1 MG/1
0.05 TABLET ORAL DAILY
Refills: 0 | Status: DISCONTINUED | OUTPATIENT
Start: 2023-12-31 | End: 2024-01-01

## 2023-12-31 RX ORDER — METOPROLOL TARTRATE 50 MG
25 TABLET ORAL THREE TIMES A DAY
Refills: 0 | Status: DISCONTINUED | OUTPATIENT
Start: 2023-12-31 | End: 2024-01-01

## 2023-12-31 RX ORDER — METOPROLOL TARTRATE 50 MG
100 TABLET ORAL DAILY
Refills: 0 | Status: DISCONTINUED | OUTPATIENT
Start: 2023-12-31 | End: 2023-12-31

## 2023-12-31 RX ORDER — ENOXAPARIN SODIUM 100 MG/ML
40 INJECTION SUBCUTANEOUS EVERY 24 HOURS
Refills: 0 | Status: DISCONTINUED | OUTPATIENT
Start: 2023-12-31 | End: 2024-01-01

## 2023-12-31 RX ADMIN — Medication 40 MILLIEQUIVALENT(S): at 11:37

## 2023-12-31 RX ADMIN — ENOXAPARIN SODIUM 40 MILLIGRAM(S): 100 INJECTION SUBCUTANEOUS at 16:39

## 2023-12-31 RX ADMIN — Medication 40 MILLIGRAM(S): at 09:55

## 2023-12-31 RX ADMIN — Medication 40 MILLIEQUIVALENT(S): at 16:39

## 2023-12-31 RX ADMIN — Medication 25 MILLIGRAM(S): at 23:36

## 2023-12-31 NOTE — ED ADULT NURSE NOTE - NSFALLUNIVINTERV_ED_ALL_ED
Bed/Stretcher in lowest position, wheels locked, appropriate side rails in place/Call bell, personal items and telephone in reach/Instruct patient to call for assistance before getting out of bed/chair/stretcher/Non-slip footwear applied when patient is off stretcher/[place] to call system/Physically safe environment - no spills, clutter or unnecessary equipment/Purposeful proactive rounding/Room/bathroom lighting operational, light cord in reach Bed/Stretcher in lowest position, wheels locked, appropriate side rails in place/Call bell, personal items and telephone in reach/Instruct patient to call for assistance before getting out of bed/chair/stretcher/Non-slip footwear applied when patient is off stretcher/[place] to call system/Physically safe environment - no spills, clutter or unnecessary equipment/Purposeful proactive rounding/Room/bathroom lighting operational, light cord in reach

## 2023-12-31 NOTE — ED PROVIDER NOTE - CLINICAL SUMMARY MEDICAL DECISION MAKING FREE TEXT BOX
Repeat visit for palpitations shortness of breath.  Check labs, ekg/monitor, will add CT chest this time, will have cardio eval patient here if able.

## 2023-12-31 NOTE — H&P ADULT - PROBLEM SELECTOR PLAN 1
- episodes of palpitations and SOB x 1 week  - vitals stable  - CT chest negative for acute disease  - K 3.2, repleted  - EKG: NSR  - Remote tele: Sinus rate 85 with occasional PVCs, range 70s-80s, no events  - episodes possibly paroxysmal AFib  - continue home bisoprolol 5 mg therapeutic interchange to metoprolol succinate 100 mg daily  - FU RVP  - FU TSH  - continue to monitor on Tele  - FU TTE  - Cardiology Dr. Philip consulted, FU recs - episodes of palpitations and SOB x 1 week  - vitals stable  - CT chest negative for acute disease  - K 3.2, repleted  - EKG: NSR  - Remote tele: Sinus rate 85 with occasional PVCs, range 70s-80s, no events  - episodes possibly paroxysmal AFib  - FU RVP  - FU TSH  - continue to monitor on Tele  - FU TTE  - Cardiology Dr. Philip consulted, FU recs  - per cardio recs will hold home bisoprolol 5 mg daily and start metoprolol tartrate 25 mg Q8 and titrate as needed. - episodes of palpitations and SOB x 1 week  - vitals stable  - CT chest negative for acute disease  - K 3.2, repleted  - Trop neg, FU repeat  - D dimer neg  - TSH wnl on 12/26, FU repeat TSH  - EKG: NSR  - Remote tele: Sinus rate 85 with occasional PVCs, range 70s-80s, no events  - episodes possibly paroxysmal AFib  - FU RVP  - continue to monitor on Tele  - FU TTE  - Cardiology Dr. Philip consulted, FU recs  - per cardio recs will hold home bisoprolol 5 mg daily and start metoprolol tartrate 25 mg Q8 and titrate as needed. - episodes of palpitations and SOB x 1 week  - vitals stable  - CT chest negative for acute disease  - K 3.2, repleted  - Trop neg x2  - D dimer neg  - TSH wnl on 12/26, repeat TSH wnl  - EKG: NSR  - Remote tele: Sinus rate 85 with occasional PVCs, range 70s-80s, no events  - episodes possibly paroxysmal AFib  - FU RVP  - continue to monitor on Tele  - FU TTE  - Cardiology Dr. Philip consulted, FU recs  - per cardio recs will hold home bisoprolol 5 mg daily and start metoprolol tartrate 25 mg Q8 and titrate as needed.

## 2023-12-31 NOTE — ED PROVIDER NOTE - OBJECTIVE STATEMENT
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  Has been ongoing for about a week.  Can occur at rest.  HR and BP elevated at home. Patient reports possible exposure to coxsackievirus.  No chest pain.  Patient saw her cardiologist Dr. Murrieta who put her on a 48-hour monitor which she has not yet returned.  Patient denies anxiety.

## 2023-12-31 NOTE — H&P ADULT - PROBLEM SELECTOR PLAN 7
- CT chest: Nonspecific linear pulmonary density subpleural in the right upper lobe, 3 mm, image 603:[id number]. - CT chest: Nonspecific linear pulmonary density subpleural in the right upper lobe, 3 mm, image 603:52.  -f/u pulm consult

## 2023-12-31 NOTE — H&P ADULT - PROBLEM SELECTOR PLAN 3
- chronic problem  - continue home Hydrocortisone 20 mg in AM and 10 mg in PM  - continue home Fludrocortisone 0.05 mg daily (pt takes half of 0.1 mg pill daily)  - s/p solumedrol 40 mg IV in ED  - continue to monitor vitals - K 3.2, given KCl 40 mg PO x1  - will give another KCl 40 mg PO now  - FU AM CMP

## 2023-12-31 NOTE — CONSULT NOTE ADULT - ASSESSMENT
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  Has been ongoing for about a week.  Can occur at rest.  HR and BP elevated at home. Patient reports possible exposure to coxsackievirus.  No chest pain.  Patient saw her cardiologist Dr. Murrieta who put her on a 48-hour monitor which she has not yet returned.  Patient denies anxiety    1. Palpitation  One documented episode of AF in the past after colonoscopy.  Recurrent palpitations with HR >100, with home Kardia negative for AF and negative 48 hrs HM negative for AF.  Telemetry to assess palpn    2. CP SOB  negative ddimer. neg trop  Echo 10/12/2023 with normal LVEF, unremarkable.  GXT 9/2023 to 10 METS negative for ischemia.    Will follow.  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  Mid November she had one weeks of mild chest pounding, shortness of breath that lasted about 1/2 hour daily, all resolved after one week.  She had no palpitation until about one week ago when similar symptoms occurred but this time with heavier pounding associated with more shortness of breath, weakness and "not well".  Today she came in due to around 8am when she felt more intense pounding and HR up at 130s, and felt very poorly with nausea, weakness, shortness of breath.    1. Palpitation  One documented episode of AF in the past after colonoscopy.  One week of pounding, tachycardia shortness of breath that occurred mid November, recurred more intensely the past week and robbie today.    EKG with sinus rhythm.  Current HR low 100s.    Etiology of sinus tach unclear, DDimer neg, trop neg, TSH normal.   Recommend telemetry.  change bisoprolol 5 mg to metoprolol 25 mg tartrate Q8 and titrate as needed.   Recheck echo    2. CP SOB  negative ddimer. neg trop  GXT 9/2023 to 10 METS negative for ischemia.    Will follow.

## 2023-12-31 NOTE — H&P ADULT - PROBLEM SELECTOR PLAN 2
- K 3.2, given KCl 40 mg PO x1  - will give another KCl 40 mg PO now  - FU AM CMP - hx one episode of AFib after colonoscopy in July 2023  - was on metoprolol, then switched to diltiazem, then switched to bisoprolol  - continue home bisoprolol 5 mg therapeutic interchange to metoprolol succinate 100 mg daily  - monitor on tele  - FU Cardio recs - hx one episode of AFib after colonoscopy in July 2023  - was on metoprolol, then switched to diltiazem, then switched to bisoprolol 5 mg daily  - monitor on tele  - per cardio recs will hold home bisoprolol 5 mg daily and start metoprolol tartrate 25 mg Q8 and titrate as needed.

## 2023-12-31 NOTE — ED PROVIDER NOTE - PROGRESS NOTE DETAILS
Case discussed with Dr. Philip, recommending admission for echo and monitoring.  She believes it is paroxysmal A-fib however would not change meds until this was confirmed.

## 2023-12-31 NOTE — H&P ADULT - NSICDXPASTMEDICALHX_GEN_ALL_CORE_FT
PAST MEDICAL HISTORY:  Harsha disease     Addisons disease     CSF leak     GERD (gastroesophageal reflux disease)     H/O Clostridium difficile infection     Hiatal hernia     History of colonic polyps     Hyperthyroidism     MVP (mitral valve prolapse)     Thyroid disease

## 2023-12-31 NOTE — H&P ADULT - PROBLEM SELECTOR PLAN 5
- left 5th metatarsal fracture on Oct 24, currently in boot - chronic problem, pt was previously hypothyroid on Synthroid, then became hyperthyroid with Grave's disease and was on  methimazole x 2 years and weaned off.  - not currently on medication  - TSH 2.24 on 12/26  - FU TSH

## 2023-12-31 NOTE — H&P ADULT - NSHPPHYSICALEXAM_GEN_ALL_CORE
Vital Signs Last 24 Hrs  T(C): 36.8 (31 Dec 2023 08:30), Max: 36.8 (31 Dec 2023 08:30)  T(F): 98.2 (31 Dec 2023 08:30), Max: 98.2 (31 Dec 2023 08:30)  HR: 84 (31 Dec 2023 09:20) (84 - 90)  BP: 172/80 (31 Dec 2023 09:20) (154/101 - 172/80)  RR: 22 (31 Dec 2023 09:20) (18 - 22)  SpO2: 98% (31 Dec 2023 09:20) (98% - 100%)    O2 Parameters below as of 31 Dec 2023 09:20  Patient On (Oxygen Delivery Method): room air      CONSTITUTIONAL: awake, alert, no acute distress  HEENT: Atraumatic normocephalic. Moist mucous membranes.  No conjunctival injection.  RESP: No respiratory distress; CTA b/l, no WRR  CV: RRR, +S1S2, no MRG  GI: Soft, nontender, nondistended, no rebound or guarding  MSK: + boot on left foot. Moving all four extremities spontaneously. No peripheral edema. No calf tenderness.  SKIN: Warm and dry. No rashes noted.  NEURO: AOx3, answering questions and following commands appropriately  PSYCH: Mood and affect appropriate, recent memory intact

## 2023-12-31 NOTE — H&P ADULT - NSHPREVIEWOFSYSTEMS_GEN_ALL_CORE
REVIEW OF SYSTEMS:  CONSTITUTIONAL: No fever, chills or fatigue.  HENMT: No HA, dizziness, rhinorrhea  RESPIRATORY: No cough or shortness of breath.  CARDIOVASCULAR: No chest pain, palpitations.  GASTROINTESTINAL: No abdominal pain. No nausea or vomiting; No diarrhea or constipation. No blood per rectum.  GENITOURINARY: No dysuria, changes in frequency, hematuria, or incontinence  NEUROLOGICAL: Baseline strength. Sensation intact bilaterally.  SKIN: No itching, rashes  MUSCULOSKELETAL: No joint pain or swelling; No muscle, back, or extremity pain REVIEW OF SYSTEMS:  CONSTITUTIONAL: No fever, chills or fatigue.  HENMT: No HA, dizziness, rhinorrhea  RESPIRATORY: + SOB. No cough  CARDIOVASCULAR: + palpitations. No chest pain  GASTROINTESTINAL: + nausea. No abdominal pain. No vomiting; No diarrhea or constipation. No blood per rectum.  GENITOURINARY: No dysuria, changes in frequency, hematuria, or incontinence  NEUROLOGICAL: Baseline strength. Sensation intact bilaterally.  SKIN: No itching, rashes  MUSCULOSKELETAL: + left morelos fx.  No muscle, back, or extremity pain

## 2023-12-31 NOTE — CONSULT NOTE ADULT - SUBJECTIVE AND OBJECTIVE BOX
CARDIOLOGY CONSULT NOTE    Patient is a 68y Female with a known history of :  Addisons disease     GERD (gastroesophageal reflux disease)     Hiatal hernia     History of colonic polyps     Hyperthyroidism     MVP (mitral valve prolapse)     Thyroid disease.       HPI:  68-year-old female medical history of Fountain's disease and recent CSF leak, 1 episode of A-fib after colonoscopy here again for episodes of shortness of breath with pounding in her chest and nausea and burping.  Has been ongoing for about a week.  Can occur at rest.  HR and BP elevated at home. Patient reports possible exposure to coxsackievirus.  No chest pain.  Patient saw her cardiologist Dr. Murrieta who put her on a 48-hour monitor which she has not yet returned.  Patient denies anxiety        REVIEW OF SYSTEMS:    CONSTITUTIONAL: No fever, weight loss, or fatigue  EYES: No eye pain, visual disturbances, or discharge  ENMT:  No difficulty hearing, tinnitus, vertigo; No sinus or throat pain  NECK: No pain or stiffness  BREASTS: No pain, masses, or nipple discharge  RESPIRATORY: No cough, wheezing, chills or hemoptysis; No shortness of breath  CARDIOVASCULAR: No chest pain, palpitations, dizziness, or leg swelling  GASTROINTESTINAL: No abdominal or epigastric pain. No nausea, vomiting, or hematemesis; No diarrhea or constipation. No melena or hematochezia.  GENITOURINARY: No dysuria, frequency, hematuria, or incontinence  NEUROLOGICAL: No headaches, memory loss, loss of strength, numbness, or tremors  SKIN: No itching, burning, rashes, or lesions   LYMPH NODES: No enlarged glands  ENDOCRINE: No heat or cold intolerance; No hair loss  MUSCULOSKELETAL: No joint pain or swelling; No muscle, back, or extremity pain  PSYCHIATRIC: No depression, anxiety, mood swings, or difficulty sleeping  HEME/LYMPH: No easy bruising, or bleeding gums  ALLERGY AND IMMUNOLOGIC: No hives or eczema    MEDICATIONS  (STANDING):    HOME MEDS:  metoprolol tartrate 25 mg oral tablet: 1 tab(s) orally every 8 hours  · 	acetaminophen-oxyCODONE 325 mg-5 mg oral tablet: 1 tab(s) orally every 4 hours, As needed, Moderate Pain  · 	hydrocortisone 20 mg oral tablet: 1 tab(s) orally once a day  · 	hydrocortisone 20 mg oral tablet: 1 tab(s) orally once a day  · 	fludrocortisone 0.1 mg oral tablet: 1 tab(s) orally once a day  · 	fludrocortisone 0.1 mg oral tablet: 1 tab(s) orally once a day  · 	pantoprazole 40 mg oral delayed release tablet: 1 tab(s) orally 2 times a day (before meals)  · 	hydrocortisone 10 mg oral tablet: 1 orally once a day (at bedtime)  · 	Protonix 40 mg oral granule, enteric coated: 1 each orally once a day  · 	hydrocortisone 20 mg oral tablet: 1 orally once a day (in the morning)  · 	Synthroid 50 mcg (0.05 mg) oral tablet: 1 tab(s) orally once a day  · 	Axid: 150 milligram(s) orally once a day  · 	omeprazole 20 mg oral delayed release tablet: 1 orally once a day  · 	fludrocortisone 0.1 mg oral tablet: 0.5 tab(s) orally once a day (in the morning)        ALLERGIES: IV Contrast (Hives)  NSAIDs (Hives)  IV Contrast (Swelling)      FAMILY HISTORY:  FH: colon cancer (Grandparent)        PHYSICAL EXAMINATION:  -----------------------------  T(C): 36.8 (12-31-23 @ 08:30), Max: 36.8 (12-31-23 @ 08:30)  HR: 84 (12-31-23 @ 09:20) (84 - 90)  BP: 172/80 (12-31-23 @ 09:20) (154/101 - 172/80)  RR: 22 (12-31-23 @ 09:20) (18 - 22)  SpO2: 98% (12-31-23 @ 09:20) (98% - 100%)  Wt(kg): --    Height (cm): 165.1 (12-31 @ 08:30)  Weight (kg): 68 (12-31 @ 08:30)  BMI (kg/m2): 24.9 (12-31 @ 08:30)  BSA (m2): 1.75 (12-31 @ 08:30)    Constitutional: well developed, normal appearance, well groomed, well nourished, no deformities and no acute distress.   Eyes: the conjunctiva exhibited no abnormalities and the eyelids demonstrated no xanthelasmas.   HEENT: normal oral mucosa, no oral pallor and no oral cyanosis.   Neck: normal jugular venous A waves present, normal jugular venous V waves present and no jugular venous ruggiero A waves.   Pulmonary: no respiratory distress, normal respiratory rhythm and effort, no accessory muscle use and lungs were clear to auscultation bilaterally.   Cardiovascular: heart rate and rhythm were normal, normal S1 and S2 and no murmur, gallop, rub, heave or thrill are present.   Abdomen: soft, non-tender, no hepato-splenomegaly and no abdominal mass palpated.   Musculoskeletal: the gait could not be assessed..   Extremities: no clubbing of the fingernails, no localized cyanosis, no petechial hemorrhages and no ischemic changes.   Skin: normal skin color and pigmentation, no rash, no venous stasis, no skin lesions, no skin ulcer and no xanthoma was observed.   Psychiatric: oriented to person, place, and time, the affect was normal, the mood was normal and not feeling anxious.     LABS:   --------  12-31    142  |  108  |  16  ----------------------------<  73  3.2<L>   |  28  |  0.90    Ca    8.9      31 Dec 2023 09:30  Mg     2.0     12-31    TPro  7.0  /  Alb  3.4  /  TBili  0.3  /  DBili  x   /  AST  19  /  ALT  23  /  AlkPhos  59  12-31                         14.7   8.66  )-----------( 319      ( 31 Dec 2023 09:30 )             43.6                 RADIOLOGY:  -----------------        ECG:  CARDIOLOGY CONSULT NOTE    Patient is a 68y Female with a known history of :  Addisons disease     GERD (gastroesophageal reflux disease)     Hiatal hernia     History of colonic polyps     Hyperthyroidism     MVP (mitral valve prolapse)     Thyroid disease.       HPI:  68-year-old female medical history of Van Buren's disease and recent CSF leak, 1 episode of A-fib after colonoscopy here again for episodes of shortness of breath with pounding in her chest and nausea and burping.  Has been ongoing for about a week.  Can occur at rest.  HR and BP elevated at home. Patient reports possible exposure to coxsackievirus.  No chest pain.  Patient saw her cardiologist Dr. Murrieta who put her on a 48-hour monitor which she has not yet returned.  Patient denies anxiety        REVIEW OF SYSTEMS:    CONSTITUTIONAL: No fever, weight loss, or fatigue  EYES: No eye pain, visual disturbances, or discharge  ENMT:  No difficulty hearing, tinnitus, vertigo; No sinus or throat pain  NECK: No pain or stiffness  BREASTS: No pain, masses, or nipple discharge  RESPIRATORY: No cough, wheezing, chills or hemoptysis; No shortness of breath  CARDIOVASCULAR: No chest pain, palpitations, dizziness, or leg swelling  GASTROINTESTINAL: No abdominal or epigastric pain. No nausea, vomiting, or hematemesis; No diarrhea or constipation. No melena or hematochezia.  GENITOURINARY: No dysuria, frequency, hematuria, or incontinence  NEUROLOGICAL: No headaches, memory loss, loss of strength, numbness, or tremors  SKIN: No itching, burning, rashes, or lesions   LYMPH NODES: No enlarged glands  ENDOCRINE: No heat or cold intolerance; No hair loss  MUSCULOSKELETAL: No joint pain or swelling; No muscle, back, or extremity pain  PSYCHIATRIC: No depression, anxiety, mood swings, or difficulty sleeping  HEME/LYMPH: No easy bruising, or bleeding gums  ALLERGY AND IMMUNOLOGIC: No hives or eczema    MEDICATIONS  (STANDING):    HOME MEDS:  metoprolol tartrate 25 mg oral tablet: 1 tab(s) orally every 8 hours  · 	acetaminophen-oxyCODONE 325 mg-5 mg oral tablet: 1 tab(s) orally every 4 hours, As needed, Moderate Pain  · 	hydrocortisone 20 mg oral tablet: 1 tab(s) orally once a day  · 	hydrocortisone 20 mg oral tablet: 1 tab(s) orally once a day  · 	fludrocortisone 0.1 mg oral tablet: 1 tab(s) orally once a day  · 	fludrocortisone 0.1 mg oral tablet: 1 tab(s) orally once a day  · 	pantoprazole 40 mg oral delayed release tablet: 1 tab(s) orally 2 times a day (before meals)  · 	hydrocortisone 10 mg oral tablet: 1 orally once a day (at bedtime)  · 	Protonix 40 mg oral granule, enteric coated: 1 each orally once a day  · 	hydrocortisone 20 mg oral tablet: 1 orally once a day (in the morning)  · 	Synthroid 50 mcg (0.05 mg) oral tablet: 1 tab(s) orally once a day  · 	Axid: 150 milligram(s) orally once a day  · 	omeprazole 20 mg oral delayed release tablet: 1 orally once a day  · 	fludrocortisone 0.1 mg oral tablet: 0.5 tab(s) orally once a day (in the morning)        ALLERGIES: IV Contrast (Hives)  NSAIDs (Hives)  IV Contrast (Swelling)      FAMILY HISTORY:  FH: colon cancer (Grandparent)        PHYSICAL EXAMINATION:  -----------------------------  T(C): 36.8 (12-31-23 @ 08:30), Max: 36.8 (12-31-23 @ 08:30)  HR: 84 (12-31-23 @ 09:20) (84 - 90)  BP: 172/80 (12-31-23 @ 09:20) (154/101 - 172/80)  RR: 22 (12-31-23 @ 09:20) (18 - 22)  SpO2: 98% (12-31-23 @ 09:20) (98% - 100%)  Wt(kg): --    Height (cm): 165.1 (12-31 @ 08:30)  Weight (kg): 68 (12-31 @ 08:30)  BMI (kg/m2): 24.9 (12-31 @ 08:30)  BSA (m2): 1.75 (12-31 @ 08:30)    Constitutional: well developed, normal appearance, well groomed, well nourished, no deformities and no acute distress.   Eyes: the conjunctiva exhibited no abnormalities and the eyelids demonstrated no xanthelasmas.   HEENT: normal oral mucosa, no oral pallor and no oral cyanosis.   Neck: normal jugular venous A waves present, normal jugular venous V waves present and no jugular venous ruggiero A waves.   Pulmonary: no respiratory distress, normal respiratory rhythm and effort, no accessory muscle use and lungs were clear to auscultation bilaterally.   Cardiovascular: heart rate and rhythm were normal, normal S1 and S2 and no murmur, gallop, rub, heave or thrill are present.   Abdomen: soft, non-tender, no hepato-splenomegaly and no abdominal mass palpated.   Musculoskeletal: the gait could not be assessed..   Extremities: no clubbing of the fingernails, no localized cyanosis, no petechial hemorrhages and no ischemic changes.   Skin: normal skin color and pigmentation, no rash, no venous stasis, no skin lesions, no skin ulcer and no xanthoma was observed.   Psychiatric: oriented to person, place, and time, the affect was normal, the mood was normal and not feeling anxious.     LABS:   --------  12-31    142  |  108  |  16  ----------------------------<  73  3.2<L>   |  28  |  0.90    Ca    8.9      31 Dec 2023 09:30  Mg     2.0     12-31    TPro  7.0  /  Alb  3.4  /  TBili  0.3  /  DBili  x   /  AST  19  /  ALT  23  /  AlkPhos  59  12-31                         14.7   8.66  )-----------( 319      ( 31 Dec 2023 09:30 )             43.6                 RADIOLOGY:  -----------------        ECG:

## 2023-12-31 NOTE — H&P ADULT - HISTORY OF PRESENT ILLNESS
68-year-old female medical history of Harsha's disease, hypothyroidism and then Graves disease (was on methimazole in the past), recent CSF leak s/p blood patches, 1 episode of A-fib after colonoscopy on Bisoprolol, presents with episodes of shortness of breath with palpitations and nausea x 1 week. She states she feels a pounding in her chest. These episodes occur randomly at rest, last approx 1 hour, and then resolve. She can have mulitple episodes in a day.  She was recently here in [place] ED 12/26 for this complaint. She saw her cardiologist last week for this complaint, had a 48 hour holter monitor placed, but she has not returned it, and she states that during the 2 days she wore it, she did not have any episodes. Denies CP, HA, dizziness, abd pain, fever, rash.  She is currently feeling better, but still has pounding sensation in her chest. denies nausea or SOB.  Patient reports possible exposure to coxsackievirus from her granddaughter 2 weeks ago.  She has Jimenez fracture of left 5th metatarsal from Oct 24, currently has boot, it has partially healed and she has a bone stimulator at home to help it heal.    ED COURSE:  Vitals: T  98.2 F , HR 90 ,  /101 , RR 18 , SpO2  100% on RA  Labs significant for: K 3.2 Troponin 6.1, BNP 76, D dimer neg  Imaging: CT chest: no acute findings   CXR: negative  EKG: NSR  Pt received: KCl 40 mg PO, Solumedrol 40 mg IV   68-year-old female medical history of Harsha's disease, hypothyroidism and then Graves disease (was on methimazole in the past), recent CSF leak s/p blood patches, 1 episode of A-fib after colonoscopy on Bisoprolol, presents with episodes of shortness of breath with palpitations and nausea x 1 week. She states she feels a pounding in her chest. These episodes occur randomly at rest, last approx 1 hour, and then resolve. She can have mulitple episodes in a day.  She was recently here in [place] ED 12/26 for this complaint. She saw her cardiologist last week for this complaint, had a 48 hour holter monitor placed, but she has not returned it, and she states that during the 2 days she wore it, she did not have any episodes. Denies CP, HA, dizziness, abd pain, fever, rash.  She is currently feeling better, but still has pounding sensation in her chest. denies nausea or SOB.  Patient reports possible exposure to coxsackievirus from her granddaughter 2 weeks ago.  She has Jimenez fracture of left 5th metatarsal from Oct 24, currently has boot, it has partially healed and she has a bone stimulator at home to help it heal.    ED COURSE:  Vitals: T  98.2 F , HR 90 ,  /101 , RR 18 , SpO2  100% on RA  Labs significant for: K 3.2 Troponin 6.1, BNP 76, D dimer neg  Imaging: CT chest: no acute findings   CXR: negative  EKG: NSR  Pt received: KCl 40 mg PO, Solumedrol 40 mg IV   68-year-old female medical history of Harsha's disease, hypothyroidism and then Graves disease (was on methimazole in the past), recent CSF leak s/p blood patches, 1 episode of A-fib after colonoscopy on Bisoprolol, C diff, presents with episodes of shortness of breath with palpitations and nausea x 1 week. She states she feels a pounding in her chest. These episodes occur randomly at rest, last approx 1 hour, and then resolve. She can have mulitple episodes in a day.  She was recently here in [place] ED 12/26 for this complaint. She saw her cardiologist last week for this complaint, had a 48 hour holter monitor placed, but she has not returned it, and she states that during the 2 days she wore it, she did not have any episodes. Denies CP, HA, dizziness, abd pain, fever, rash.  Last weekend she thought that maybe her Bisoprolol was causing her nausea, so she started taking 1/2 pill x 3 days. The symptoms did not change, so she resumed her normal dose. In the past she was on metoprolol succinate for AFib, which was discontinued for HA, and then she was on Cardizem, which was also discontinued for HA.  She is currently feeling better, but still has pounding sensation in her chest. denies nausea or SOB.  Patient reports possible exposure to coxsackievirus from her granddaughter 2 weeks ago.  She has Jimenez fracture of left 5th metatarsal from Oct 24, currently has boot, it has partially healed and she has a bone stimulator at home to help it heal. This has no pain, and she is not on pain medications.    ED COURSE:  Vitals: T  98.2 F , HR 90 ,  /101 , RR 18 , SpO2  100% on RA  Labs significant for: K 3.2 Troponin 6.1, BNP 76, D dimer neg  Imaging: CT chest: no acute findings   CXR: negative  EKG: NSR  Pt received: KCl 40 mg PO, Solumedrol 40 mg IV   68-year-old female medical history of Harsha's disease, hypothyroidism and then Graves disease (was on methimazole in the past), recent CSF leak s/p blood patches, 1 episode of A-fib after colonoscopy on Bisoprolol, C diff, presents with episodes of shortness of breath with palpitations and nausea x 1 week. She states she feels a pounding in her chest. These episodes occur randomly at rest, last approx 1 hour, and then resolve. She can have mulitple episodes in a day.  She was recently here in [place] ED 12/26 for this complaint. She saw her cardiologist last week for this complaint, had a 48 hour holter monitor placed, but she has not returned it, and she states that during the 2 days she wore it, she did not have any episodes. Denies CP, HA, dizziness, abd pain, fever, rash.  Last weekend she thought that maybe her Bisoprolol was causing her nausea, so she started taking 1/2 pill x 3 days. The symptoms did not change, so she resumed her normal dose. In the past she was on metoprolol succinate for AFib, which was discontinued for HA, and then she was on Cardizem, which was also discontinued for HA.  She is currently feeling better, but still has pounding sensation in her chest. denies nausea or SOB.  Patient reports possible exposure to coxsackievirus from her granddaughter 2 weeks ago.  She has Jimenez fracture of left 5th metatarsal from Oct 24, currently has boot, it has partially healed and she has a bone stimulator at home to help it heal. This has no pain, and she is not on pain medications.    ED COURSE:  Vitals: T  98.2 F , HR 90 ,  /101 , RR 18 , SpO2  100% on RA  Labs significant for: K 3.2 Troponin 6.1, BNP 76, D dimer neg  Imaging: CT chest: no acute findings   CXR: negative  EKG: NSR  Pt received: KCl 40 mg PO, Solumedrol 40 mg IV   68-year-old female medical history of Harsha's disease, hypothyroidism and then Graves disease (was on methimazole in the past), recent CSF leak s/p blood patches, 1 episode of A-fib after colonoscopy on Bisoprolol, C diff, presents with episodes of shortness of breath with palpitations and nausea x 1 week. She states she feels a pounding in her chest. These episodes occur randomly at rest, last approx 1 hour, and then resolve. She can have mulitple episodes in a day.  She was recently here in [place] ED 12/26 for this complaint. She saw her cardiologist last week for this complaint, had a 48 hour holter monitor placed, but she has not returned it, and she states that during the 2 days she wore it, she did not have any episodes. Denies CP, HA, dizziness, abd pain, fever, rash.  Last weekend she thought that maybe her Bisoprolol was causing her nausea, so she started taking 1/2 pill x 3 days. The symptoms did not change, so she resumed her normal dose. In the past she was on metoprolol succinate for AFib, which was discontinued for HA, and then she was on Cardizem, which was also discontinued for HA.  She is currently feeling better, but still has pounding sensation in her chest. denies nausea or SOB.  Patient reports possible exposure to coxsackievirus from her granddaughter 2 weeks ago.  She has Jimenez fracture of left 5th metatarsal from Oct 24, currently has boot, it has partially healed and she has a bone stimulator at home to help it heal. This has no pain, and she is not on pain medications.    ED COURSE:  Vitals: T  98.2 F , HR 90 ,  /101 , RR 18 , SpO2  100% on RA  Labs significant for: K 3.2 Troponin neg x2, BNP 76, D dimer neg  Imaging: CT chest: no acute findings   CXR: negative  EKG: NSR  Pt received: KCl 40 mg PO, Solumedrol 40 mg IV   68-year-old female medical history of Harsha's disease, hypothyroidism and then Graves disease (was on methimazole in the past), recent CSF leak s/p blood patches, 1 episode of A-fib after colonoscopy on Bisoprolol, C diff, presents with episodes of shortness of breath with palpitations and nausea x 1 week. She states she feels a pounding in her chest. These episodes occur randomly at rest, last approx 1 hour, and then resolve. She can have mulitple episodes in a day.  She was recently here in [place] ED 12/26 for this complaint. She saw her cardiologist last week for this complaint, had a 48 hour holter monitor placed, but she has not returned it, and she states that during the 2 days she wore it, she did not have any episodes. Denies CP, HA, dizziness, abd pain, fever, rash.  Last weekend she thought that maybe her Bisoprolol was causing her nausea, so she started taking 1/2 pill x 3 days. The symptoms did not change, so she resumed her normal dose. In the past she was on metoprolol succinate for AFib, which was discontinued for HA, and then she was on Cardizem, which was also discontinued for HA.  She is currently feeling better, but still has pounding sensation in her chest. denies nausea or SOB.  Patient reports possible exposure to coxsackievirus from her granddaughter 2 weeks ago.  She has Jimenez fracture of left 5th metatarsal from Oct 24, currently has boot, it has partially healed and she has a bone stimulator at home to help it heal. This has no pain, and she is not on pain medications.    ED COURSE:  Vitals: T  98.2 F , HR 90 ,  /101 , RR 18 , SpO2  100% on RA  Labs significant for: K 3.2 Troponin neg x2, BNP 76, D dimer neg  Imaging: CT chest: no acute findings   CXR: negative  EKG: NSR  Pt received: KCl 40 mg PO, Solumedrol 40 mg IV

## 2023-12-31 NOTE — H&P ADULT - NSHPOUTPATIENTPROVIDERS_GEN_ALL_CORE
PCP Dr. Chairez  Cardio: Dr. Murrieta ([facility]), did see Dr. Goodwin once outpt this year  Neuro: Dr. Stallings ([facility]) PCP Dr. Chairez  Cardio: Dr. Murrieta ([facility]), did see Dr. Goodwin once outpt this year  Neuro: Dr. Stallings ([facility])

## 2023-12-31 NOTE — H&P ADULT - NSHPSOCIALHISTORY_GEN_ALL_CORE
Tobacco: Denies, former smoker, quit 15 years ago. was smoking 2-3 cigarettes per day x 30 years  EtOH: Denies   Recreational drug use: Denies  Lives with: alone  Ambulates: unassisted   ADLs: able to cook, clean, and shop independently

## 2023-12-31 NOTE — H&P ADULT - ATTENDING COMMENTS
68-year-old female medical history of Harsha's disease, hypothyroidism and then Graves disease (was on methimazole in the past), recent CSF leak s/p blood patches, 1 episode of A-fib after colonoscopy on Bisoprolol, C diff, presents with episodes of shortness of breath with palpitations and nausea x 1 week. Admitted for further evaluation of palpitations.    HPI as above.     Plan:  Palpitations: started on lopressor 25mg q8h.   -monitor on tele  -pulm consult due to abnormality seen on CT chest for further recs. Patient informed of finding, f/u pulm recs    remainder as above

## 2023-12-31 NOTE — ED ADULT TRIAGE NOTE - NS ED NURSE AMBULANCES
[facility] [Mother] : mother [Yes] : Patient goes to dentist yearly [Toothpaste] : Primary Fluoride Source: Toothpaste [Needs Immunizations] : needs immunizations [Premenarche] : premenarche [Mother's age at onset of menses: ____] : Mother's age at onset of menses: [unfilled] [Eats meals with family] : eats meals with family [Has family members/adults to turn to for help] : has family members/adults to turn to for help [Grade: ____] : Grade: [unfilled] [Normal Performance] : normal performance [Eats regular meals including adequate fruits and vegetables] : eats regular meals including adequate fruits and vegetables [Has friends] : has friends [At least 1 hour of physical activity a day] : at least 1 hour of physical activity a day [Uses electronic nicotine delivery system] : does not use electronic nicotine delivery system [Uses tobacco] : does not use tobacco [Uses drugs] : does not use drugs  [Drinks alcohol] : does not drink alcohol [No] : No cigarette smoke exposure [Uses safety belts/safety equipment] : uses safety belts/safety equipment  [Has ways to cope with stress] : has ways to cope with stress [Displays self-confidence] : displays self-confidence [Has thought about hurting self or considered suicide] : has not thought about hurting self or considered suicide [With Teen] : teen [With Parent/Guardian] : parent/guardian [FreeTextEntry7] : PREVIOUS PT OF THIS OFFICE, NOT SEEN SINCE PRIOR TO THE PANDEMIC.  IN URGENT CARE A FEW TIMES FOR MINOR SYMPTOMS NO HOSPITALIZATIONS [de-identified] : EXCLUDED FROM SCHOOL FOR DTAP STATUS [de-identified] : [employer] [FreeTextEntry1] : BHX: FT NO COMPLICATIONS  7LBS 11 OZ  NO COMPLICATIONS \par HOSP:NONE\par SX:NONE\par MEDHX: PNEUMA AT AGE 7 YEARS, IG A DEFICIENCY   \par MEDS:NONE \par ALL:NONE\par IMM:NONE \par DEVELOPMENT;APPROPRIATE

## 2023-12-31 NOTE — H&P ADULT - ASSESSMENT
68-year-old female medical history of Harsha's disease, hypothyroidism and then Graves disease (was on methimazole in the past), recent CSF leak s/p blood patches, 1 episode of A-fib after colonoscopy on Bisoprolol, C diff, presents with episodes of shortness of breath with palpitations and nausea x 1 week. Admitted for further evaluation of palpitations.

## 2023-12-31 NOTE — H&P ADULT - PROBLEM SELECTOR PLAN 4
- chronic problem, pt was previously hypothyroid on Synthroid, then became hyperthyroid with Grave's disease and was on  methimazole x 2 years and weaned off.  - not currently on medication  - TSH 2.24 on 12/26  - FU TSH - chronic problem  - continue home Hydrocortisone 20 mg in AM and 10 mg in PM  - continue home Fludrocortisone 0.05 mg daily (pt takes half of 0.1 mg pill daily)  - s/p solumedrol 40 mg IV in ED  - continue to monitor vitals

## 2023-12-31 NOTE — H&P ADULT - PROBLEM SELECTOR PLAN 6
- CT chest: Nonspecific linear pulmonary density subpleural in the right upper lobe, 3 mm, image 603:52. - left 5th metatarsal fracture on Oct 24, currently in boot - left 5th metatarsal fracture on Oct 24, currently in boot  - pt declines pain medication at this time  - tylenol prn for pain

## 2023-12-31 NOTE — ED ADULT NURSE NOTE - OBJECTIVE STATEMENT
Pt arrived to ED by EMS c/o palpitations w/ some SOB started this AM w/ HTN and tachycardia measured at home. Reports HR was 130's and BP was 180's. Pt took her home beta blocker prior to calling EMS and now reports relief from palpitations. Currently only complaint is nausea, denies vomiting/diarrhea. HR on bedside tele is now 80's, BP still elevated to 170's. Does not appear SOB at this time. Pt placed on bedside monitor to cycle BP and monitor rhythm. Has sig hx of Roslyn's disease. Saw Cards last week and was placed on 48 hr monitoring. Labs sent, will continue to monitor. Pt arrived to ED by EMS c/o palpitations w/ some SOB started this AM w/ HTN and tachycardia measured at home. Reports HR was 130's and BP was 180's. Pt took her home beta blocker prior to calling EMS and now reports relief from palpitations. Currently only complaint is nausea, denies vomiting/diarrhea. HR on bedside tele is now 80's, BP still elevated to 170's. Does not appear SOB at this time. Pt placed on bedside monitor to cycle BP and monitor rhythm. Has sig hx of [place]'s disease. Saw Cards last week and was placed on 48 hr monitoring. Labs sent, will continue to monitor.

## 2024-01-01 ENCOUNTER — TRANSCRIPTION ENCOUNTER (OUTPATIENT)
Age: 69
End: 2024-01-01

## 2024-01-01 VITALS
DIASTOLIC BLOOD PRESSURE: 65 MMHG | TEMPERATURE: 99 F | RESPIRATION RATE: 17 BRPM | HEART RATE: 82 BPM | SYSTOLIC BLOOD PRESSURE: 130 MMHG | OXYGEN SATURATION: 98 %

## 2024-01-01 LAB
ALBUMIN SERPL ELPH-MCNC: 3.4 G/DL — SIGNIFICANT CHANGE UP (ref 3.3–5)
ALBUMIN SERPL ELPH-MCNC: 3.4 G/DL — SIGNIFICANT CHANGE UP (ref 3.3–5)
ALP SERPL-CCNC: 59 U/L — SIGNIFICANT CHANGE UP (ref 40–120)
ALP SERPL-CCNC: 59 U/L — SIGNIFICANT CHANGE UP (ref 40–120)
ALT FLD-CCNC: 24 U/L — SIGNIFICANT CHANGE UP (ref 12–78)
ALT FLD-CCNC: 24 U/L — SIGNIFICANT CHANGE UP (ref 12–78)
ANION GAP SERPL CALC-SCNC: 6 MMOL/L — SIGNIFICANT CHANGE UP (ref 5–17)
ANION GAP SERPL CALC-SCNC: 6 MMOL/L — SIGNIFICANT CHANGE UP (ref 5–17)
AST SERPL-CCNC: 13 U/L — LOW (ref 15–37)
AST SERPL-CCNC: 13 U/L — LOW (ref 15–37)
BASOPHILS # BLD AUTO: 0.07 K/UL — SIGNIFICANT CHANGE UP (ref 0–0.2)
BASOPHILS # BLD AUTO: 0.07 K/UL — SIGNIFICANT CHANGE UP (ref 0–0.2)
BASOPHILS NFR BLD AUTO: 0.7 % — SIGNIFICANT CHANGE UP (ref 0–2)
BASOPHILS NFR BLD AUTO: 0.7 % — SIGNIFICANT CHANGE UP (ref 0–2)
BILIRUB SERPL-MCNC: 0.3 MG/DL — SIGNIFICANT CHANGE UP (ref 0.2–1.2)
BILIRUB SERPL-MCNC: 0.3 MG/DL — SIGNIFICANT CHANGE UP (ref 0.2–1.2)
BUN SERPL-MCNC: 15 MG/DL — SIGNIFICANT CHANGE UP (ref 7–23)
BUN SERPL-MCNC: 15 MG/DL — SIGNIFICANT CHANGE UP (ref 7–23)
CALCIUM SERPL-MCNC: 9 MG/DL — SIGNIFICANT CHANGE UP (ref 8.5–10.1)
CALCIUM SERPL-MCNC: 9 MG/DL — SIGNIFICANT CHANGE UP (ref 8.5–10.1)
CHLORIDE SERPL-SCNC: 106 MMOL/L — SIGNIFICANT CHANGE UP (ref 96–108)
CHLORIDE SERPL-SCNC: 106 MMOL/L — SIGNIFICANT CHANGE UP (ref 96–108)
CO2 SERPL-SCNC: 28 MMOL/L — SIGNIFICANT CHANGE UP (ref 22–31)
CO2 SERPL-SCNC: 28 MMOL/L — SIGNIFICANT CHANGE UP (ref 22–31)
CREAT SERPL-MCNC: 0.89 MG/DL — SIGNIFICANT CHANGE UP (ref 0.5–1.3)
CREAT SERPL-MCNC: 0.89 MG/DL — SIGNIFICANT CHANGE UP (ref 0.5–1.3)
EGFR: 71 ML/MIN/1.73M2 — SIGNIFICANT CHANGE UP
EGFR: 71 ML/MIN/1.73M2 — SIGNIFICANT CHANGE UP
EOSINOPHIL # BLD AUTO: 0.06 K/UL — SIGNIFICANT CHANGE UP (ref 0–0.5)
EOSINOPHIL # BLD AUTO: 0.06 K/UL — SIGNIFICANT CHANGE UP (ref 0–0.5)
EOSINOPHIL NFR BLD AUTO: 0.6 % — SIGNIFICANT CHANGE UP (ref 0–6)
EOSINOPHIL NFR BLD AUTO: 0.6 % — SIGNIFICANT CHANGE UP (ref 0–6)
GLUCOSE SERPL-MCNC: 107 MG/DL — HIGH (ref 70–99)
GLUCOSE SERPL-MCNC: 107 MG/DL — HIGH (ref 70–99)
HCT VFR BLD CALC: 41.8 % — SIGNIFICANT CHANGE UP (ref 34.5–45)
HCT VFR BLD CALC: 41.8 % — SIGNIFICANT CHANGE UP (ref 34.5–45)
HGB BLD-MCNC: 14 G/DL — SIGNIFICANT CHANGE UP (ref 11.5–15.5)
HGB BLD-MCNC: 14 G/DL — SIGNIFICANT CHANGE UP (ref 11.5–15.5)
IMM GRANULOCYTES NFR BLD AUTO: 0.7 % — SIGNIFICANT CHANGE UP (ref 0–0.9)
IMM GRANULOCYTES NFR BLD AUTO: 0.7 % — SIGNIFICANT CHANGE UP (ref 0–0.9)
LYMPHOCYTES # BLD AUTO: 1.88 K/UL — SIGNIFICANT CHANGE UP (ref 1–3.3)
LYMPHOCYTES # BLD AUTO: 1.88 K/UL — SIGNIFICANT CHANGE UP (ref 1–3.3)
LYMPHOCYTES # BLD AUTO: 18.2 % — SIGNIFICANT CHANGE UP (ref 13–44)
LYMPHOCYTES # BLD AUTO: 18.2 % — SIGNIFICANT CHANGE UP (ref 13–44)
MCHC RBC-ENTMCNC: 31.5 PG — SIGNIFICANT CHANGE UP (ref 27–34)
MCHC RBC-ENTMCNC: 31.5 PG — SIGNIFICANT CHANGE UP (ref 27–34)
MCHC RBC-ENTMCNC: 33.5 GM/DL — SIGNIFICANT CHANGE UP (ref 32–36)
MCHC RBC-ENTMCNC: 33.5 GM/DL — SIGNIFICANT CHANGE UP (ref 32–36)
MCV RBC AUTO: 93.9 FL — SIGNIFICANT CHANGE UP (ref 80–100)
MCV RBC AUTO: 93.9 FL — SIGNIFICANT CHANGE UP (ref 80–100)
MONOCYTES # BLD AUTO: 0.54 K/UL — SIGNIFICANT CHANGE UP (ref 0–0.9)
MONOCYTES # BLD AUTO: 0.54 K/UL — SIGNIFICANT CHANGE UP (ref 0–0.9)
MONOCYTES NFR BLD AUTO: 5.2 % — SIGNIFICANT CHANGE UP (ref 2–14)
MONOCYTES NFR BLD AUTO: 5.2 % — SIGNIFICANT CHANGE UP (ref 2–14)
NEUTROPHILS # BLD AUTO: 7.72 K/UL — HIGH (ref 1.8–7.4)
NEUTROPHILS # BLD AUTO: 7.72 K/UL — HIGH (ref 1.8–7.4)
NEUTROPHILS NFR BLD AUTO: 74.6 % — SIGNIFICANT CHANGE UP (ref 43–77)
NEUTROPHILS NFR BLD AUTO: 74.6 % — SIGNIFICANT CHANGE UP (ref 43–77)
NRBC # BLD: 0 /100 WBCS — SIGNIFICANT CHANGE UP (ref 0–0)
NRBC # BLD: 0 /100 WBCS — SIGNIFICANT CHANGE UP (ref 0–0)
PLATELET # BLD AUTO: 326 K/UL — SIGNIFICANT CHANGE UP (ref 150–400)
PLATELET # BLD AUTO: 326 K/UL — SIGNIFICANT CHANGE UP (ref 150–400)
POTASSIUM SERPL-MCNC: 3.5 MMOL/L — SIGNIFICANT CHANGE UP (ref 3.5–5.3)
POTASSIUM SERPL-MCNC: 3.5 MMOL/L — SIGNIFICANT CHANGE UP (ref 3.5–5.3)
POTASSIUM SERPL-SCNC: 3.5 MMOL/L — SIGNIFICANT CHANGE UP (ref 3.5–5.3)
POTASSIUM SERPL-SCNC: 3.5 MMOL/L — SIGNIFICANT CHANGE UP (ref 3.5–5.3)
PROT SERPL-MCNC: 6.9 G/DL — SIGNIFICANT CHANGE UP (ref 6–8.3)
PROT SERPL-MCNC: 6.9 G/DL — SIGNIFICANT CHANGE UP (ref 6–8.3)
RBC # BLD: 4.45 M/UL — SIGNIFICANT CHANGE UP (ref 3.8–5.2)
RBC # BLD: 4.45 M/UL — SIGNIFICANT CHANGE UP (ref 3.8–5.2)
RBC # FLD: 12.3 % — SIGNIFICANT CHANGE UP (ref 10.3–14.5)
RBC # FLD: 12.3 % — SIGNIFICANT CHANGE UP (ref 10.3–14.5)
SODIUM SERPL-SCNC: 140 MMOL/L — SIGNIFICANT CHANGE UP (ref 135–145)
SODIUM SERPL-SCNC: 140 MMOL/L — SIGNIFICANT CHANGE UP (ref 135–145)
WBC # BLD: 10.34 K/UL — SIGNIFICANT CHANGE UP (ref 3.8–10.5)
WBC # BLD: 10.34 K/UL — SIGNIFICANT CHANGE UP (ref 3.8–10.5)
WBC # FLD AUTO: 10.34 K/UL — SIGNIFICANT CHANGE UP (ref 3.8–10.5)
WBC # FLD AUTO: 10.34 K/UL — SIGNIFICANT CHANGE UP (ref 3.8–10.5)

## 2024-01-01 PROCEDURE — 36415 COLL VENOUS BLD VENIPUNCTURE: CPT

## 2024-01-01 PROCEDURE — 84443 ASSAY THYROID STIM HORMONE: CPT

## 2024-01-01 PROCEDURE — 85025 COMPLETE CBC W/AUTO DIFF WBC: CPT

## 2024-01-01 PROCEDURE — 93005 ELECTROCARDIOGRAM TRACING: CPT

## 2024-01-01 PROCEDURE — 83735 ASSAY OF MAGNESIUM: CPT

## 2024-01-01 PROCEDURE — 99239 HOSP IP/OBS DSCHRG MGMT >30: CPT

## 2024-01-01 PROCEDURE — 0225U NFCT DS DNA&RNA 21 SARSCOV2: CPT

## 2024-01-01 PROCEDURE — 80053 COMPREHEN METABOLIC PANEL: CPT

## 2024-01-01 PROCEDURE — 84100 ASSAY OF PHOSPHORUS: CPT

## 2024-01-01 PROCEDURE — 99285 EMERGENCY DEPT VISIT HI MDM: CPT

## 2024-01-01 PROCEDURE — 71045 X-RAY EXAM CHEST 1 VIEW: CPT

## 2024-01-01 PROCEDURE — 96374 THER/PROPH/DIAG INJ IV PUSH: CPT

## 2024-01-01 PROCEDURE — 83880 ASSAY OF NATRIURETIC PEPTIDE: CPT

## 2024-01-01 PROCEDURE — 84484 ASSAY OF TROPONIN QUANT: CPT

## 2024-01-01 PROCEDURE — 81003 URINALYSIS AUTO W/O SCOPE: CPT

## 2024-01-01 PROCEDURE — 93306 TTE W/DOPPLER COMPLETE: CPT

## 2024-01-01 PROCEDURE — 71250 CT THORAX DX C-: CPT | Mod: MA

## 2024-01-01 PROCEDURE — 85379 FIBRIN DEGRADATION QUANT: CPT

## 2024-01-01 RX ORDER — METOPROLOL TARTRATE 50 MG
1 TABLET ORAL
Qty: 30 | Refills: 0
Start: 2024-01-01

## 2024-01-01 RX ORDER — BISOPROLOL FUMARATE 10 MG/1
1 TABLET, FILM COATED ORAL
Refills: 0 | DISCHARGE

## 2024-01-01 RX ADMIN — PANTOPRAZOLE SODIUM 40 MILLIGRAM(S): 20 TABLET, DELAYED RELEASE ORAL at 07:30

## 2024-01-01 RX ADMIN — Medication 25 MILLIGRAM(S): at 07:34

## 2024-01-01 RX ADMIN — FLUDROCORTISONE ACETATE 0.05 MILLIGRAM(S): 0.1 TABLET ORAL at 07:37

## 2024-01-01 RX ADMIN — Medication 20 MILLIGRAM(S): at 07:34

## 2024-01-01 NOTE — PROGRESS NOTE ADULT - ASSESSMENT
68-year-old female medical history of Harsha's disease, hypothyroidism and then Graves disease (was on methimazole in the past), recent CSF leak s/p blood patches, 1 episode of A-fib after colonoscopy on Bisoprolol, C diff, presents with episodes of shortness of breath with palpitations and nausea x 1 week. Admitted for further evaluation of palpitations.      Problem/Plan - 1   ·  Problem: Palpitations.   ·  Plan: - episodes of palpitations and SOB x 1 week  - vitals stable  - CT chest negative for acute disease  - K 3.2, repleted  - Trop neg x2  - D dimer neg  - TSH wnl on 12/26, repeat TSH wnl  - EKG: NSR  - Remote tele: Sinus rate 85 with occasional PVCs, range 70s-80s, no events  - episodes possibly paroxysmal AFib  - FU RVP and SARS-CoV-2 negative  - continue to monitor on Tele  - FU TTE  - Cardiology Dr. Philip consulted, FU recs  - per cardio recs will hold home bisoprolol 5 mg daily and started on metoprolol tartrate 25 mg Q8 and titrate as needed.    Problem/Plan - 2   ·  Problem: Paroxysmal atrial fibrillation.   ·  Plan: - hx one episode of AFib after colonoscopy in July 2023  - was on metoprolol, then switched to diltiazem, then switched to bisoprolol 5 mg daily  - monitor on tele  - per cardio recs will hold home bisoprolol 5 mg daily and started on metoprolol tartrate 25 mg Q8 and titrate as needed.    Problem/Plan - 3   ·  Problem: Hypokalemia.   ·  Plan: - K 3.2, given KCl 40 mg PO x1  - will give another KCl 40 mg PO now  - FU AM CMP.    Problem/Plan - 4   ·  Problem: Addisons disease.   ·  Plan: - chronic problem  - continue home Hydrocortisone 20 mg in AM and 10 mg in PM  - continue home Fludrocortisone 0.05 mg daily (pt takes half of 0.1 mg pill daily)  - s/p solumedrol 40 mg IV in ED  - continue to monitor vitals.  - endocrine consult    Problem/Plan - 5   ·  Problem: Graves disease.   ·  Plan: - chronic problem, pt was previously hypothyroid on Synthroid, then became hyperthyroid with Grave's disease and was on  methimazole x 2 years and weaned off.  - not currently on medication  - TSH 2.24 on 12/26  - FU TSH wnl.    Problem/Plan - 6   ·  Problem: Jimenez fracture.   ·  Plan: - left 5th metatarsal fracture on Oct 24, currently in boot  - pt declines pain medication at this time  - tylenol prn for pain.    Problem/Plan - 7   ·  Problem: Imaging abnormality.   ·  Plan: - CT chest: Nonspecific linear pulmonary density subpleural in the right upper lobe, 3 mm, image 603:52.  -f/u pulm consult.    Problem/Plan - 8   ·  Problem: Need for prophylactic measure.   ·  Plan: - Lovenox 40 mg daily      # Coxsackie virus exposure  - exposed to granddaughter 2 weeks ago.

## 2024-01-01 NOTE — PROGRESS NOTE ADULT - SUBJECTIVE AND OBJECTIVE BOX
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 She states she feels a pounding in her chest. These episodes occur randomly at rest, last approx 1 hour, and then resolve. She can have mulitple episodes in a day.  She was recently here in [place] ED 12/26 for this complaint. She saw her cardiologist last week for this complaint, had a 48 hour holter monitor placed, but she has not returned it, and she states that during the 2 days she wore it, she did not have any episodes. Denies CP, HA, dizziness, abd pain, fever, rash.  Last weekend she thought that maybe her Bisoprolol was causing her nausea, so she started taking 1/2 pill x 3 days. The symptoms did not change, so she resumed her normal dose. In the past she was on metoprolol succinate for AFib, which was discontinued for HA, and then she was on Cardizem, which was also discontinued for HA.  She is currently feeling better, but still has pounding sensation in her chest. denies nausea or SOB.  Patient reports possible exposure to coxsackievirus from her granddaughter 2 weeks ago.  She has Jimenez fracture of left 5th metatarsal from Oct 24, currently has boot, it has partially healed and she has a bone stimulator at home to help it heal. This has no pain, and she is not on pain medications.    ED COURSE:  Vitals: T  98.2 F , HR 90 ,  /101 , RR 18 , SpO2  100% on RA  Labs significant for: K 3.2 Troponin neg x2, BNP 76, D dimer neg  Imaging: CT chest: no acute findings   CXR: negative  EKG: NSR  Pt received: KCl 40 mg PO, Solumedrol 40 mg IV   (31 Dec 2023 14:53)      REVIEW OF SYSTEMS:  CONSTITUTIONAL: No fever, weight loss, or fatigue  NECK: No pain or stiffness  RESPIRATORY: No cough, wheezing, chills or hemoptysis; No shortness of breath  CARDIOVASCULAR: HPI  GASTROINTESTINAL: No abdominal or epigastric pain.  No melena or hematochezia.  GENITOURINARY: No dysuria, frequency, hematuria, or incontinence  NEUROLOGICAL: No headaches, memory loss, loss of strength, numbness, or tremors  SKIN: No itching, burning, rashes, or lesions   MUSCULOSKELETAL: No joint pain or swelling; No muscle, back, or extremity pain  PSYCHIATRIC: No depression, anxiety, mood swings, or difficulty sleeping  HEME/LYMPH: No easy bruising, or bleeding gums  ALLERGY AND IMMUNOLOGIC: No hives or eczema      MEDICATIONS  (STANDING):  enoxaparin Injectable 40 milliGRAM(s) SubCutaneous every 24 hours  fludroCORTISONE 0.05 milliGRAM(s) Oral daily  hydrocortisone 20 milliGRAM(s) Oral daily  hydrocortisone 10 milliGRAM(s) Oral at bedtime  metoprolol tartrate 25 milliGRAM(s) Oral three times a day  pantoprazole    Tablet 40 milliGRAM(s) Oral before breakfast    MEDICATIONS  (PRN):  acetaminophen     Tablet .. 650 milliGRAM(s) Oral every 6 hours PRN Mild Pain (1 - 3)      ALLERGIES: IV Contrast (Hives)  NSAIDs (Hives)  IV Contrast (Swelling)      FAMILY HISTORY:  FH: colon cancer (Grandparent)        PHYSICAL EXAMINATION:  -----------------------------  T(C): 36.8 (01-01-24 @ 11:42), Max: 37.1 (12-31-23 @ 16:49)  HR: 83 (01-01-24 @ 11:42) (68 - 92)  BP: 131/61 (01-01-24 @ 11:42) (129/62 - 138/67)  RR: 16 (01-01-24 @ 11:42) (16 - 20)  SpO2: 100% (01-01-24 @ 11:42) (94% - 100%)  Wt(kg): --      Constitutional: well developed,  no acute distress.   Neck: normal jugular venous    Pulmonary: no respiratory distress,  lungs were clear to auscultation bilaterally.   Cardiovascular: normal S1 and S2 and no murmur  Abdomen: soft, non-tender  Musculoskeletal: the gait could not be assessed..   Extremities: no cedema  Skin: normal skin color and pigmentation, no rash, no venous stasis, no skin lesions, no skin ulcer and no xanthoma was observed.   Psychiatric:  , the mood was normal and not feeling anxious.       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 She states she feels a pounding in her chest. These episodes occur randomly at rest, last approx 1 hour, and then resolve. She can have mulitple episodes in a day.  She was recently here in [place] ED 12/26 for this complaint. She saw her cardiologist last week for this complaint, had a 48 hour holter monitor placed, but she has not returned it, and she states that during the 2 days she wore it, she did not have any episodes. Denies CP, HA, dizziness, abd pain, fever, rash.  Last weekend she thought that maybe her Bisoprolol was causing her nausea, so she started taking 1/2 pill x 3 days. The symptoms did not change, so she resumed her normal dose. In the past she was on metoprolol succinate for AFib, which was discontinued for HA, and then she was on Cardizem, which was also discontinued for HA.  She is currently feeling better, but still has pounding sensation in her chest. denies nausea or SOB.  Patient reports possible exposure to coxsackievirus from her granddaughter 2 weeks ago.  She has Jimenez fracture of left 5th metatarsal from Oct 24, currently has boot, it has partially healed and she has a bone stimulator at home to help it heal. This has no pain, and she is not on pain medications.    ED COURSE:  Vitals: T  98.2 F , HR 90 ,  /101 , RR 18 , SpO2  100% on RA  Labs significant for: K 3.2 Troponin neg x2, BNP 76, D dimer neg  Imaging: CT chest: no acute findings   CXR: negative  EKG: NSR  Pt received: KCl 40 mg PO, Solumedrol 40 mg IV   (31 Dec 2023 14:53)      REVIEW OF SYSTEMS:  CONSTITUTIONAL: No fever, weight loss, or fatigue  NECK: No pain or stiffness  RESPIRATORY: No cough, wheezing, chills or hemoptysis; No shortness of breath  CARDIOVASCULAR: HPI  GASTROINTESTINAL: No abdominal or epigastric pain.  No melena or hematochezia.  GENITOURINARY: No dysuria, frequency, hematuria, or incontinence  NEUROLOGICAL: No headaches, memory loss, loss of strength, numbness, or tremors  SKIN: No itching, burning, rashes, or lesions   MUSCULOSKELETAL: No joint pain or swelling; No muscle, back, or extremity pain  PSYCHIATRIC: No depression, anxiety, mood swings, or difficulty sleeping  HEME/LYMPH: No easy bruising, or bleeding gums  ALLERGY AND IMMUNOLOGIC: No hives or eczema      MEDICATIONS  (STANDING):  enoxaparin Injectable 40 milliGRAM(s) SubCutaneous every 24 hours  fludroCORTISONE 0.05 milliGRAM(s) Oral daily  hydrocortisone 20 milliGRAM(s) Oral daily  hydrocortisone 10 milliGRAM(s) Oral at bedtime  metoprolol tartrate 25 milliGRAM(s) Oral three times a day  pantoprazole    Tablet 40 milliGRAM(s) Oral before breakfast    MEDICATIONS  (PRN):  acetaminophen     Tablet .. 650 milliGRAM(s) Oral every 6 hours PRN Mild Pain (1 - 3)      ALLERGIES: IV Contrast (Hives)  NSAIDs (Hives)  IV Contrast (Swelling)      FAMILY HISTORY:  FH: colon cancer (Grandparent)        PHYSICAL EXAMINATION:  -----------------------------  T(C): 36.8 (01-01-24 @ 11:42), Max: 37.1 (12-31-23 @ 16:49)  HR: 83 (01-01-24 @ 11:42) (68 - 92)  BP: 131/61 (01-01-24 @ 11:42) (129/62 - 138/67)  RR: 16 (01-01-24 @ 11:42) (16 - 20)  SpO2: 100% (01-01-24 @ 11:42) (94% - 100%)  Wt(kg): --      Constitutional: well developed,  no acute distress.   Neck: normal jugular venous    Pulmonary: no respiratory distress,  lungs were clear to auscultation bilaterally.   Cardiovascular: normal S1 and S2 and no murmur  Abdomen: soft, non-tender  Musculoskeletal: the gait could not be assessed..   Extremities: no cedema  Skin: normal skin color and pigmentation, no rash, no venous stasis, no skin lesions, no skin ulcer and no xanthoma was observed.   Psychiatric:  , the mood was normal and not feeling anxious.       LABS:   --------  01-01    140  |  106  |  15  ----------------------------<  107<H>  3.5   |  28  |  0.89    Ca    9.0      01 Jan 2024 10:00  Phos  2.8     12-31  Mg     2.0     12-31    TPro  6.9  /  Alb  3.4  /  TBili  0.3  /  DBili  x   /  AST  13<L>  /  ALT  24  /  AlkPhos  59  01-01                         14.0   10.34 )-----------( 326      ( 01 Jan 2024 10:00 )             41.8

## 2024-01-01 NOTE — PROGRESS NOTE ADULT - SUBJECTIVE AND OBJECTIVE BOX
CHIEF COMPLAINT/INTERVAL HISTORY:  Pt. seen and evaluated for palpitations.  Pt. is in no distress.  Started on metoprolol last night.  Denies having any sensations of palpitations or SOB this morning.      REVIEW OF SYSTEMS:  No fever, CP, SOB, or abdominal pain.      Vital Signs Last 24 Hrs  T(C): 37.1 (31 Dec 2023 16:49), Max: 37.1 (31 Dec 2023 16:49)  T(F): 98.7 (31 Dec 2023 16:49), Max: 98.7 (31 Dec 2023 16:49)  HR: 92 (31 Dec 2023 16:49) (84 - 92)  BP: 129/62 (31 Dec 2023 16:49) (129/62 - 172/80)  BP(mean): --  RR: 20 (31 Dec 2023 16:49) (18 - 22)  SpO2: 94% (31 Dec 2023 16:49) (94% - 100%)    Parameters below as of 31 Dec 2023 09:20  Patient On (Oxygen Delivery Method): room air        PHYSICAL EXAM:  GENERAL: NAD  HEENT: EOMI, hearing normal, conjunctiva and sclera clear  Chest: CTA bilaterally, no wheezing  CV: S1S2, RRR,   GI: soft, +BS, NT/ND  Musculoskeletal: no LE edema  Psychiatric: affect nL, mood nL  Skin: warm and dry    LABS:                        14.7   8.66  )-----------( 319      ( 31 Dec 2023 09:30 )             43.6     12-31    142  |  108  |  16  ----------------------------<  73  3.2<L>   |  28  |  0.90    Ca    8.9      31 Dec 2023 09:30  Phos  2.8     12-31  Mg     2.0     12-    TPro  7.0  /  Alb  3.4  /  TBili  0.3  /  DBili  x   /  AST  19  /  ALT  23  /  AlkPhos  59  12-31      Urinalysis Basic - ( 31 Dec 2023 10:40 )    Color: Yellow / Appearance: Clear / S.010 / pH: x  Gluc: x / Ketone: Negative mg/dL  / Bili: Negative / Urobili: 0.2 mg/dL   Blood: x / Protein: Negative mg/dL / Nitrite: Negative   Leuk Esterase: Negative / RBC: x / WBC x   Sq Epi: x / Non Sq Epi: x / Bacteria: x

## 2024-01-01 NOTE — DISCHARGE NOTE PROVIDER - HOSPITAL COURSE
68-year-old female medical history of Harsha's disease, hypothyroidism and then Graves disease (was on methimazole in the past), recent CSF leak s/p blood patches, 1 episode of A-fib after colonoscopy on Bisoprolol, C diff, presents with episodes of shortness of breath with palpitations and nausea x 1 week. She states she feels a pounding in her chest. These episodes occur randomly at rest, last approx 1 hour, and then resolve. She can have mulitple episodes in a day.  She was recently here in [place] ED 12/26 for this complaint. She saw her cardiologist last week for this complaint, had a 48 hour holter monitor placed, but she has not returned it, and she states that during the 2 days she wore it, she did not have any episodes. Denies CP, HA, dizziness, abd pain, fever, rash.  Last weekend she thought that maybe her Bisoprolol was causing her nausea, so she started taking 1/2 pill x 3 days. The symptoms did not change, so she resumed her normal dose. In the past she was on metoprolol succinate for AFib, which was discontinued for HA, and then she was on Cardizem, which was also discontinued for HA.  She is currently feeling better, but still has pounding sensation in her chest. denies nausea or SOB.  Patient reports possible exposure to coxsackievirus from her granddaughter 2 weeks ago.  She has Jimenez fracture of left 5th metatarsal from Oct 24, currently has boot, it has partially healed and she has a bone stimulator at home to help it heal. This has no pain, and she is not on pain medications.    ED COURSE:  Vitals: T  98.2 F , HR 90 ,  /101 , RR 18 , SpO2  100% on RA  Labs significant for: K 3.2 Troponin neg x2, BNP 76, D dimer neg  Imaging: CT chest: no acute findings   CXR: negative  EKG: NSR  Pt received: KCl 40 mg PO, Solumedrol 40 mg IV    Pt. was admitted with cardiology consultation.  She was placed on metoprolol.  Palpitations resolved.  TSH was wnl.  RVP and SARS-CoV-2 negative.      Echocardiogram:   1. Technically difficult image quality.   2. Left ventricular systolic function is normal with an ejection fraction visually estimated at 60 to 65 %.   3. Normal right ventricular cavity size, wall thickness, and systolic function.   4. The left atrium is normal.   5. Trace mitral regurgitation.   6. There is mild thickening of the aortic valve leaflets.   7. Aortic valve was not well visualized.    Palpitations have resolved.     68-year-old female medical history of Harsha's disease, hypothyroidism and then Graves disease (was on methimazole in the past), recent CSF leak s/p blood patches, 1 episode of A-fib after colonoscopy on Bisoprolol, C diff, presents with episodes of shortness of breath with palpitations and nausea x 1 week. She states she feels a pounding in her chest. These episodes occur randomly at rest, last approx 1 hour, and then resolve. She can have mulitple episodes in a day.  She was recently here in [place] ED 12/26 for this complaint. She saw her cardiologist last week for this complaint, had a 48 hour holter monitor placed, but she has not returned it, and she states that during the 2 days she wore it, she did not have any episodes. Denies CP, HA, dizziness, abd pain, fever, rash.  Last weekend she thought that maybe her Bisoprolol was causing her nausea, so she started taking 1/2 pill x 3 days. The symptoms did not change, so she resumed her normal dose. In the past she was on metoprolol succinate for AFib, which was discontinued for HA, and then she was on Cardizem, which was also discontinued for HA.  She is currently feeling better, but still has pounding sensation in her chest. denies nausea or SOB.  Patient reports possible exposure to coxsackievirus from her granddaughter 2 weeks ago.  She has Jimenez fracture of left 5th metatarsal from Oct 24, currently has boot, it has partially healed and she has a bone stimulator at home to help it heal. This has no pain, and she is not on pain medications.    ED COURSE:  Vitals: T  98.2 F , HR 90 ,  /101 , RR 18 , SpO2  100% on RA  Labs significant for: K 3.2 Troponin neg x2, BNP 76, D dimer neg  Imaging: CT chest: no acute findings   CXR: negative  EKG: NSR  Pt received: KCl 40 mg PO, Solumedrol 40 mg IV    Pt. was admitted with cardiology consultation.  She was placed on metoprolol.  Palpitations resolved.  TSH was wnl.  RVP and SARS-CoV-2 negative.      Echocardiogram:   1. Technically difficult image quality.   2. Left ventricular systolic function is normal with an ejection fraction visually estimated at 60 to 65 %.   3. Normal right ventricular cavity size, wall thickness, and systolic function.   4. The left atrium is normal.   5. Trace mitral regurgitation.   6. There is mild thickening of the aortic valve leaflets.   7. Aortic valve was not well visualized.    Palpitations have resolved.     68-year-old female medical history of Harsha's disease, hypothyroidism and then Graves disease (was on methimazole in the past), recent CSF leak s/p blood patches, 1 episode of A-fib after colonoscopy on Bisoprolol, C diff, presents with episodes of shortness of breath with palpitations and nausea x 1 week. She states she feels a pounding in her chest. These episodes occur randomly at rest, last approx 1 hour, and then resolve. She can have mulitple episodes in a day.  She was recently here in [place] ED 12/26 for this complaint. She saw her cardiologist last week for this complaint, had a 48 hour holter monitor placed, but she has not returned it, and she states that during the 2 days she wore it, she did not have any episodes. Denies CP, HA, dizziness, abd pain, fever, rash.  Last weekend she thought that maybe her Bisoprolol was causing her nausea, so she started taking 1/2 pill x 3 days. The symptoms did not change, so she resumed her normal dose. In the past she was on metoprolol succinate for AFib, which was discontinued for HA, and then she was on Cardizem, which was also discontinued for HA.  She is currently feeling better, but still has pounding sensation in her chest. denies nausea or SOB.  Patient reports possible exposure to coxsackievirus from her granddaughter 2 weeks ago.  She has Jimenez fracture of left 5th metatarsal from Oct 24, currently has boot, it has partially healed and she has a bone stimulator at home to help it heal. This has no pain, and she is not on pain medications.    ED COURSE:  Vitals: T  98.2 F , HR 90 ,  /101 , RR 18 , SpO2  100% on RA  Labs significant for: K 3.2 Troponin neg x2, BNP 76, D dimer neg  Imaging: CT chest: no acute findings   CXR: negative  EKG: NSR  Pt received: KCl 40 mg PO, Solumedrol 40 mg IV    Pt. was admitted with cardiology consultation.  She was placed on metoprolol.  Palpitations resolved.  TSH was wnl.  RVP and SARS-CoV-2 negative.      Echocardiogram:   1. Technically difficult image quality.   2. Left ventricular systolic function is normal with an ejection fraction visually estimated at 60 to 65 %.   3. Normal right ventricular cavity size, wall thickness, and systolic function.   4. The left atrium is normal.   5. Trace mitral regurgitation.   6. There is mild thickening of the aortic valve leaflets.   7. Aortic valve was not well visualized.    Palpitations have resolved.  On day of discharge patient is in no distress.  Feels better.  Afebrile and is hemodynamically stable.     68-year-old female medical history of Harsha's disease, hypothyroidism and then Graves disease (was on methimazole in the past), recent CSF leak s/p blood patches, 1 episode of A-fib after colonoscopy on Bisoprolol, C diff, presents with episodes of shortness of breath with palpitations and nausea x 1 week. She states she feels a pounding in her chest. These episodes occur randomly at rest, last approx 1 hour, and then resolve. She can have mulitple episodes in a day.  She was recently here in [place] ED 12/26 for this complaint. She saw her cardiologist last week for this complaint, had a 48 hour holter monitor placed, but she has not returned it, and she states that during the 2 days she wore it, she did not have any episodes. Denies CP, HA, dizziness, abd pain, fever, rash.  Last weekend she thought that maybe her Bisoprolol was causing her nausea, so she started taking 1/2 pill x 3 days. The symptoms did not change, so she resumed her normal dose. In the past she was on metoprolol succinate for AFib, which was discontinued for HA, and then she was on Cardizem, which was also discontinued for HA.  She is currently feeling better, but still has pounding sensation in her chest. denies nausea or SOB.  Patient reports possible exposure to coxsackievirus from her granddaughter 2 weeks ago.  She has Jimenez fracture of left 5th metatarsal from Oct 24, currently has boot, it has partially healed and she has a bone stimulator at home to help it heal. This has no pain, and she is not on pain medications.    ED COURSE:  Vitals: T  98.2 F , HR 90 ,  /101 , RR 18 , SpO2  100% on RA  Labs significant for: K 3.2 Troponin neg x2, BNP 76, D dimer neg  Imaging: CT chest: no acute findings   CXR: negative  EKG: NSR  Pt received: KCl 40 mg PO, Solumedrol 40 mg IV    Pt. was admitted with cardiology consultation.  She was placed on metoprolol.  Palpitations resolved.  TSH was wnl.  RVP and SARS-CoV-2 negative.      Echocardiogram:   1. Technically difficult image quality.   2. Left ventricular systolic function is normal with an ejection fraction visually estimated at 60 to 65 %.   3. Normal right ventricular cavity size, wall thickness, and systolic function.   4. The left atrium is normal.   5. Trace mitral regurgitation.   6. There is mild thickening of the aortic valve leaflets.   7. Aortic valve was not well visualized.    Palpitations have resolved.  On day of discharge patient is in no distress.  Feels better.  Afebrile and is hemodynamically stable.

## 2024-01-01 NOTE — DISCHARGE NOTE PROVIDER - CARE PROVIDER_API CALL
JENNIFER KUMAR  [address] [place] [address]  Phone: [phone]  Fax: [phone])-  Follow Up Time: 1 week    Zeynep Philip  Cardiovascular Disease  [address] [place] [address]  Phone: [phone]  Fax: [phone]  Follow Up Time: 2 weeks   JENNIFER KUMAR  [address] [place] [address]  Phone: ([phone]  Fax: ()-  Follow Up Time: 1 week    Zeynep Philip  Cardiovascular Disease  [address] [place] [address]  Phone: [phone]  Fax: [phone]  Follow Up Time: 2 weeks

## 2024-01-01 NOTE — DISCHARGE NOTE PROVIDER - NSDCFUSCHEDAPPT_GEN_ALL_CORE_FT
Ludin Zavala  [facility]  [address]ALM [address] [facility]  Scheduled Appointment: 01/08/2024     Ludin Zavala  [facility]  [address]ALM [address] [facility]  Scheduled Appointment: 01/08/2024

## 2024-01-01 NOTE — DISCHARGE NOTE PROVIDER - PROVIDER TOKENS
PROVIDER:[TOKEN:[[id number]],FOLLOWUP:[1 week]],PROVIDER:[TOKEN:[[id number]],FOLLOWUP:[2 weeks]] PROVIDER:[TOKEN:[[id number]],FOLLOWUP:[1 week]],PROVIDER:[TOKEN:[[id number]],FOLLOWUP:[2 weeks]]

## 2024-01-01 NOTE — DISCHARGE NOTE NURSING/CASE MANAGEMENT/SOCIAL WORK - PATIENT PORTAL LINK FT
You can access the FollowMyHealth Patient Portal offered by [facility] by registering at the following website: [email or web address]. By joining [facility]’s FollowMyHealth portal, you will also be able to view your health information using other applications (apps) compatible with our system. You can access the FollowMyHealth Patient Portal offered by [facility] by registering at the following website: [email or web address]. By joining [employer] [facility]’s FollowMyHealth portal, you will also be able to view your health information using other applications (apps) compatible with our system.

## 2024-01-01 NOTE — DISCHARGE NOTE NURSING/CASE MANAGEMENT/SOCIAL WORK - NSDCPEFALRISK_GEN_ALL_CORE
For information on Fall & Injury Prevention, visit: [email or web address] OR  [email or web address]/[email or web address] OR  https://www.cdc.gov/steadi/patient.html For information on Fall & Injury Prevention, visit: [email or web address] OR  [email or web address] OR  https://www.cdc.gov/steadi/patient.html

## 2024-01-01 NOTE — CONSULT NOTE ADULT - CONSULT REQUESTED BY NAME
Dr Agarwal
MD
[facility] Pregnancy And Lactation Text: It is unknown if this medication is safe to use during pregnancy.  It is unknown if this medication is excreted in breast milk.  Breastfeeding women should use the topical cream on the smallest area of the skin for the shortest time needed while breastfeeding.  Do not apply to nipple and areola.

## 2024-01-01 NOTE — DISCHARGE NOTE PROVIDER - NSDCCPCAREPLAN_GEN_ALL_CORE_FT
PRINCIPAL DISCHARGE DIAGNOSIS  Diagnosis: Palpitations  Assessment and Plan of Treatment: Your bisoprolol was discontinued and you were started on metoprolol.  Palpitations have resolved.  Please continue metoprolol ER as prescribed.  Follow up with your PMD in 1 week and Cardiology in 1-2 weeks.      SECONDARY DISCHARGE DIAGNOSES  Diagnosis: Dyspnea  Assessment and Plan of Treatment: Resolved.    Diagnosis: Addisons disease  Assessment and Plan of Treatment: Continue your hydrocortisone and fludrocortisone.    Diagnosis: Imaging abnormality  Assessment and Plan of Treatment: Nonspecific linear pulmonary density subpleural in the right upper lobe, 3 mm found on CT scan.  Follow up with your PMD and arrange for non urgent follow up imaging.

## 2024-01-01 NOTE — PROGRESS NOTE ADULT - ASSESSMENT
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  Mid November she had one weeks of mild chest pounding, shortness of breath that lasted about 1/2 hour daily, all resolved after one week.  She had no palpitation until about one week ago when similar symptoms occurred but this time with heavier pounding associated with more shortness of breath, weakness and "not well".  Today she came in due to around 8am when she felt more intense pounding and HR up at 130s, and felt very poorly with nausea, weakness, shortness of breath.    1. Palpitation  One documented episode of AF in the past after colonoscopy.  One week of pounding, tachycardia shortness of breath that occurred mid November, recurred more intensely the past week and robbie today.    EKG with sinus rhythm.  Current HR low 100-120s.    Etiology of sinus tach unclear, DDimer neg, trop neg, TSH normal.   Telemetry with no arrhythmia noted.    Changed bisoprolol 5 mg to metoprolol 25 mg tartrate Q8.  HR since yesterday at 60-80s s/p 2 doses of metoprolol 25 mg tartrate.   she is feeling improved today. NO pounding, palpitation or shortness of breath.     2. CP SOB  negative ddimer. neg trop  GXT 9/2023 to 10 METS negative for ischemia.  Echo 1/1/2024 with normal EF.      3. HTN  BP spike in recent weeks, 140s/80s with occ spikes to 160s/100.  Likely due to eating out much more often due to foot fx.   BP here now normalizing.     Pt has been in ER since overnight unable to rest due to agitated pt in hallway.  She wants to go home.  Stable cardiac wise for discharge on Metoprolol 50 mg daily.  Follow up with me in office.  [phone] to schedule appt.      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  Mid November she had one weeks of mild chest pounding, shortness of breath that lasted about 1/2 hour daily, all resolved after one week.  She had no palpitation until about one week ago when similar symptoms occurred but this time with heavier pounding associated with more shortness of breath, weakness and "not well".  Today she came in due to around 8am when she felt more intense pounding and HR up at 130s, and felt very poorly with nausea, weakness, shortness of breath.    1. Palpitation  One documented episode of AF in the past after colonoscopy.  One week of pounding, tachycardia shortness of breath that occurred mid November, recurred more intensely the past week and robbie today.    EKG with sinus rhythm.  Current HR low 100-120s.    Etiology of sinus tach unclear, DDimer neg, trop neg, TSH normal.   Telemetry with no arrhythmia noted.    Changed bisoprolol 5 mg to metoprolol 25 mg tartrate Q8.  HR since yesterday at 60-80s s/p 2 doses of metoprolol 25 mg tartrate.   she is feeling improved today. NO pounding, palpitation or shortness of breath.     2. CP SOB  negative ddimer. neg trop  GXT 9/2023 to 10 METS negative for ischemia.  Echo 1/1/2024 with normal EF.      3. HTN  BP spike in recent weeks, 140s/80s with occ spikes to 160s/100.  Likely due to eating out much more often due to foot fx.   BP here now normalizing.     Pt has been in ER since overnight unable to rest due to agitated pt in hallway.  She wants to go home.  Stable cardiac wise for discharge on Metoprolol 50 mg daily.  Follow up with me in office.  [phone] to schedule appt.

## 2024-01-01 NOTE — CONSULT NOTE ADULT - ASSESSMENT
68-year-old female medical history of Harsha's disease, hypothyroidism and then Graves disease (was on methimazole in the past), recent CSF leak s/p blood patches, 1 episode of A-fib after colonoscopy on Bisoprolol, C diff, presents with episodes of shortness of breath with palpitations and nausea x 1 week.    pulm nodule  OP  OA  Hypothyroidism  [place]'s disease  AF  C diff hx    d dimer normal  ct chest - no acute chest path - pulm density - nodule - 3 mm - no acute intervention - non emergent follow up as outpatient  cardio eval  [facility] rx regimen  tele monitoring  BP monitoring  thyroid disease  TTE -    68-year-old female medical history of Harsha's disease, hypothyroidism and then Graves disease (was on methimazole in the past), recent CSF leak s/p blood patches, 1 episode of A-fib after colonoscopy on Bisoprolol, C diff, presents with episodes of shortness of breath with palpitations and nausea x 1 week.    pulm nodule  OP  OA  Hypothyroidism  [place]'s disease  AF  C diff hx    d dimer normal  ct chest - no acute chest path - pulm density - nodule - 3 mm - no acute intervention - non emergent follow up as outpatient  cardio eval  [facility] rx regimen  tele monitoring  BP monitoring  thyroid disease  TTE -

## 2024-01-01 NOTE — ED ADULT NURSE REASSESSMENT NOTE - NS ED NURSE REASSESS COMMENT FT1
Assumed patient care at 0730am, patient is AOx3, remains on cardiac monitor, denies complaints @ this time. Patient remains awaiting bed on the floor. Will continue to monitor.

## 2024-01-01 NOTE — DISCHARGE NOTE PROVIDER - NSDCMRMEDTOKEN_GEN_ALL_CORE_FT
fludrocortisone 0.1 mg oral tablet: 0.5 tab(s) orally once a day  hydrocortisone 10 mg oral tablet: 1 orally once a day (at bedtime)  hydrocortisone 20 mg oral tablet: 1 orally once a day (in the morning)  omeprazole 40 mg oral delayed release capsule: 1 cap(s) orally once a day  Toprol-XL 50 mg oral tablet, extended release: 1 tab(s) orally once a day

## 2024-01-04 ENCOUNTER — APPOINTMENT (OUTPATIENT)
Dept: CARDIOLOGY | Facility: CLINIC | Age: 69
End: 2024-01-04

## 2024-01-08 ENCOUNTER — NON-APPOINTMENT (OUTPATIENT)
Age: 69
End: 2024-01-08

## 2024-01-08 ENCOUNTER — APPOINTMENT (OUTPATIENT)
Dept: OPHTHALMOLOGY | Facility: CLINIC | Age: 69
End: 2024-01-08

## 2024-01-08 ENCOUNTER — APPOINTMENT (OUTPATIENT)
Dept: CARDIOLOGY | Facility: CLINIC | Age: 69
End: 2024-01-08
Payer: MEDICARE

## 2024-01-08 VITALS
WEIGHT: 145 LBS | OXYGEN SATURATION: 98 % | DIASTOLIC BLOOD PRESSURE: 79 MMHG | BODY MASS INDEX: 24.16 KG/M2 | SYSTOLIC BLOOD PRESSURE: 125 MMHG | HEART RATE: 80 BPM | HEIGHT: 65 IN

## 2024-01-08 DIAGNOSIS — R00.2 PALPITATIONS: ICD-10-CM

## 2024-01-08 DIAGNOSIS — I10 ESSENTIAL (PRIMARY) HYPERTENSION: ICD-10-CM

## 2024-01-08 PROBLEM — Z86.19 PERSONAL HISTORY OF OTHER INFECTIOUS AND PARASITIC DISEASES: Chronic | Status: ACTIVE | Noted: 2023-12-31

## 2024-01-08 PROBLEM — G96.00 CEREBROSPINAL FLUID LEAK, UNSPECIFIED: Chronic | Status: ACTIVE | Noted: 2023-12-31

## 2024-01-08 PROCEDURE — 99204 OFFICE O/P NEW MOD 45 MIN: CPT

## 2024-01-08 PROCEDURE — 99214 OFFICE O/P EST MOD 30 MIN: CPT

## 2024-01-08 PROCEDURE — 93000 ELECTROCARDIOGRAM COMPLETE: CPT

## 2024-01-08 RX ORDER — BISOPROLOL FUMARATE 5 MG/1
5 TABLET, FILM COATED ORAL DAILY
Qty: 90 | Refills: 0 | Status: DISCONTINUED | COMMUNITY
Start: 2023-08-22 | End: 2024-01-08

## 2024-01-08 NOTE — HISTORY OF PRESENT ILLNESS
[FreeTextEntry1] : Olesya is a 68 year old female here for evaluation. I initially met her over the summer in 7/2023.  She presented with atrial fibrillation after a colonoscopy.  She saw Maddie in 10/23 as a hospital follow-up.  She had nightmares and headaches on Toprol XL, and it was changed to bisoprolol.  At the same time it was determined that her headaches were from a spontaneous CSF leak, and she is required blood patches, with the most recent one being done 10 days ago.  Recently, she has felt a pounding feeling in the chest with an elevated blood pressure and heart rate over 100.  She saw her cardiologist at [facility], and was given a 2-day monitor.  The symptoms continued, and she eventually went to the ER.  Her bisoprolol was switched back to metoprolol, and she was eventually discharged home.  An echocardiogram demonstrated normal LV function, without significant valvular pathology.  She has not been active, and has been nonweightbearing for 2 months because of her left foot.  Over this past weekend, she did not feel well with palpitations/dyspnea, and some anxiety.  Her metoprolol was now switched to nebivolol.  Because of low blood pressure this was decreased to 5 mg.  Since this time, she is feeling a little better.  Her father did have bypass surgery, though did not take care of himself.  She denies toxic habits, other than being a small smoker back in the day.  She has not had any symptoms to suggest recurrent atrial fibrillation.  She does have a history of Sac's disease, and is currently on hydrocortisone and Florinef.

## 2024-01-08 NOTE — DISCUSSION/SUMMARY
[FreeTextEntry1] : Olesya has not been feeling well as of late, and her symptoms seem to be multifactorial.  She has had headaches, and is dealing with a spontaneous CSF leak, requiring blood patches.  She also reports palpitations and dyspnea, which seem to have some anxiety component.  She is feeling better on nebivolol, rather than metoprolol, which we will continue.  She has not had any symptoms to suggest recurrent atrial fibrillation.  She has been less active because of her foot, and has gained some weight.  She needs to limit her salt intake, as she is on Florinef/hydrocortisone for [place]'s.  I requested a copy of her most recent cardiac records.  She will let me know if any issues.  I will see her again in 3 to 4 months. [EKG obtained to assist in diagnosis and management of assessed problem(s)] : EKG obtained to assist in diagnosis and management of assessed problem(s)

## 2024-01-17 ENCOUNTER — RX RENEWAL (OUTPATIENT)
Age: 69
End: 2024-01-17

## 2024-01-31 ENCOUNTER — APPOINTMENT (OUTPATIENT)
Dept: OTOLARYNGOLOGY | Facility: CLINIC | Age: 69
End: 2024-01-31
Payer: MEDICARE

## 2024-01-31 VITALS
HEART RATE: 78 BPM | BODY MASS INDEX: 24.16 KG/M2 | SYSTOLIC BLOOD PRESSURE: 124 MMHG | WEIGHT: 145 LBS | DIASTOLIC BLOOD PRESSURE: 78 MMHG | HEIGHT: 65 IN

## 2024-01-31 DIAGNOSIS — H61.20 IMPACTED CERUMEN, UNSPECIFIED EAR: ICD-10-CM

## 2024-01-31 DIAGNOSIS — H69.90 UNSPECIFIED EUSTACHIAN TUBE DISORDER, UNSPECIFIED EAR: ICD-10-CM

## 2024-01-31 DIAGNOSIS — J34.2 DEVIATED NASAL SEPTUM: ICD-10-CM

## 2024-01-31 PROCEDURE — 92557 COMPREHENSIVE HEARING TEST: CPT

## 2024-01-31 PROCEDURE — 99213 OFFICE O/P EST LOW 20 MIN: CPT | Mod: 25

## 2024-01-31 PROCEDURE — G0268 REMOVAL OF IMPACTED WAX MD: CPT

## 2024-01-31 PROCEDURE — 92567 TYMPANOMETRY: CPT

## 2024-01-31 RX ORDER — AZELASTINE HYDROCHLORIDE 137 UG/1
137 SPRAY, METERED NASAL TWICE DAILY
Qty: 1 | Refills: 5 | Status: DISCONTINUED | COMMUNITY
Start: 2022-07-12 | End: 2024-01-31

## 2024-01-31 RX ORDER — AZITHROMYCIN 500 MG/1
500 TABLET, FILM COATED ORAL DAILY
Qty: 7 | Refills: 1 | Status: DISCONTINUED | COMMUNITY
Start: 2022-07-12 | End: 2024-01-31

## 2024-01-31 RX ORDER — NEBIVOLOL 5 MG/1
5 TABLET ORAL DAILY
Qty: 90 | Refills: 0 | Status: DISCONTINUED | COMMUNITY
Start: 2024-01-08 | End: 2024-01-31

## 2024-01-31 RX ORDER — FLUTICASONE PROPIONATE 50 UG/1
50 SPRAY, METERED NASAL
Qty: 48 | Refills: 0 | Status: DISCONTINUED | COMMUNITY
Start: 2022-07-12 | End: 2024-01-31

## 2024-01-31 RX ORDER — AZELASTINE HYDROCHLORIDE 137 UG/1
0.1 SPRAY, METERED NASAL
Qty: 90 | Refills: 0 | Status: DISCONTINUED | COMMUNITY
Start: 2022-07-12 | End: 2024-01-31

## 2024-01-31 NOTE — DATA REVIEWED
[de-identified] : Type A tymps AU; abnormal ETF AU AD: normal to moderate SNHL, 250-8000Hz AS: normal mild SNHL, 250-8000 Hz no change since 7/12/2022 REC: ENT f/u, re-eval per MD

## 2024-01-31 NOTE — HISTORY OF PRESENT ILLNESS
[de-identified] : EAR CLOGGED FEELING ON AND OFF MEDICAL HX REVIEWED LOW PRESSURE SPINAL FLUID WITH POSSIBILITY OF CSF LEAK FROM UNKNOWN AREA THAT HAS BEEN TREATED SUCCESSFULY BY NEUROLOGY/ ANESTHESIOLOGY AND INTERVENTIONAL RADIOLOGIST  WITH BLOOD PATCHES SEPTOPLASTY

## 2024-01-31 NOTE — PHYSICAL EXAM
[Normal] : mucosa is normal [Midline] : trachea located in midline position [de-identified] : LEFT IMPACTED CERUMEN REMOVED

## 2024-01-31 NOTE — REASON FOR VISIT
[Subsequent Evaluation] : a subsequent evaluation for [FreeTextEntry2] : sinuses and right ear clogged

## 2024-02-15 ENCOUNTER — APPOINTMENT (OUTPATIENT)
Dept: CARDIOLOGY | Facility: CLINIC | Age: 69
End: 2024-02-15
Payer: MEDICARE

## 2024-02-15 ENCOUNTER — NON-APPOINTMENT (OUTPATIENT)
Age: 69
End: 2024-02-15

## 2024-02-15 VITALS
WEIGHT: 152 LBS | HEIGHT: 65 IN | BODY MASS INDEX: 25.33 KG/M2 | OXYGEN SATURATION: 95 % | DIASTOLIC BLOOD PRESSURE: 76 MMHG | SYSTOLIC BLOOD PRESSURE: 153 MMHG | HEART RATE: 92 BPM

## 2024-02-15 VITALS — DIASTOLIC BLOOD PRESSURE: 82 MMHG | SYSTOLIC BLOOD PRESSURE: 132 MMHG

## 2024-02-15 PROCEDURE — 99214 OFFICE O/P EST MOD 30 MIN: CPT

## 2024-02-15 PROCEDURE — 93000 ELECTROCARDIOGRAM COMPLETE: CPT

## 2024-02-15 NOTE — DISCUSSION/SUMMARY
[FreeTextEntry1] : Olesya had not been feeling well as of late, and her symptoms seem to be multifactorial.  She has had headaches, and is dealing with a spontaneous CSF leak, requiring blood patches.  She also reports palpitations and dyspnea, which seem to have some anxiety component.   She felt worse on both metoprolol and bystolic, and is now on diltiazem, which she seems to be tolerating.  She did have some dyspnea/palpitations after exertion, which was her first activity in the last 4 months or so, now that her foot as healed.  I think her dyspnea is due to deconditioning, and possibly a hypertensive response to exertion. I am going to set her up for a symptom limited exercise stress test to prove this.  She has not had any symptoms to suggest recurrent atrial fibrillation. She needs to limit her salt intake, as she is on Florinef/hydrocortisone for [place]'s.   I will see her again in 3 to 4 months. [EKG obtained to assist in diagnosis and management of assessed problem(s)] : EKG obtained to assist in diagnosis and management of assessed problem(s)

## 2024-02-15 NOTE — DISCUSSION/SUMMARY
[FreeTextEntry1] : Olesya had not been feeling well as of late, and her symptoms seem to be multifactorial.  She has had headaches, and is dealing with a spontaneous CSF leak, requiring blood patches.  She also reports palpitations and dyspnea, which seem to have some anxiety component.   She felt worse on both metoprolol and bystolic, and is now on diltiazem, which she seems to be tolerating.  She did have some dyspnea/palpitations after exertion, which was her first activity in the last 4 months or so, now that her foot as healed.  I think her dyspnea is due to deconditioning, and possibly a hypertensive response to exertion. I am going to set her up for a symptom limited exercise stress test to prove this.  She has not had any symptoms to suggest recurrent atrial fibrillation. She needs to limit her salt intake, as she is on Florinef/hydrocortisone for [place]'s.   I will see her again in 3 to 4 months. [EKG obtained to assist in diagnosis and management of assessed problem(s)] : EKG obtained to assist in diagnosis and management of assessed problem(s)

## 2024-02-15 NOTE — HISTORY OF PRESENT ILLNESS
[FreeTextEntry1] : Olesya is a 68 year old female here for evaluation. She does have a history of Harsha's disease, and is currently on hydrocortisone and Florinef. Her father did have bypass surgery, though did not take care of himself.  She denies toxic habits, other than being a small smoker back in the day.  I initially met her over the summer in 7/2023.  She presented with atrial fibrillation after a colonoscopy.  She saw Maddie in 10/23 as a hospital follow-up.  She had nightmares and headaches on Toprol XL, and it was changed to bisoprolol.  At the same time it was determined that her headaches were from a spontaneous CSF leak, and she is required blood patches, with the most recent one being done 10 days ago.  I last saw her 1/8/24.  Recently, she has felt a pounding feeling in the chest with an elevated blood pressure and heart rate over 100.  She saw her cardiologist at [facility], and was given a 2-day monitor.  The symptoms continued, and she eventually went to the ER.  Her bisoprolol was switched back to metoprolol, and she was eventually discharged home.  An echocardiogram demonstrated normal LV function, without significant valvular pathology.  She had not been active, and has been nonweightbearing for 2 months because of her left foot. Last month, she was able to get back to the treadmill, and afterwards had some dyspnea/palpitations. She is now off of metoprolol and nebivolol, and on diltiazem alone.  Since this time, she is feeling a little better. She has not had any symptoms to suggest recurrent atrial fibrillation.

## 2024-02-15 NOTE — HISTORY OF PRESENT ILLNESS
[FreeTextEntry1] : Olesya is a 68 year old female here for evaluation. She does have a history of Harsha's disease, and is currently on hydrocortisone and Florinef. Her father did have bypass surgery, though did not take care of himself.  She denies toxic habits, other than being a small smoker back in the day.  I initially met her over the summer in 7/2023.  She presented with atrial fibrillation after a colonoscopy.  She saw Maddie in 10/23 as a hospital follow-up.  She had nightmares and headaches on Toprol XL, and it was changed to bisoprolol.  At the same time it was determined that her headaches were from a spontaneous CSF leak, and she is required blood patches, with the most recent one being done 10 days ago.  I last saw her 1/8/24.  Recently, she has felt a pounding feeling in the chest with an elevated blood pressure and heart rate over 100.  She saw her cardiologist at [facility], and was given a 2-day monitor.  The symptoms continued, and she eventually went to the ER.  Her bisoprolol was switched back to metoprolol, and she was eventually discharged home.  An echocardiogram demonstrated normal LV function, without significant valvular pathology.  She had not been active, and has been nonweightbearing for 2 months because of her left foot. Last month, she was able to get back to the treadmill, and afterwards had some dyspnea/palpitations. She is now off of metoprolol and nebivolol, and on diltiazem alone.  Since this time, she is feeling a little better. She has not had any symptoms to suggest recurrent atrial fibrillation.

## 2024-02-16 ENCOUNTER — OFFICE (OUTPATIENT)
Dept: URBAN - METROPOLITAN AREA CLINIC 109 | Facility: CLINIC | Age: 69
Setting detail: OPHTHALMOLOGY
End: 2024-02-16
Payer: MEDICARE

## 2024-02-16 DIAGNOSIS — H00.014: ICD-10-CM

## 2024-02-16 DIAGNOSIS — B30.1: ICD-10-CM

## 2024-02-16 PROCEDURE — 99213 OFFICE O/P EST LOW 20 MIN: CPT | Performed by: OPHTHALMOLOGY

## 2024-02-16 ASSESSMENT — CONFRONTATIONAL VISUAL FIELD TEST (CVF)
OS_FINDINGS: FULL
OD_FINDINGS: FULL

## 2024-03-18 NOTE — ED ADULT TRIAGE NOTE - SPO2 (%)
Reason for Disposition   Cough (lower respiratory infection) with no complications    Protocols used: Cough-P-OH    Spoke to mom    She states that patient has been sick since Tuesday. Mom did take patient in to the ED on Thursday or Friday and mom was told that patient should be feeling better after the weekend.     Mom reports that patient continues to have a temp of around 100, cough, runny nose, congestion and sore  throat. She also complains of leg pain and is walking on her tippy toes because she says it hurts to walk on her flat feet. Mom does report there has been improvement in the way the cough sounds.    She denies any ear pain, denies sinus pain, denies any chest pain or shortness of breath.     Home care advice was reviewed with mom and she will keep the appointment tomorrow if needed.   100

## 2024-04-05 ENCOUNTER — RX RENEWAL (OUTPATIENT)
Age: 69
End: 2024-04-05

## 2024-04-18 ENCOUNTER — APPOINTMENT (OUTPATIENT)
Dept: CARDIOLOGY | Facility: CLINIC | Age: 69
End: 2024-04-18
Payer: MEDICARE

## 2024-04-18 PROCEDURE — 93015 CV STRESS TEST SUPVJ I&R: CPT

## 2024-05-01 ENCOUNTER — APPOINTMENT (OUTPATIENT)
Dept: CARDIOLOGY | Facility: CLINIC | Age: 69
End: 2024-05-01

## 2024-06-03 ENCOUNTER — APPOINTMENT (OUTPATIENT)
Dept: CARDIOLOGY | Facility: CLINIC | Age: 69
End: 2024-06-03
Payer: MEDICARE

## 2024-06-03 ENCOUNTER — NON-APPOINTMENT (OUTPATIENT)
Age: 69
End: 2024-06-03

## 2024-06-03 VITALS
SYSTOLIC BLOOD PRESSURE: 145 MMHG | OXYGEN SATURATION: 96 % | WEIGHT: 143 LBS | HEIGHT: 65 IN | HEART RATE: 77 BPM | BODY MASS INDEX: 23.82 KG/M2 | DIASTOLIC BLOOD PRESSURE: 80 MMHG

## 2024-06-03 DIAGNOSIS — R06.00 DYSPNEA, UNSPECIFIED: ICD-10-CM

## 2024-06-03 DIAGNOSIS — I48.0 PAROXYSMAL ATRIAL FIBRILLATION: ICD-10-CM

## 2024-06-03 PROCEDURE — 99214 OFFICE O/P EST MOD 30 MIN: CPT

## 2024-06-03 PROCEDURE — 93000 ELECTROCARDIOGRAM COMPLETE: CPT

## 2024-06-03 NOTE — HISTORY OF PRESENT ILLNESS
[FreeTextEntry1] : Olesya is a 68 year old female here for evaluation. She does have a history of Harsha's disease, and is currently on hydrocortisone and Florinef. Her father did have bypass surgery, though did not take care of himself.  She denies toxic habits, other than being a small smoker back in the day.  I initially met her over the summer in 7/2023.  She presented with atrial fibrillation after a colonoscopy.  She saw Maddie in 10/23 as a hospital follow-up.  She had nightmares and headaches on Toprol XL, and it was changed to bisoprolol.  At the same time it was determined that her headaches were from a spontaneous CSF leak, and she is required blood patches, with the most recent one being done 10 days ago.  I last saw her 2/24.  Prior to this visit, she felt a pounding feeling in the chest with an elevated blood pressure and heart rate over 100.  She saw her cardiologist at [facility], and was given a 2-day monitor.  The symptoms continued, and she eventually went to the ER.  Her bisoprolol was switched back to metoprolol, and she was eventually discharged home.  An echocardiogram demonstrated normal LV function, without significant valvular pathology.  She is back to activity now that her foot is better.  She is now off of metoprolol and nebivolol, and on diltiazem alone. She did have elevated BP and HR after getting lidocaine/epinephrine at her dentist.  She has not had any symptoms to suggest recurrent atrial fibrillation.   Exercise stress test without ischemia.

## 2024-06-03 NOTE — DISCUSSION/SUMMARY
[FreeTextEntry1] : Overall, Olesya is doing well.  Her palpitations have quieted down.  She felt worse on both metoprolol and bystolic, and is now on diltiazem, which she seems to be tolerating.  Her foot is better, and she is getting back to exercise. Her stress test showed no ischemia in 2024.  She has not had any symptoms to suggest recurrent atrial fibrillation. She needs to limit her salt intake, as she is on Florinef/hydrocortisone for Harsha's.  I will see her again in 4-6 months. [EKG obtained to assist in diagnosis and management of assessed problem(s)] : EKG obtained to assist in diagnosis and management of assessed problem(s)

## 2024-06-06 ENCOUNTER — APPOINTMENT (OUTPATIENT)
Dept: PEDIATRIC ALLERGY IMMUNOLOGY | Facility: CLINIC | Age: 69
End: 2024-06-06
Payer: MEDICARE

## 2024-06-06 VITALS — HEART RATE: 76 BPM | OXYGEN SATURATION: 97 % | SYSTOLIC BLOOD PRESSURE: 137 MMHG | DIASTOLIC BLOOD PRESSURE: 77 MMHG

## 2024-06-06 DIAGNOSIS — T39.1X5A: ICD-10-CM

## 2024-06-06 DIAGNOSIS — J31.0 CHRONIC RHINITIS: ICD-10-CM

## 2024-06-06 DIAGNOSIS — Z88.6 ALLERGY STATUS TO ANALGESIC AGENT: ICD-10-CM

## 2024-06-06 PROCEDURE — 99204 OFFICE O/P NEW MOD 45 MIN: CPT

## 2024-06-06 NOTE — SOCIAL HISTORY
[Apartment] : [unfilled] lives in an apartment  [Radiator/Baseboard] : heating provided by radiator(s)/baseboard(s) [Dust Mite Covers] : has dust mite covers [Bedroom] :  in bedroom [Living Area] : in living area [Cat] : cat [Smokers in Household] : there are no smokers in the home [de-identified] : ac comes from wall unit [de-identified] : staircase [de-identified] : 2 carpet

## 2024-06-06 NOTE — PHYSICAL EXAM
[Alert] : alert [No Neck Mass] : no neck mass was observed [Normal Cervical Lymph Nodes] : cervical [Pale mucosa] : no pale mucosa [Pharyngeal erythema] : no pharyngeal erythema [Clear Rhinorrhea] : no clear rhinorrhea was seen [Wheezing] : no wheezing was heard [Patches] : no patches

## 2024-06-06 NOTE — HISTORY OF PRESENT ILLNESS
[de-identified] : 68 yr old with history about 10-20 years ago of hives and itching after taking Motrin - pt took Benadryl for resolution. About 5 years ago too Aleve with similar reaction with a bit more facial swelling Pt was then OK with Tylenol and did well - recently has been taking increase amounts for headaches but about 4 weeks ago took a dose of Tylenol and above 30 min later noted increase lip swelling - took Benadryl with resolution over 12 hrs Pt has never taken Celebrex

## 2024-06-06 NOTE — ASSESSMENT
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 [phone] Will compound as Celebrex 100mg/5ml - need about 25 mg Will set up 3 dose protocol  - 20mg, 80 mg, 100 mg for total of 200 mg - will observe over 2 hrs  Total MD time spent on this encounter was 45 minutes.  This includes time devoted to preparing to see the patient with review of previous medical record, obtaining medical history, performing physical exam, counseling and patient education with patient and family, ordering medications and lab studies, documentation in the medical record and coordination of care.

## 2024-06-06 NOTE — REASON FOR VISIT
[Evaluation/Consultation] : an evaluation/consultation of [To Medication] : allergy to medication [Angioedema] : angioedema

## 2024-07-01 ENCOUNTER — RX RENEWAL (OUTPATIENT)
Age: 69
End: 2024-07-01

## 2024-07-24 ENCOUNTER — APPOINTMENT (OUTPATIENT)
Dept: OTOLARYNGOLOGY | Facility: CLINIC | Age: 69
End: 2024-07-24
Payer: MEDICARE

## 2024-07-24 VITALS
HEART RATE: 80 BPM | HEIGHT: 65 IN | WEIGHT: 142 LBS | DIASTOLIC BLOOD PRESSURE: 83 MMHG | SYSTOLIC BLOOD PRESSURE: 120 MMHG | BODY MASS INDEX: 23.66 KG/M2

## 2024-07-24 DIAGNOSIS — R49.0 DYSPHONIA: ICD-10-CM

## 2024-07-24 DIAGNOSIS — K21.9 GASTRO-ESOPHAGEAL REFLUX DISEASE W/OUT ESOPHAGITIS: ICD-10-CM

## 2024-07-24 DIAGNOSIS — J31.0 CHRONIC RHINITIS: ICD-10-CM

## 2024-07-24 DIAGNOSIS — J34.2 DEVIATED NASAL SEPTUM: ICD-10-CM

## 2024-07-24 PROCEDURE — 99213 OFFICE O/P EST LOW 20 MIN: CPT | Mod: 25

## 2024-07-24 PROCEDURE — 31575 DIAGNOSTIC LARYNGOSCOPY: CPT

## 2024-07-24 NOTE — PROCEDURE
[Unable to Cooperate with Mirror] : patient unable to cooperate with mirror [Hoarseness] : hoarseness not clearly evaluated by indirect laryngoscopy [de-identified] : Procedure:  Flexible Fiberoptic Laryngoscopy: Risks, benefits, and alternatives of flexible endoscopy were explained to the patient.  The patient gave oral consent to proceed.  The flexible scope was inserted into the right nasal cavity and advanced towards the nasopharynx.  Visualized mucosa over the turbinates and septum were as described above.  The nasopharynx with lots of secretions and erythema slight Edema and erythema of the postcricoid, arytenoids and interarytenoids.  incomplete closure of vocal cords but they move symmetrically

## 2024-07-24 NOTE — HISTORY OF PRESENT ILLNESS
[de-identified] : Patient hx of vocal cord polyp removal 2008 presents for voice hoarseness. Patient states she is currently on omeprazole 40mg in the am. States recently started driving couple cups of coffee. Notes that she does use her voice more often since she started taking care of her grandkids. She denies dysphagia, able to tolerate secretions, no hx of pneumonias, no fever, chills, shortness of breath.

## 2024-07-24 NOTE — CONSULT LETTER
[Dear  ___] : Dear  [unfilled], [Courtesy Letter:] : I had the pleasure of seeing your patient, [unfilled], in my office today. [Please see my note below.] : Please see my note below. [Referral Closing:] : Thank you very much for seeing this patient.  If you have any questions, please do not hesitate to contact me. [Sincerely,] : Sincerely, [FreeTextEntry3] : Louie Freed PA-C

## 2024-07-24 NOTE — ASSESSMENT
[FreeTextEntry1] : Reviewed and reconciled medications, allergies, PMHx, PSHx, SocHx, FMHx.    physical exam: right ear: cerumen removed via curettage left ear: cerumen removed via curettage Inflamed turbinates deviated septum  Procedure:  Flexible Fiberoptic Laryngoscopy: Risks, benefits, and alternatives of flexible endoscopy were explained to the patient.  The patient gave oral consent to proceed.  The flexible scope was inserted into the right nasal cavity and advanced towards the nasopharynx.  Visualized mucosa over the turbinates and septum were as described above.  The nasopharynx with lots of secretions and erythema slight Edema and erythema of the postcricoid, arytenoids and interarytenoids.  incomplete closure of vocal cords but they move symmetrically      Plan: videostrobe and feest Follow reflux diet avoid caffeine no late night eating      Case discussed with Dr. Aguero

## 2024-07-24 NOTE — PHYSICAL EXAM
[Midline] : trachea located in midline position [Normal] : no rashes [Hearing Loss Right Only] : normal [Hearing Loss Left Only] : normal [FreeTextEntry8] : cerumen removed via curettage [FreeTextEntry9] : cerumen removed via curettage

## 2024-08-02 ENCOUNTER — APPOINTMENT (OUTPATIENT)
Dept: CARDIOLOGY | Facility: CLINIC | Age: 69
End: 2024-08-02

## 2024-08-02 DIAGNOSIS — Z88.6 ALLERGY STATUS TO ANALGESIC AGENT: ICD-10-CM

## 2024-08-02 RX ORDER — CELECOXIB 200 MG/1
200 CAPSULE ORAL
Qty: 15 | Refills: 0 | Status: ACTIVE | COMMUNITY
Start: 2024-08-02 | End: 1900-01-01

## 2024-08-20 ENCOUNTER — APPOINTMENT (OUTPATIENT)
Dept: OTOLARYNGOLOGY | Facility: CLINIC | Age: 69
End: 2024-08-20

## 2024-08-20 PROCEDURE — 92612 ENDOSCOPY SWALLOW (FEES) VID: CPT | Mod: GN

## 2024-08-20 PROCEDURE — 31579 LARYNGOSCOPY TELESCOPIC: CPT | Mod: GN

## 2024-08-23 ENCOUNTER — APPOINTMENT (OUTPATIENT)
Dept: OTOLARYNGOLOGY | Facility: CLINIC | Age: 69
End: 2024-08-23
Payer: MEDICARE

## 2024-08-23 VITALS
HEIGHT: 65 IN | DIASTOLIC BLOOD PRESSURE: 72 MMHG | WEIGHT: 140 LBS | BODY MASS INDEX: 23.32 KG/M2 | HEART RATE: 88 BPM | SYSTOLIC BLOOD PRESSURE: 110 MMHG

## 2024-08-23 DIAGNOSIS — B37.0 CANDIDAL STOMATITIS: ICD-10-CM

## 2024-08-23 DIAGNOSIS — J38.3 OTHER DISEASES OF VOCAL CORDS: ICD-10-CM

## 2024-08-23 DIAGNOSIS — J38.4 EDEMA OF LARYNX: ICD-10-CM

## 2024-08-23 DIAGNOSIS — J31.0 CHRONIC RHINITIS: ICD-10-CM

## 2024-08-23 DIAGNOSIS — K21.9 GASTRO-ESOPHAGEAL REFLUX DISEASE W/OUT ESOPHAGITIS: ICD-10-CM

## 2024-08-23 DIAGNOSIS — R49.0 DYSPHONIA: ICD-10-CM

## 2024-08-23 DIAGNOSIS — J34.2 DEVIATED NASAL SEPTUM: ICD-10-CM

## 2024-08-23 PROCEDURE — 99214 OFFICE O/P EST MOD 30 MIN: CPT

## 2024-08-23 RX ORDER — FAMOTIDINE 20 MG/1
20 TABLET, FILM COATED ORAL
Qty: 180 | Refills: 0 | Status: ACTIVE | COMMUNITY
Start: 2024-08-23 | End: 1900-01-01

## 2024-08-23 NOTE — ASSESSMENT
[FreeTextEntry1] : Reviewed and reconciled medications, allergies, PMHx, PSHx, SocHx, FMHx.    physical exam: right ear: looks normal left ear: looks normal Inflamed turbinates trush seen in the base of tongue    videostrobe: evidence of mild edema and erythema noted. Patchy, white spots located at base of tongue suggestive of ?thrush. Baseline pooling of secretions noted postcricoid. Assessment of TVF during abduction revealed bilateral cords are white in color and contour without evidence of polyps, nodules or cysts observed. Trace glottic secretions were noted which migrated following clinician cued cough and subsequent swallow. Assessment of vocal fold adduction revealed reduced movement bilaterally. However, overall good compensation observed resulting in complete glottic closure, with only intermittent evidence of incomplete full length gap 2/2 reduced mobility bilaterally. No evidence of hyperfunction. Vocal resonance was WFL.   Fees: adequate epiglottic deflection and complete hyolaryngeal excursion without any evidence of penetration or aspiration noted There was evidence of active LPR marked by return of green tinged secretions extending postcricoid.    Plan: Continue pantroprazole 40mg in the morning Start famotidine at night and when cheating on the diet .clotrimazole rx sent to the pharmacy Start voice therapy

## 2024-08-23 NOTE — HISTORY OF PRESENT ILLNESS
[de-identified] : Patient hx of vocal cord polyp removal 2008 presents to discuss videostrobe and fees . Patient states she is currently on omeprazole 40mg in the am.

## 2024-08-23 NOTE — PHYSICAL EXAM
[Midline] : trachea located in midline position [Normal] : no masses and lesions seen, face is symmetric [Hearing Loss Right Only] : normal [Hearing Loss Left Only] : normal

## 2024-08-23 NOTE — CONSULT LETTER
[Dear  ___] : Dear  [unfilled], [Courtesy Letter:] : I had the pleasure of seeing your patient, [unfilled], in my office today. [Please see my note below.] : Please see my note below. [Referral Closing:] : Thank you very much for seeing this patient.  If you have any questions, please do not hesitate to contact me. [Sincerely,] : Sincerely, [FreeTextEntry3] : Alan Aguero MD FACS

## 2024-08-26 RX ORDER — CLOTRIMAZOLE 10 MG/1
10 LOZENGE ORAL
Qty: 40 | Refills: 0 | Status: ACTIVE | COMMUNITY
Start: 2024-08-23 | End: 1900-01-01

## 2024-08-26 RX ORDER — PANTOPRAZOLE 40 MG/1
40 TABLET, DELAYED RELEASE ORAL
Qty: 90 | Refills: 3 | Status: ACTIVE | COMMUNITY
Start: 2024-08-26 | End: 1900-01-01

## 2024-08-29 ENCOUNTER — APPOINTMENT (OUTPATIENT)
Dept: PEDIATRIC ALLERGY IMMUNOLOGY | Facility: CLINIC | Age: 69
End: 2024-08-29
Payer: MEDICARE

## 2024-08-29 VITALS
OXYGEN SATURATION: 96 % | HEART RATE: 90 BPM | RESPIRATION RATE: 18 BRPM | SYSTOLIC BLOOD PRESSURE: 118 MMHG | DIASTOLIC BLOOD PRESSURE: 78 MMHG

## 2024-08-29 DIAGNOSIS — Z88.6 ALLERGY STATUS TO ANALGESIC AGENT: ICD-10-CM

## 2024-08-29 DIAGNOSIS — T39.1X5A: ICD-10-CM

## 2024-08-29 PROCEDURE — 99212 OFFICE O/P EST SF 10 MIN: CPT | Mod: 25

## 2024-08-29 PROCEDURE — 95076 INGEST CHALLENGE INI 120 MIN: CPT

## 2024-08-29 RX ORDER — CELECOXIB 200 MG/1
200 CAPSULE ORAL
Qty: 30 | Refills: 5 | Status: ACTIVE | COMMUNITY
Start: 2024-08-29 | End: 1900-01-01

## 2024-08-29 NOTE — IMPRESSION
[FreeTextEntry2] : Oral challenge - Celebrex (100/5) - compounded at pharmacy - 3 dose protocol over 2 hrs - pt tolerated 200 mg as oral challenge - observed x 2 hrs and was OK

## 2024-08-29 NOTE — SOCIAL HISTORY
[Apartment] : [unfilled] lives in an apartment  [Radiator/Baseboard] : heating provided by radiator(s)/baseboard(s) [Dust Mite Covers] : has dust mite covers [Bedroom] :  in bedroom [Living Area] : in living area [Cat] : cat [Smokers in Household] : there are no smokers in the home [de-identified] : ac comes from wall unit [de-identified] : staircase [de-identified] : 2 carpet

## 2024-08-29 NOTE — SOCIAL HISTORY
[Apartment] : [unfilled] lives in an apartment  [Radiator/Baseboard] : heating provided by radiator(s)/baseboard(s) [Dust Mite Covers] : has dust mite covers [Bedroom] :  in bedroom [Living Area] : in living area [Cat] : cat [Smokers in Household] : there are no smokers in the home [de-identified] : ac comes from wall unit [de-identified] : staircase [de-identified] : 2 carpet

## 2024-08-29 NOTE — PHYSICAL EXAM
[Alert] : alert [No Neck Mass] : no neck mass was observed [Normal Rate] : heart rate was normal  [Normal Cervical Lymph Nodes] : cervical [Skin Intact] : skin intact  [Conjunctival Erythema] : no conjunctival erythema [Pale mucosa] : no pale mucosa [Pharyngeal erythema] : no pharyngeal erythema [Clear Rhinorrhea] : no clear rhinorrhea was seen [Wheezing] : no wheezing was heard [Patches] : no patches

## 2024-08-29 NOTE — HISTORY OF PRESENT ILLNESS
[de-identified] : 68 yr old with history about 10-20 years ago of hives and itching after taking Motrin - pt took Benadryl for resolution. About 5 years ago too Aleve with similar reaction with a bit more facial swelling Pt was then OK with Tylenol and did well - recently has been taking increase amounts for headaches but about 4 weeks ago took a dose of Tylenol and above 30 min later noted increase lip swelling - took Benadryl with resolution over 12 hrs Pt has never taken Celebrex Pt would like to be able to find a non narcotic pain reliever she can use

## 2024-08-29 NOTE — ASSESSMENT
[FreeTextEntry1] : 68 yr old with two known previous hypersensitivity reaction to NSAID 10 -15 years ago with urticaria and angioedema - now with similar episodes of acetaminophen  For now suggest avoidance of NSAID and acetaminophen  - can consider challenge to the latter if needed Suggest for now Celebrex challenge  Oral challenge today as below - pt tolerated Celebrex 200 mg over 2 hrs and did well - OK to try again as 200 mg qd if needed.   Send Rx to [facility] - [place] [phone] Will compound as Celebrex 100mg/5ml - need about 25 mg Will set up 3 dose protocol  - 20mg, 80 mg, 100 mg for total of 200 mg - will observe over 2 hrs

## 2024-08-29 NOTE — HISTORY OF PRESENT ILLNESS
[de-identified] : 68 yr old with history about 10-20 years ago of hives and itching after taking Motrin - pt took Benadryl for resolution. About 5 years ago too Aleve with similar reaction with a bit more facial swelling Pt was then OK with Tylenol and did well - recently has been taking increase amounts for headaches but about 4 weeks ago took a dose of Tylenol and above 30 min later noted increase lip swelling - took Benadryl with resolution over 12 hrs Pt has never taken Celebrex Pt would like to be able to find a non narcotic pain reliever she can use

## 2024-08-29 NOTE — ASSESSMENT
[FreeTextEntry1] : 68 yr old with two known previous hypersensitivity reaction to NSAID 10 -15 years ago with urticaria and angioedema - now with similar episodes of acetaminophen  For now suggest avoidance of NSAID and acetaminophen  - can consider challenge to the latter if needed Suggest for now Celebrex challenge  Oral challenge today as below - pt tolerated Celebrex 200 mg over 2 hrs and did well - OK to try again as 200 mg qd if needed.   Send Rx to [facility] - [place] [phone] Will compound as Celebrex 100mg/5ml - need about 25 mg Will set up 3 dose protocol  - 20mg, 80 mg, 100 mg for total of 200 mg - will observe over 2 hrs

## 2024-09-19 ENCOUNTER — APPOINTMENT (OUTPATIENT)
Dept: OTOLARYNGOLOGY | Facility: CLINIC | Age: 69
End: 2024-09-19
Payer: MEDICARE

## 2024-09-19 PROCEDURE — 92507 TX SP LANG VOICE COMM INDIV: CPT | Mod: GN

## 2024-09-26 ENCOUNTER — APPOINTMENT (OUTPATIENT)
Dept: OTOLARYNGOLOGY | Facility: CLINIC | Age: 69
End: 2024-09-26
Payer: MEDICARE

## 2024-09-26 PROCEDURE — 92507 TX SP LANG VOICE COMM INDIV: CPT | Mod: GN

## 2024-10-01 ENCOUNTER — APPOINTMENT (OUTPATIENT)
Dept: CARDIOLOGY | Facility: CLINIC | Age: 69
End: 2024-10-01
Payer: MEDICARE

## 2024-10-01 PROCEDURE — 93000 ELECTROCARDIOGRAM COMPLETE: CPT

## 2024-10-02 ENCOUNTER — NON-APPOINTMENT (OUTPATIENT)
Age: 69
End: 2024-10-02

## 2024-10-02 ENCOUNTER — RX RENEWAL (OUTPATIENT)
Age: 69
End: 2024-10-02

## 2024-10-03 ENCOUNTER — NON-APPOINTMENT (OUTPATIENT)
Age: 69
End: 2024-10-03

## 2024-10-11 ENCOUNTER — APPOINTMENT (OUTPATIENT)
Dept: OTOLARYNGOLOGY | Facility: CLINIC | Age: 69
End: 2024-10-11

## 2024-10-16 ENCOUNTER — APPOINTMENT (OUTPATIENT)
Dept: OTOLARYNGOLOGY | Facility: CLINIC | Age: 69
End: 2024-10-16

## 2024-10-17 ENCOUNTER — APPOINTMENT (OUTPATIENT)
Dept: OTOLARYNGOLOGY | Facility: CLINIC | Age: 69
End: 2024-10-17
Payer: MEDICARE

## 2024-10-17 PROCEDURE — 92507 TX SP LANG VOICE COMM INDIV: CPT | Mod: GN

## 2024-10-23 ENCOUNTER — NON-APPOINTMENT (OUTPATIENT)
Age: 69
End: 2024-10-23

## 2024-10-23 ENCOUNTER — APPOINTMENT (OUTPATIENT)
Dept: CARDIOLOGY | Facility: CLINIC | Age: 69
End: 2024-10-23
Payer: MEDICARE

## 2024-10-23 VITALS
DIASTOLIC BLOOD PRESSURE: 75 MMHG | OXYGEN SATURATION: 93 % | BODY MASS INDEX: 21.99 KG/M2 | HEART RATE: 82 BPM | HEIGHT: 65 IN | WEIGHT: 132 LBS | SYSTOLIC BLOOD PRESSURE: 130 MMHG

## 2024-10-23 VITALS — DIASTOLIC BLOOD PRESSURE: 78 MMHG | SYSTOLIC BLOOD PRESSURE: 125 MMHG

## 2024-10-23 DIAGNOSIS — I48.0 PAROXYSMAL ATRIAL FIBRILLATION: ICD-10-CM

## 2024-10-23 DIAGNOSIS — R00.2 PALPITATIONS: ICD-10-CM

## 2024-10-23 PROCEDURE — 99214 OFFICE O/P EST MOD 30 MIN: CPT

## 2024-10-23 PROCEDURE — 93000 ELECTROCARDIOGRAM COMPLETE: CPT

## 2024-11-05 ENCOUNTER — EMERGENCY (EMERGENCY)
Facility: HOSPITAL | Age: 69
LOS: 1 days | Discharge: HOME | End: 2024-11-05
Attending: STUDENT IN AN ORGANIZED HEALTH CARE EDUCATION/TRAINING PROGRAM | Admitting: STUDENT IN AN ORGANIZED HEALTH CARE EDUCATION/TRAINING PROGRAM
Payer: MEDICARE

## 2024-11-05 VITALS
WEIGHT: 132.06 LBS | SYSTOLIC BLOOD PRESSURE: 134 MMHG | DIASTOLIC BLOOD PRESSURE: 98 MMHG | HEIGHT: 65 IN | RESPIRATION RATE: 16 BRPM | HEART RATE: 102 BPM | OXYGEN SATURATION: 97 % | TEMPERATURE: 98 F

## 2024-11-05 VITALS
OXYGEN SATURATION: 98 % | SYSTOLIC BLOOD PRESSURE: 125 MMHG | HEART RATE: 81 BPM | RESPIRATION RATE: 18 BRPM | TEMPERATURE: 98 F | DIASTOLIC BLOOD PRESSURE: 76 MMHG

## 2024-11-05 DIAGNOSIS — Z98.890 OTHER SPECIFIED POSTPROCEDURAL STATES: Chronic | ICD-10-CM

## 2024-11-05 DIAGNOSIS — Z98.89 OTHER SPECIFIED POSTPROCEDURAL STATES: Chronic | ICD-10-CM

## 2024-11-05 LAB
ALBUMIN SERPL ELPH-MCNC: 3.4 G/DL — SIGNIFICANT CHANGE UP (ref 3.3–5)
ALP SERPL-CCNC: 63 U/L — SIGNIFICANT CHANGE UP (ref 40–120)
ALT FLD-CCNC: 22 U/L — SIGNIFICANT CHANGE UP (ref 12–78)
ANION GAP SERPL CALC-SCNC: 4 MMOL/L — LOW (ref 5–17)
AST SERPL-CCNC: 18 U/L — SIGNIFICANT CHANGE UP (ref 15–37)
BASOPHILS # BLD AUTO: 0.07 K/UL — SIGNIFICANT CHANGE UP (ref 0–0.2)
BASOPHILS NFR BLD AUTO: 1.1 % — SIGNIFICANT CHANGE UP (ref 0–2)
BILIRUB SERPL-MCNC: 0.3 MG/DL — SIGNIFICANT CHANGE UP (ref 0.2–1.2)
BUN SERPL-MCNC: 16 MG/DL — SIGNIFICANT CHANGE UP (ref 7–23)
CALCIUM SERPL-MCNC: 9.4 MG/DL — SIGNIFICANT CHANGE UP (ref 8.5–10.1)
CHLORIDE SERPL-SCNC: 106 MMOL/L — SIGNIFICANT CHANGE UP (ref 96–108)
CO2 SERPL-SCNC: 27 MMOL/L — SIGNIFICANT CHANGE UP (ref 22–31)
CREAT SERPL-MCNC: 0.9 MG/DL — SIGNIFICANT CHANGE UP (ref 0.5–1.3)
D DIMER BLD IA.RAPID-MCNC: <150 NG/ML DDU — SIGNIFICANT CHANGE UP
EGFR: 71 ML/MIN/1.73M2 — SIGNIFICANT CHANGE UP
EOSINOPHIL # BLD AUTO: 0.02 K/UL — SIGNIFICANT CHANGE UP (ref 0–0.5)
EOSINOPHIL NFR BLD AUTO: 0.3 % — SIGNIFICANT CHANGE UP (ref 0–6)
FLUAV AG NPH QL: SIGNIFICANT CHANGE UP
FLUBV AG NPH QL: SIGNIFICANT CHANGE UP
GLUCOSE SERPL-MCNC: 94 MG/DL — SIGNIFICANT CHANGE UP (ref 70–99)
HCT VFR BLD CALC: 43.1 % — SIGNIFICANT CHANGE UP (ref 34.5–45)
HGB BLD-MCNC: 14.8 G/DL — SIGNIFICANT CHANGE UP (ref 11.5–15.5)
IMM GRANULOCYTES NFR BLD AUTO: 0.5 % — SIGNIFICANT CHANGE UP (ref 0–0.9)
LIDOCAIN IGE QN: 28 U/L — SIGNIFICANT CHANGE UP (ref 13–75)
LYMPHOCYTES # BLD AUTO: 1.63 K/UL — SIGNIFICANT CHANGE UP (ref 1–3.3)
LYMPHOCYTES # BLD AUTO: 25 % — SIGNIFICANT CHANGE UP (ref 13–44)
MAGNESIUM SERPL-MCNC: 2.5 MG/DL — SIGNIFICANT CHANGE UP (ref 1.6–2.6)
MCHC RBC-ENTMCNC: 30.5 PG — SIGNIFICANT CHANGE UP (ref 27–34)
MCHC RBC-ENTMCNC: 34.3 G/DL — SIGNIFICANT CHANGE UP (ref 32–36)
MCV RBC AUTO: 88.7 FL — SIGNIFICANT CHANGE UP (ref 80–100)
MONOCYTES # BLD AUTO: 0.39 K/UL — SIGNIFICANT CHANGE UP (ref 0–0.9)
MONOCYTES NFR BLD AUTO: 6 % — SIGNIFICANT CHANGE UP (ref 2–14)
NEUTROPHILS # BLD AUTO: 4.38 K/UL — SIGNIFICANT CHANGE UP (ref 1.8–7.4)
NEUTROPHILS NFR BLD AUTO: 67.1 % — SIGNIFICANT CHANGE UP (ref 43–77)
NRBC # BLD: 0 /100 WBCS — SIGNIFICANT CHANGE UP (ref 0–0)
NT-PROBNP SERPL-SCNC: 52 PG/ML — SIGNIFICANT CHANGE UP (ref 0–125)
PLATELET # BLD AUTO: 290 K/UL — SIGNIFICANT CHANGE UP (ref 150–400)
POTASSIUM SERPL-MCNC: 3.9 MMOL/L — SIGNIFICANT CHANGE UP (ref 3.5–5.3)
POTASSIUM SERPL-SCNC: 3.9 MMOL/L — SIGNIFICANT CHANGE UP (ref 3.5–5.3)
PROT SERPL-MCNC: 6.6 G/DL — SIGNIFICANT CHANGE UP (ref 6–8.3)
RBC # BLD: 4.86 M/UL — SIGNIFICANT CHANGE UP (ref 3.8–5.2)
RBC # FLD: 12 % — SIGNIFICANT CHANGE UP (ref 10.3–14.5)
RSV RNA NPH QL NAA+NON-PROBE: SIGNIFICANT CHANGE UP
SARS-COV-2 RNA SPEC QL NAA+PROBE: SIGNIFICANT CHANGE UP
SODIUM SERPL-SCNC: 137 MMOL/L — SIGNIFICANT CHANGE UP (ref 135–145)
TROPONIN I, HIGH SENSITIVITY RESULT: 4.2 NG/L — SIGNIFICANT CHANGE UP
WBC # BLD: 6.52 K/UL — SIGNIFICANT CHANGE UP (ref 3.8–10.5)
WBC # FLD AUTO: 6.52 K/UL — SIGNIFICANT CHANGE UP (ref 3.8–10.5)

## 2024-11-05 PROCEDURE — 93010 ELECTROCARDIOGRAM REPORT: CPT

## 2024-11-05 PROCEDURE — 76705 ECHO EXAM OF ABDOMEN: CPT | Mod: 26

## 2024-11-05 PROCEDURE — 71045 X-RAY EXAM CHEST 1 VIEW: CPT | Mod: 26

## 2024-11-05 PROCEDURE — 99285 EMERGENCY DEPT VISIT HI MDM: CPT

## 2024-11-05 RX ORDER — ONDANSETRON HYDROCHLORIDE 2 MG/ML
4 INJECTION, SOLUTION INTRAMUSCULAR; INTRAVENOUS ONCE
Refills: 0 | Status: COMPLETED | OUTPATIENT
Start: 2024-11-05 | End: 2024-11-05

## 2024-11-05 RX ORDER — SODIUM CHLORIDE 9 MG/ML
1000 INJECTION, SOLUTION INTRAMUSCULAR; INTRAVENOUS; SUBCUTANEOUS ONCE
Refills: 0 | Status: COMPLETED | OUTPATIENT
Start: 2024-11-05 | End: 2024-11-05

## 2024-11-05 RX ORDER — FAMOTIDINE 10 MG/ML
20 INJECTION INTRAVENOUS ONCE
Refills: 0 | Status: COMPLETED | OUTPATIENT
Start: 2024-11-05 | End: 2024-11-05

## 2024-11-05 RX ADMIN — ONDANSETRON HYDROCHLORIDE 4 MILLIGRAM(S): 2 INJECTION, SOLUTION INTRAMUSCULAR; INTRAVENOUS at 14:34

## 2024-11-05 RX ADMIN — FAMOTIDINE 20 MILLIGRAM(S): 10 INJECTION INTRAVENOUS at 16:10

## 2024-11-05 RX ADMIN — SODIUM CHLORIDE 1000 MILLILITER(S): 9 INJECTION, SOLUTION INTRAMUSCULAR; INTRAVENOUS; SUBCUTANEOUS at 14:33

## 2024-11-05 NOTE — ED PROVIDER NOTE - CARE PROVIDER_API CALL
Chin Goodwin  Cardiovascular Disease  [address] [place] [address]  Phone: [phone]  Fax: [phone]  Follow Up Time:     Juan David Rico  Gastroenterology  [address] [place] [id number]  Phone: [phone]  Fax: [phone]  Follow Up Time:

## 2024-11-05 NOTE — ED ADULT NURSE NOTE - OBJECTIVE STATEMENT
pt to ED A&Ox4 to ED with c/o not feeling well for the last few days with some headaches and abdominal discomfort. has a hx of CSF leak 1 year ago. went to neurologist last week where she was told to go for a f/u MRI/MRA of brain. pt speech clear and logical, moving all extremities with equal strength. +PERRL. abdomen soft nontender +nausea without vomiting. states cardiologist Dr Goodwin stopped her cardizem 2 weeks ago because she was having anxiety. today took her grand daughter for a walk when she got lightheaded and had palpitations. said her HR was in 130s at this time.

## 2024-11-05 NOTE — ED PROVIDER NOTE - PROGRESS NOTE DETAILS
Patient was re-evaluated at this time and they are feeling much better. her vital signs are normal, troponin and dimer negative. normal LFT's and CBD likely age related WNL. she has appointment with GI tomorrow and has an endoscopy planned. I instructed her to follow bland diet and follow up with her cardiologist with her holter monitor. The Patient will be discharged home at this time. Their lab and/or imaging results were explained with the patient and they were instructed to go over them with their PCP. All questions were answered at this time.     Patient was told to follow up with their PCP within the next 2 days. they were told to return to the ED if they have shortness of breath, chest pain, fevers    patient will be discharged home at this time, they are in agreement with the plan

## 2024-11-05 NOTE — ED PROVIDER NOTE - PATIENT PORTAL LINK FT
You can access the FollowMyHealth Patient Portal offered by [facility] by registering at the following website: [email or web address]. By joining [employer]’s FollowMyHealth portal, you will also be able to view your health information using other applications (apps) compatible with our system.

## 2024-11-05 NOTE — ED PROVIDER NOTE - OBJECTIVE STATEMENT
68F with PMh of PUD, addisons, (1 brief episode of a fib) who presents with palpitations and Victor with exertion. patient reports feeling this way past few days, today she walked her granddaughter around the block and felt heart racing and some sob. she has been having upper abodminal discomfort this past week with some nausea as well. minimal PO intake due to her symptoms. she was on cardizem for this brief a fib episode but stopped 2 weeks ago per cardiology, has a holter monitor on 68F with PMh of PUD, addisons, (1 brief episode of a fib) who presents with palpitations and Victor with exertion. patient reports feeling this way past few days, today she walked her granddaughter around the block and felt heart racing and some sob. she has been having upper abdominal discomfort this past week with some nausea as well. minimal PO intake due to her symptoms. she was on cardizem for this brief a fib episode but stopped 2 weeks ago per cardiology, has a holter monitor on

## 2024-11-05 NOTE — ED ADULT NURSE NOTE - AS PAIN REST
Treatment Plan:    Continue Adderall (amphetamine salts) XR 30 mg by mouth daily in AM.    Add Adderall (amphetamine salts) IR 10 mg by mouth daily at Noon. May consider dose increase if needed.     Continue all other medications as reviewed per electronic medical record today.     Safety plan reviewed. To the Emergency Department as needed or call after hours crisis line at [phone] or [phone].     To schedule individual or family therapy, call [facility] at [phone].   Thank you for our work together in the Psychiatry Collaborative Care Model at [facility]. This is our last visit and I am returning your care back to your Primary Care Provider Brenden Piña MD . If you are not doing well, please contact your Primary Care Provider office.     Follow up with primary care provider as planned or for acute medical concerns.    I recommend connecting with PCP in 2-4 weeks for a follow up.   
4 (moderate pain)

## 2024-11-05 NOTE — ED PROVIDER NOTE - CLINICAL SUMMARY MEDICAL DECISION MAKING FREE TEXT BOX
68F with PMh of PUD, addisons, (1 brief episode of a fib) who presents with palpitations and Victor with exertion. patient reports feeling this way past few days, today she walked her granddaughter around the block and felt heart racing and some sob. she has been having upper abodminal discomfort this past week with some nausea as well. minimal PO intake due to her symptoms. she was on cardizem for this brief a fib episode but stopped 2 weeks ago per cardiology, has a holter monitor on    PE: Patient is well appearing, no acute distress, anxious, no signs of head trauma, lungs clear bilaterally, abdomen is soft and nontender to palpation without guarding or rebound, normal pulses in all extremities    labs, cardiac monitoring, imaging, medications, reassess

## 2024-11-05 NOTE — ED ADULT TRIAGE NOTE - CHIEF COMPLAINT QUOTE
not feeling well for past couple of days- today c/o palpitations with lightheadedness- c/o headache, abdominal pain* couple of days- h/o CSF leak *1 year ago- due for MRI on saturday

## 2024-11-07 ENCOUNTER — APPOINTMENT (OUTPATIENT)
Dept: OTOLARYNGOLOGY | Facility: CLINIC | Age: 69
End: 2024-11-07

## 2024-11-18 ENCOUNTER — NON-APPOINTMENT (OUTPATIENT)
Age: 69
End: 2024-11-18

## 2024-11-19 ENCOUNTER — RX RENEWAL (OUTPATIENT)
Age: 69
End: 2024-11-19

## 2024-11-20 PROCEDURE — 85379 FIBRIN DEGRADATION QUANT: CPT

## 2024-11-20 PROCEDURE — 80053 COMPREHEN METABOLIC PANEL: CPT

## 2024-11-20 PROCEDURE — 36415 COLL VENOUS BLD VENIPUNCTURE: CPT

## 2024-11-20 PROCEDURE — 83690 ASSAY OF LIPASE: CPT

## 2024-11-20 PROCEDURE — 87637 SARSCOV2&INF A&B&RSV AMP PRB: CPT

## 2024-11-20 PROCEDURE — 71045 X-RAY EXAM CHEST 1 VIEW: CPT

## 2024-11-20 PROCEDURE — 85025 COMPLETE CBC W/AUTO DIFF WBC: CPT

## 2024-11-20 PROCEDURE — 96375 TX/PRO/DX INJ NEW DRUG ADDON: CPT

## 2024-11-20 PROCEDURE — 99285 EMERGENCY DEPT VISIT HI MDM: CPT | Mod: 25

## 2024-11-20 PROCEDURE — 76705 ECHO EXAM OF ABDOMEN: CPT

## 2024-11-20 PROCEDURE — 84484 ASSAY OF TROPONIN QUANT: CPT

## 2024-11-20 PROCEDURE — 93005 ELECTROCARDIOGRAM TRACING: CPT

## 2024-11-20 PROCEDURE — 83880 ASSAY OF NATRIURETIC PEPTIDE: CPT

## 2024-11-20 PROCEDURE — 96374 THER/PROPH/DIAG INJ IV PUSH: CPT

## 2024-11-20 PROCEDURE — 83735 ASSAY OF MAGNESIUM: CPT

## 2024-11-21 ENCOUNTER — APPOINTMENT (OUTPATIENT)
Dept: OTOLARYNGOLOGY | Facility: CLINIC | Age: 69
End: 2024-11-21

## 2024-11-22 ENCOUNTER — APPOINTMENT (OUTPATIENT)
Dept: OTOLARYNGOLOGY | Facility: CLINIC | Age: 69
End: 2024-11-22
Payer: MEDICARE

## 2024-11-22 VITALS
HEART RATE: 81 BPM | DIASTOLIC BLOOD PRESSURE: 81 MMHG | BODY MASS INDEX: 22.18 KG/M2 | WEIGHT: 133.13 LBS | HEIGHT: 65 IN | SYSTOLIC BLOOD PRESSURE: 138 MMHG

## 2024-11-22 DIAGNOSIS — J01.40 ACUTE PANSINUSITIS, UNSPECIFIED: ICD-10-CM

## 2024-11-22 DIAGNOSIS — K21.9 GASTRO-ESOPHAGEAL REFLUX DISEASE W/OUT ESOPHAGITIS: ICD-10-CM

## 2024-11-22 DIAGNOSIS — J31.0 CHRONIC RHINITIS: ICD-10-CM

## 2024-11-22 DIAGNOSIS — J38.4 EDEMA OF LARYNX: ICD-10-CM

## 2024-11-22 DIAGNOSIS — J34.2 DEVIATED NASAL SEPTUM: ICD-10-CM

## 2024-11-22 DIAGNOSIS — R49.0 DYSPHONIA: ICD-10-CM

## 2024-11-22 PROCEDURE — 31231 NASAL ENDOSCOPY DX: CPT

## 2024-11-22 PROCEDURE — 99213 OFFICE O/P EST LOW 20 MIN: CPT | Mod: 25

## 2024-11-22 RX ORDER — AMOXICILLIN AND CLAVULANATE POTASSIUM 875; 125 MG/1; MG/1
875-125 TABLET, COATED ORAL
Qty: 20 | Refills: 1 | Status: ACTIVE | COMMUNITY
Start: 2024-11-22 | End: 1900-01-01

## 2024-12-13 ENCOUNTER — APPOINTMENT (OUTPATIENT)
Dept: OTOLARYNGOLOGY | Facility: CLINIC | Age: 69
End: 2024-12-13
Payer: MEDICARE

## 2024-12-13 PROCEDURE — 92507 TX SP LANG VOICE COMM INDIV: CPT | Mod: GN

## 2024-12-23 ENCOUNTER — APPOINTMENT (OUTPATIENT)
Dept: OTOLARYNGOLOGY | Facility: CLINIC | Age: 69
End: 2024-12-23
Payer: MEDICARE

## 2024-12-23 VITALS
BODY MASS INDEX: 22.33 KG/M2 | WEIGHT: 134 LBS | DIASTOLIC BLOOD PRESSURE: 84 MMHG | HEART RATE: 97 BPM | HEIGHT: 65 IN | SYSTOLIC BLOOD PRESSURE: 130 MMHG

## 2024-12-23 DIAGNOSIS — J01.90 ACUTE SINUSITIS, UNSPECIFIED: ICD-10-CM

## 2024-12-23 PROCEDURE — 99213 OFFICE O/P EST LOW 20 MIN: CPT

## 2024-12-23 RX ORDER — CEFDINIR 300 MG/1
300 CAPSULE ORAL
Qty: 20 | Refills: 0 | Status: ACTIVE | COMMUNITY
Start: 2024-12-23 | End: 1900-01-01

## 2024-12-23 RX ORDER — FLUTICASONE PROPIONATE 50 UG/1
50 SPRAY, METERED NASAL
Qty: 3 | Refills: 2 | Status: ACTIVE | COMMUNITY
Start: 2024-12-23 | End: 1900-01-01

## 2025-01-02 ENCOUNTER — RX RENEWAL (OUTPATIENT)
Age: 70
End: 2025-01-02

## 2025-01-23 ENCOUNTER — APPOINTMENT (OUTPATIENT)
Dept: OTOLARYNGOLOGY | Facility: CLINIC | Age: 70
End: 2025-01-23
Payer: MEDICARE

## 2025-01-23 ENCOUNTER — NON-APPOINTMENT (OUTPATIENT)
Age: 70
End: 2025-01-23

## 2025-01-23 VITALS
HEART RATE: 101 BPM | DIASTOLIC BLOOD PRESSURE: 85 MMHG | WEIGHT: 132 LBS | HEIGHT: 65 IN | SYSTOLIC BLOOD PRESSURE: 121 MMHG | BODY MASS INDEX: 21.99 KG/M2

## 2025-01-23 DIAGNOSIS — J31.0 CHRONIC RHINITIS: ICD-10-CM

## 2025-01-23 PROCEDURE — 99213 OFFICE O/P EST LOW 20 MIN: CPT

## 2025-02-05 ENCOUNTER — APPOINTMENT (OUTPATIENT)
Dept: OTOLARYNGOLOGY | Facility: CLINIC | Age: 70
End: 2025-02-05

## 2025-02-21 ENCOUNTER — APPOINTMENT (OUTPATIENT)
Dept: OTOLARYNGOLOGY | Facility: CLINIC | Age: 70
End: 2025-02-21

## 2025-02-22 ENCOUNTER — RX ONLY (RX ONLY)
Age: 70
End: 2025-02-22

## 2025-02-22 ENCOUNTER — OFFICE (OUTPATIENT)
Dept: URBAN - METROPOLITAN AREA CLINIC 63 | Facility: CLINIC | Age: 70
Setting detail: OPHTHALMOLOGY
End: 2025-02-22
Payer: MEDICARE

## 2025-02-22 DIAGNOSIS — H01.001: ICD-10-CM

## 2025-02-22 DIAGNOSIS — H01.002: ICD-10-CM

## 2025-02-22 DIAGNOSIS — H01.004: ICD-10-CM

## 2025-02-22 DIAGNOSIS — H00.014: ICD-10-CM

## 2025-02-22 DIAGNOSIS — H01.005: ICD-10-CM

## 2025-02-22 PROCEDURE — 92012 INTRM OPH EXAM EST PATIENT: CPT | Performed by: STUDENT IN AN ORGANIZED HEALTH CARE EDUCATION/TRAINING PROGRAM

## 2025-02-22 RX ORDER — TOBRAMYCIN AND DEXAMETHASONE 3; 1 MG/ML; MG/ML
SUSPENSION/ DROPS OPHTHALMIC
Qty: 1 | Refills: 0 | Status: ACTIVE | OUTPATIENT

## 2025-02-22 ASSESSMENT — LID EXAM ASSESSMENTS
OD_BLEPHARITIS: RLL RUL 1+
OS_BLEPHARITIS: LLL LUL 1+

## 2025-02-22 ASSESSMENT — KERATOMETRY
OD_AXISANGLE_DEGREES: 096
OS_AXISANGLE_DEGREES: 104
OD_K1POWER_DIOPTERS: 44.25
OS_K1POWER_DIOPTERS: 45.25
OS_K2POWER_DIOPTERS: 45.75
OD_K2POWER_DIOPTERS: 44.75

## 2025-02-22 ASSESSMENT — REFRACTION_AUTOREFRACTION
OD_AXIS: 173
OS_CYLINDER: -0.50
OD_SPHERE: +0.75
OS_SPHERE: 0.00
OD_CYLINDER: -0.75
OS_AXIS: 031

## 2025-02-22 ASSESSMENT — CONFRONTATIONAL VISUAL FIELD TEST (CVF)
OS_FINDINGS: FULL
OD_FINDINGS: FULL

## 2025-02-22 ASSESSMENT — TONOMETRY
OS_IOP_MMHG: 18
OD_IOP_MMHG: 17

## 2025-02-22 ASSESSMENT — VISUAL ACUITY
OS_BCVA: 20/20-1
OD_BCVA: 20/20-1

## 2025-03-05 ENCOUNTER — OFFICE (OUTPATIENT)
Dept: URBAN - METROPOLITAN AREA CLINIC 109 | Facility: CLINIC | Age: 70
Setting detail: OPHTHALMOLOGY
End: 2025-03-05
Payer: MEDICARE

## 2025-03-05 DIAGNOSIS — H01.00B: ICD-10-CM

## 2025-03-05 DIAGNOSIS — H01.00A: ICD-10-CM

## 2025-03-05 DIAGNOSIS — H16.223: ICD-10-CM

## 2025-03-05 DIAGNOSIS — H02.89: ICD-10-CM

## 2025-03-05 PROCEDURE — 68761 CLOSE TEAR DUCT OPENING: CPT | Mod: 50 | Performed by: OPHTHALMOLOGY

## 2025-03-05 PROCEDURE — 99213 OFFICE O/P EST LOW 20 MIN: CPT | Mod: 25 | Performed by: OPHTHALMOLOGY

## 2025-03-05 ASSESSMENT — KERATOMETRY
OS_K2POWER_DIOPTERS: 45.75
OS_AXISANGLE_DEGREES: 104
OD_AXISANGLE_DEGREES: 096
OD_K1POWER_DIOPTERS: 44.25
OD_K2POWER_DIOPTERS: 44.75
OS_K1POWER_DIOPTERS: 45.25

## 2025-03-05 ASSESSMENT — CONFRONTATIONAL VISUAL FIELD TEST (CVF)
OS_FINDINGS: FULL
OD_FINDINGS: FULL

## 2025-03-05 ASSESSMENT — PUNCTA - ASSESSMENT
OS_PUNCTA: 3MON PLUG
OD_PUNCTA: 3MON PLUG

## 2025-03-05 ASSESSMENT — VISUAL ACUITY
OD_BCVA: 20/20-1
OS_BCVA: 20/20-1

## 2025-03-05 ASSESSMENT — LID EXAM ASSESSMENTS
OS_BLEPHARITIS: LLL LUL 1+
OD_BLEPHARITIS: RLL RUL 1+

## 2025-03-05 ASSESSMENT — REFRACTION_AUTOREFRACTION
OD_CYLINDER: -1.25
OS_AXIS: 26
OD_AXIS: 179
OS_CYLINDER: -0.25
OS_SPHERE: 0.00
OD_SPHERE: +0.75

## 2025-03-05 ASSESSMENT — TONOMETRY
OS_IOP_MMHG: 15
OS_IOP_MMHG: 14
OD_IOP_MMHG: 15
OD_IOP_MMHG: 15

## 2025-03-05 ASSESSMENT — SUPERFICIAL PUNCTATE KERATITIS (SPK)
OD_SPK: 2+ 3+
OS_SPK: 2+ 3+

## 2025-03-05 ASSESSMENT — LACRIMAL DUCT - ASSESSMENT
OD_LACRIMAL_DUCT: OASYS .3
OS_LACRIMAL_DUCT: OASYS .3

## 2025-03-10 ENCOUNTER — OFFICE (OUTPATIENT)
Dept: URBAN - METROPOLITAN AREA CLINIC 109 | Facility: CLINIC | Age: 70
Setting detail: OPHTHALMOLOGY
End: 2025-03-10
Payer: MEDICARE

## 2025-03-10 DIAGNOSIS — H00.14: ICD-10-CM

## 2025-03-10 PROBLEM — H16.223 DRY EYE SYNDROME K SICCA; BOTH EYES: Status: ACTIVE | Noted: 2025-03-05

## 2025-03-10 PROBLEM — H01.004 BLEPHARITIS; RIGHT UPPER LID, RIGHT LOWER LID, LEFT UPPER LID, LEFT LOWER LID: Status: ACTIVE | Noted: 2025-03-10

## 2025-03-10 PROBLEM — H01.002 BLEPHARITIS; RIGHT UPPER LID, RIGHT LOWER LID, LEFT UPPER LID, LEFT LOWER LID: Status: ACTIVE | Noted: 2025-03-10

## 2025-03-10 PROBLEM — H01.001 BLEPHARITIS; RIGHT UPPER LID, RIGHT LOWER LID, LEFT UPPER LID, LEFT LOWER LID: Status: ACTIVE | Noted: 2025-03-10

## 2025-03-10 PROBLEM — H01.005 BLEPHARITIS; RIGHT UPPER LID, RIGHT LOWER LID, LEFT UPPER LID, LEFT LOWER LID: Status: ACTIVE | Noted: 2025-03-10

## 2025-03-10 PROCEDURE — 99213 OFFICE O/P EST LOW 20 MIN: CPT | Mod: 24 | Performed by: OPHTHALMOLOGY

## 2025-03-10 ASSESSMENT — KERATOMETRY
OD_AXISANGLE_DEGREES: 096
OS_K1POWER_DIOPTERS: 45.25
OS_K2POWER_DIOPTERS: 45.75
OS_AXISANGLE_DEGREES: 104
OD_K1POWER_DIOPTERS: 44.25
OD_K2POWER_DIOPTERS: 44.75

## 2025-03-10 ASSESSMENT — VISUAL ACUITY
OD_BCVA: 20/20
OS_BCVA: 20/20

## 2025-03-10 ASSESSMENT — REFRACTION_AUTOREFRACTION
OS_SPHERE: 0.00
OS_AXIS: 26
OD_CYLINDER: -1.25
OD_SPHERE: +0.75
OD_AXIS: 179
OS_CYLINDER: -0.25

## 2025-03-10 ASSESSMENT — SUPERFICIAL PUNCTATE KERATITIS (SPK)
OD_SPK: 2+ 3+
OS_SPK: 2+ 3+

## 2025-03-10 ASSESSMENT — LID EXAM ASSESSMENTS
OD_BLEPHARITIS: RLL RUL 1+
OS_BLEPHARITIS: LLL LUL 1+

## 2025-03-10 ASSESSMENT — LACRIMAL DUCT - ASSESSMENT
OS_LACRIMAL_DUCT: OASYS .3
OD_LACRIMAL_DUCT: OASYS .3

## 2025-03-10 ASSESSMENT — CONFRONTATIONAL VISUAL FIELD TEST (CVF)
OS_FINDINGS: FULL
OD_FINDINGS: FULL

## 2025-03-10 ASSESSMENT — PUNCTA - ASSESSMENT
OD_PUNCTA: 3MON PLUG
OS_PUNCTA: 3MON PLUG

## 2025-03-11 ENCOUNTER — APPOINTMENT (OUTPATIENT)
Dept: ALLERGY | Facility: CLINIC | Age: 70
End: 2025-03-11

## 2025-03-12 ENCOUNTER — RESULT REVIEW (OUTPATIENT)
Age: 70
End: 2025-03-12

## 2025-03-12 ENCOUNTER — APPOINTMENT (OUTPATIENT)
Dept: OTOLARYNGOLOGY | Facility: CLINIC | Age: 70
End: 2025-03-12

## 2025-03-12 VITALS — HEIGHT: 65 IN | BODY MASS INDEX: 20.99 KG/M2 | WEIGHT: 126 LBS

## 2025-03-12 VITALS — DIASTOLIC BLOOD PRESSURE: 77 MMHG | HEART RATE: 98 BPM | SYSTOLIC BLOOD PRESSURE: 123 MMHG

## 2025-03-12 DIAGNOSIS — J31.0 CHRONIC RHINITIS: ICD-10-CM

## 2025-03-12 DIAGNOSIS — J01.91 ACUTE RECURRENT SINUSITIS, UNSPECIFIED: ICD-10-CM

## 2025-03-12 DIAGNOSIS — R04.0 EPISTAXIS: ICD-10-CM

## 2025-03-12 DIAGNOSIS — J34.2 DEVIATED NASAL SEPTUM: ICD-10-CM

## 2025-03-12 DIAGNOSIS — R51.9 HEADACHE, UNSPECIFIED: ICD-10-CM

## 2025-03-12 DIAGNOSIS — H60.8X2 OTHER OTITIS EXTERNA, LEFT EAR: ICD-10-CM

## 2025-03-12 PROCEDURE — 31231 NASAL ENDOSCOPY DX: CPT

## 2025-03-12 PROCEDURE — 99214 OFFICE O/P EST MOD 30 MIN: CPT | Mod: 25

## 2025-03-12 PROCEDURE — 70486 CT MAXILLOFACIAL W/O DYE: CPT

## 2025-03-12 RX ORDER — MUPIROCIN 20 MG/G
2 OINTMENT TOPICAL 3 TIMES DAILY
Qty: 1 | Refills: 4 | Status: ACTIVE | COMMUNITY
Start: 2025-03-12 | End: 1900-01-01

## 2025-03-24 NOTE — ED ADULT NURSE NOTE - SKIN CAPILLARY REFILL
Anesthesia Evaluation     Patient summary reviewed and Nursing notes reviewed   no history of anesthetic complications:   NPO Solid Status: > 8 hours  NPO Liquid Status: > 8 hours           Airway   Mallampati: I  TM distance: >3 FB  Neck ROM: full  No difficulty expected  Dental    (+) edentulous    Pulmonary - normal exam   Cardiovascular     Rhythm: irregular  Rate: normal    (+) dysrhythmias      Neuro/Psych  GI/Hepatic/Renal/Endo    (+) diabetes mellitus type 2 well controlled    Musculoskeletal     Abdominal  - normal exam    Bowel sounds: normal.   Substance History      OB/GYN          Other                    Anesthesia Plan    ASA 3     general   total IV anesthesia  intravenous induction     Anesthetic plan, risks, benefits, and alternatives have been provided, discussed and informed consent has been obtained with: patient.    Plan discussed with attending.    CODE STATUS:         
2 seconds or less

## 2025-04-11 ENCOUNTER — NON-APPOINTMENT (OUTPATIENT)
Age: 70
End: 2025-04-11

## 2025-04-11 ENCOUNTER — APPOINTMENT (OUTPATIENT)
Dept: CARDIOLOGY | Facility: CLINIC | Age: 70
End: 2025-04-11
Payer: MEDICARE

## 2025-04-11 VITALS
BODY MASS INDEX: 21.16 KG/M2 | WEIGHT: 127 LBS | HEART RATE: 92 BPM | HEIGHT: 65 IN | OXYGEN SATURATION: 97 % | SYSTOLIC BLOOD PRESSURE: 136 MMHG | DIASTOLIC BLOOD PRESSURE: 79 MMHG

## 2025-04-11 DIAGNOSIS — I34.1 NONRHEUMATIC MITRAL (VALVE) PROLAPSE: ICD-10-CM

## 2025-04-11 DIAGNOSIS — R00.2 PALPITATIONS: ICD-10-CM

## 2025-04-11 DIAGNOSIS — I48.0 PAROXYSMAL ATRIAL FIBRILLATION: ICD-10-CM

## 2025-04-11 PROCEDURE — 99214 OFFICE O/P EST MOD 30 MIN: CPT

## 2025-04-11 PROCEDURE — 93000 ELECTROCARDIOGRAM COMPLETE: CPT

## 2025-04-11 RX ORDER — DILTIAZEM HYDROCHLORIDE 30 MG/1
30 TABLET ORAL
Qty: 60 | Refills: 0 | Status: ACTIVE | COMMUNITY
Start: 2025-04-11 | End: 1900-01-01

## 2025-05-17 ENCOUNTER — EMERGENCY (EMERGENCY)
Facility: HOSPITAL | Age: 70
LOS: 1 days | End: 2025-05-17
Attending: EMERGENCY MEDICINE | Admitting: EMERGENCY MEDICINE
Payer: MEDICARE

## 2025-05-17 VITALS
HEART RATE: 110 BPM | OXYGEN SATURATION: 96 % | DIASTOLIC BLOOD PRESSURE: 66 MMHG | WEIGHT: 125 LBS | HEIGHT: 65 IN | SYSTOLIC BLOOD PRESSURE: 118 MMHG | TEMPERATURE: 99 F | RESPIRATION RATE: 18 BRPM

## 2025-05-17 DIAGNOSIS — Z98.890 OTHER SPECIFIED POSTPROCEDURAL STATES: Chronic | ICD-10-CM

## 2025-05-17 DIAGNOSIS — Z98.89 OTHER SPECIFIED POSTPROCEDURAL STATES: Chronic | ICD-10-CM

## 2025-05-17 PROCEDURE — 99283 EMERGENCY DEPT VISIT LOW MDM: CPT

## 2025-05-17 PROCEDURE — 99284 EMERGENCY DEPT VISIT MOD MDM: CPT | Mod: FS

## 2025-05-17 PROCEDURE — 87476 LYME DIS DNA AMP PROBE: CPT

## 2025-05-17 PROCEDURE — 86618 LYME DISEASE ANTIBODY: CPT

## 2025-05-17 PROCEDURE — 36415 COLL VENOUS BLD VENIPUNCTURE: CPT

## 2025-05-17 RX ORDER — DOXYCYCLINE HYCLATE 100 MG
100 TABLET ORAL ONCE
Refills: 0 | Status: COMPLETED | OUTPATIENT
Start: 2025-05-17 | End: 2025-05-17

## 2025-05-17 RX ORDER — DOXYCYCLINE HYCLATE 100 MG
1 TABLET ORAL
Qty: 20 | Refills: 0
Start: 2025-05-17 | End: 2025-05-26

## 2025-05-17 RX ADMIN — Medication 100 MILLIGRAM(S): at 15:02

## 2025-05-17 NOTE — ED PROVIDER NOTE - SKIN, MLM
right lower lateral extremity circular 5 to 6 cm rash discolored slightly reddish-brown without any central clearing with 1 to 2 mm pustule on the peripheral no streaking normal warmth  nontender

## 2025-05-17 NOTE — ED PROVIDER NOTE - CLINICAL SUMMARY MEDICAL DECISION MAKING FREE TEXT BOX
Patient is a 69-year-old white female who developed a rash to her right leg after being in brush approximately 1 month ago.  Patient went to see dermatologist at that time and was told it may have been eczema.  Patient was feeling well but then rash developed again over the past few days to the right lower extremity at site of ???  Bite.  This most recent rash 2 days ago had what appeared to be central clearing based upon picture patient had stored on her cell phone.  Now rash is homogeneous in color throughout.  No fever no chills no nausea no vomiting no joint pain.  Case will require a thorough evaluation to be performed in an independent manner followed by medications.

## 2025-05-17 NOTE — ED PROVIDER NOTE - PATIENT PORTAL LINK FT
You can access the FollowMyHealth Patient Portal offered by [facility] by registering at the following website: [email or web address]. By joining [email or web address]’s FollowMyHealth portal, you will also be able to view your health information using other applications (apps) compatible with our system.

## 2025-05-17 NOTE — ED ADULT NURSE NOTE - NSFALLUNIVINTERV_ED_ALL_ED
Statement Selected Bed/Stretcher in lowest position, wheels locked, appropriate side rails in place/Call bell, personal items and telephone in reach/Instruct patient to call for assistance before getting out of bed/chair/stretcher/Non-slip footwear applied when patient is off stretcher/[place] to call system/Physically safe environment - no spills, clutter or unnecessary equipment/Purposeful proactive rounding/Room/bathroom lighting operational, light cord in reach

## 2025-05-17 NOTE — ED PROVIDER NOTE - NSFOLLOWUPINSTRUCTIONS_ED_ALL_ED_FT
Follow up with dermatologist and pcp  take antibiotics as directed  return to er for any worsening symptoms      Acute Rash    WHAT YOU NEED TO KNOW:    A rash is irritated, red, or itchy skin or mucus membranes, such as the lining of your nose or throat. Acute means the rash starts suddenly, worsens quickly, and lasts a short time. Common causes include a disease or infection, a reaction to something you are allergic to, or certain medicines.    DISCHARGE INSTRUCTIONS:    Return to the emergency department if:    You have sudden trouble breathing or chest pain.    You are vomiting, have a headache or muscle aches, and your throat hurts.  Call your doctor or dermatologist if:    You have a fever.    You get open wounds from scratching your skin, or you have a wound that is red, swollen, or painful.    Your rash lasts longer than 3 months.    You have swelling or pain in your joints.    You have questions or concerns about your condition or care.  Medicines: If your rash does not go away on its own, you may need the following medicines:    Antihistamines may be given to help decrease itching.    Steroids may be given to decrease inflammation.    Antibiotics help fight or prevent a bacterial infection.    Take your medicine as directed. Contact your healthcare provider if you think your medicine is not helping or if you have side effects. Tell your provider if you are allergic to any medicine. Keep a list of the medicines, vitamins, and herbs you take. Include the amounts, and when and why you take them. Bring the list or the pill bottles to follow-up visits. Carry your medicine list with you in case of an emergency.  Prevent a rash or care for your skin when you have a rash: Dry skin can lead to more problems. Do not scratch your skin if it itches. You may cause a skin infection by scratching. The following may prevent dry skin, and help your skin look better:    Help soothe your rash. Apply thick cream lotions or petroleum jelly. Cool compresses may also soothe your skin. Apply a cool compress or a cool, wet towel, and then cover it with a dry towel.    Use lukewarm water when you bathe. Hot water may damage your skin more. Pat your skin dry. Do not rub your skin with a towel.    Use detergents, soaps, shampoos, and bubble baths made for sensitive skin.    Wear clothes made of cotton instead of nylon or wool. Cotton is softer, so it will not hurt your skin as much.  Follow up with your healthcare providers as directed: A dermatologist may help find the cause of your rash or help plan or change treatment. A dietitian may help with meal planning if you have a food allergy. Write down your questions so you remember to ask them during your visits.

## 2025-05-17 NOTE — ED PROVIDER NOTE - OBJECTIVE STATEMENT
Patient past medical history of CHF hyperparathyroidism Byron's disease GERD hiatal hernia mitral valve prolapse A-fib complaining of rash on right lower leg.  Patient states she went in woods behind her house to  her granddaughters Frisbee about a month ago developed a rash the same night went to dermatologist advised he was eczema.  Patient states initially looked like a bite but now noticed some redness which had some central clearing in the pictures she took yesterday.  Patient came to emergency room concern for rabies.

## 2025-05-17 NOTE — ED ADULT TRIAGE NOTE - CHIEF COMPLAINT QUOTE
1mo ago patient entered a wooded area and developed a rash to her right ankle. Pt saw derm, who gave her cream. Ankle looks healing, Patient states she feels like she has rabies although she did not see anything bite her.

## 2025-05-17 NOTE — ED PROVIDER NOTE - DIFFERENTIAL DIAGNOSIS
Different diagnosis includes nonspecific this local wound infection versus minor cellulitis possible Lyme Differential Diagnosis

## 2025-05-17 NOTE — ED ADULT NURSE NOTE - CAS TRG GENERAL NORM CIRC DET
Problem: Pain  Goal: # Verbalizes understanding of pain management  Description: Documented in Patient Education Activity  Outcome: Outcome Met, Continue evaluating goal progress toward completion     Problem: At Risk for Falls  Goal: # Patient does not fall  Outcome: Outcome Met, Continue evaluating goal progress toward completion  Goal: # Takes action to control fall-related risks  Outcome: Outcome Met, Continue evaluating goal progress toward completion  Goal: # Verbalizes understanding of fall risk/precautions  Description: Document education using the patient education activity  Outcome: Outcome Met, Continue evaluating goal progress toward completion     Problem: At Risk for Injury Due to Fall  Goal: # Patient does not fall  Outcome: Outcome Met, Continue evaluating goal progress toward completion  Goal: # Takes action to control condition specific risks  Outcome: Outcome Met, Continue evaluating goal progress toward completion  Goal: # Verbalizes understanding of fall-related injury personal risks  Description: Document education using the patient education activity  Outcome: Outcome Met, Continue evaluating goal progress toward completion     Problem: Pain  Goal: #Acceptable pain level achieved/maintained at rest using NRS/Faces  Description: This goal is used for patients who can self-report.  Acceptable means the level is at or below the identified comfort/function goal.  Outcome: Outcome Not Met, Continue to Monitor  Goal: # Acceptable pain level achieved/maintained at rest using NRS/Faces without oversedation (opioid naive or PCA/Epidural infusion)  Description: This goal is used if Opioid-naïve or on PCA/Epidural Infusion.  Outcome: Outcome Not Met, Continue to Monitor  Goal: # Acceptable pain level achieved/maintained with activity using NRS/Faces  Description: This goal is used for patients who can self-report and are not achieving acceptable pain control during activity.  Outcome: Outcome Not Met,  Continue to Monitor  Goal: Acceptable pain/comfort level is achieved/maintained at rest (based on Pain Behaviors Scale)  Description: This goal is used for patients who are not able to self-report pain and are assessed for pain using the Pain Behaviors Scale  Outcome: Outcome Not Met, Continue to Monitor      Strong peripheral pulses

## 2025-05-17 NOTE — ED PROVIDER NOTE - ATTENDING APP SHARED VISIT CONTRIBUTION OF CARE
Examination reveals a well-developed well-nourished white female in no acute distress with a circular 5 to 6 cm rash discolored slightly reddish-brown without any central clearing with 1 to 2 mm pustule on the periphery.  rash itself was completely nontender and there was no lymphangitis extending from this rash.  I agree with plan and management outlined by PA.

## 2025-05-18 LAB
B BURGDOR C6 AB SER-ACNC: NEGATIVE — SIGNIFICANT CHANGE UP
B BURGDOR IGG+IGM SER-ACNC: 0.33 INDEX — SIGNIFICANT CHANGE UP (ref 0.01–0.9)

## 2025-05-21 ENCOUNTER — APPOINTMENT (OUTPATIENT)
Dept: OTOLARYNGOLOGY | Facility: CLINIC | Age: 70
End: 2025-05-21

## 2025-05-21 LAB — B BURGDOR DNA SPEC QL NAA+PROBE: NEGATIVE — SIGNIFICANT CHANGE UP

## 2025-05-27 ENCOUNTER — EMERGENCY (EMERGENCY)
Facility: HOSPITAL | Age: 70
LOS: 1 days | End: 2025-05-27
Attending: STUDENT IN AN ORGANIZED HEALTH CARE EDUCATION/TRAINING PROGRAM | Admitting: STUDENT IN AN ORGANIZED HEALTH CARE EDUCATION/TRAINING PROGRAM
Payer: MEDICARE

## 2025-05-27 VITALS
TEMPERATURE: 98 F | HEART RATE: 101 BPM | WEIGHT: 125 LBS | DIASTOLIC BLOOD PRESSURE: 68 MMHG | RESPIRATION RATE: 20 BRPM | SYSTOLIC BLOOD PRESSURE: 126 MMHG | OXYGEN SATURATION: 98 % | HEIGHT: 65 IN

## 2025-05-27 DIAGNOSIS — Z98.89 OTHER SPECIFIED POSTPROCEDURAL STATES: Chronic | ICD-10-CM

## 2025-05-27 DIAGNOSIS — Z98.890 OTHER SPECIFIED POSTPROCEDURAL STATES: Chronic | ICD-10-CM

## 2025-05-27 PROCEDURE — 99283 EMERGENCY DEPT VISIT LOW MDM: CPT | Mod: FS

## 2025-05-27 PROCEDURE — 99282 EMERGENCY DEPT VISIT SF MDM: CPT

## 2025-05-27 NOTE — ED PROVIDER NOTE - CLINICAL SUMMARY MEDICAL DECISION MAKING FREE TEXT BOX
Patient is a 69y old  Female who presents with a chief complaint of muscle cramps. patient came in as well because 1 month ago noticed a rash on her ankle after going into the woods to get her family members frisbee. she was worried about various disease. she was already prophylactically treated for lyme with doxy, and followed up with ID for other testing. she then became concerned about the possibility of rabies despite never actually being bitten but she saw a raccoon on that same day 1 month ago. her rash has since completely healed    PE: Patient is well appearing, no acute distress, no signs of head trauma, lungs clear bilaterally, abdomen is soft and nontender to palpation without guarding or rebound, normal pulses in all extremities, no current rash or wound    Patient was reassured that without an actual bite, no indication for rabies prophylaxis at this time. she was offered blood work for her muscle cramps however she declined as she had recent blood work and states it was all normal, she wants to go home. The Patient will be discharged home at this time.  All questions were answered at this time.     Patient was told to follow up with their PCP within the next 2 days. they were told to return to the ED if they have any other concerns    patient will be discharged home at this time, they are in agreement with the plan

## 2025-05-27 NOTE — ED ADULT NURSE NOTE - NSFALLHARMRISKINTERV_ED_ALL_ED

## 2025-05-27 NOTE — ED ADULT NURSE NOTE - NS ED NURSE DC INFO COMPLEXITY
Pt nurse from home health called and said pt said that the memantine makes her feel funny they wanted to know if there's anything you can do about it    CareGiver Number [phone] Simple: Patient demonstrates quick and easy understanding

## 2025-05-27 NOTE — ED PROVIDER NOTE - OBJECTIVE STATEMENT
Patient is a 69-year-old female with past medical history of CHF, hyperparathyroidism, Milwaukee's disease, GERD, hiatal hernia, MVP, A-fib presents with muscle spasms.  Patient reports over a month ago she went into the woods behind her house to  her daughter's Frisbee.  Patient developed a rash on her right ankle at this time and was seen by dermatologist who advised her to eczema.  Patient reports initially like a bite and noticed some redness which had some central clearing.  Patient then was seen in the emergency room on May 17 for possible rabies because she saw a raccoon on her front porch but was not bit.  Patient had labs without acute findings at this time.  Patient was then seen at [facility] where she had basic labs as well without acute findings.  Patient then was seen by Dr. ventura infectious disease where she had further blood work with unknown findings.  Patient called her PCP office today due to concern for rabies who referred her to ED for evaluation.  Patient denies fever chest pain shortness breath nausea vomiting rash has since resolved

## 2025-05-27 NOTE — ED PROVIDER NOTE - PATIENT PORTAL LINK FT
You can access the FollowMyHealth Patient Portal offered by [facility] by registering at the following website: [email or web address]. By joining [employer]’s FollowMyHealth portal, you will also be able to view your health information using other applications (apps) compatible with our system.

## 2025-05-27 NOTE — ED PROVIDER NOTE - NSFOLLOWUPINSTRUCTIONS_ED_ALL_ED_FT
Muscle Spasm    WHAT YOU NEED TO KNOW:    A muscle spasm is a sudden contraction of any muscle or group of muscles. A muscle cramp is a painful muscle spasm. Muscle cramps commonly occur after intense exercise or during pregnancy. They may also be caused by certain medications, dehydration, low calcium or magnesium levels, or another medical condition.    DISCHARGE INSTRUCTIONS:    Medicines: You may need the following:    NSAIDs help decrease swelling and pain or fever. This medicine is available with or without a doctor's order. NSAIDs can cause stomach bleeding or kidney problems in certain people. If you take blood thinner medicine, always ask your healthcare provider if NSAIDs are safe for you. Always read the medicine label and follow directions.    Take your medicine as directed. Contact your healthcare provider if you think your medicine is not helping or if you have side effects. Tell your provider if you are allergic to any medicine. Keep a list of the medicines, vitamins, and herbs you take. Include the amounts, and when and why you take them. Bring the list or the pill bottles to follow-up visits. Carry your medicine list with you in case of an emergency.  Follow up with your healthcare provider as directed: You may need other tests or treatment. You may also be referred to a physical therapist or other specialist. Write down your questions so you remember to ask them during your visits.    Self-care:    Stretch your muscle to help relieve the cramp. It may be helpful to keep your muscle in the stretched position until the cramp is gone.    Apply heat to help decrease pain and muscle spasms. Apply heat on the area for 20 to 30 minutes every 2 hours for as many days as directed.    Apply ice to help decrease swelling and pain. Ice may also help prevent tissue damage. Use an ice pack, or put crushed ice in a plastic bag. Cover it with a towel and place it on your muscle for 15 to 20 minutes every hour or as directed.    Drink more liquids to help prevent muscle cramps caused by dehydration. Sports drinks may help replace electrolytes you lose through sweat during exercise. Ask your healthcare provider how much liquid to drink each day and which liquids are best for you.    Eat healthy foods, such as fruits, vegetables, whole grains, low-fat dairy products, and lean proteins (meat, beans, and fish). If you are pregnant, ask your healthcare provider about foods that are high in magnesium and sodium. They may help to relieve cramps during pregnancy.    Massage your muscle to help relieve the cramp.    Take frequent deep breaths until the cramp feels better. Lie down while you take the deep breaths so you do not get dizzy or lightheaded.  Contact your healthcare provider if:    You have signs of dehydration, such as a headache, dark yellow urine, dry eyes or mouth, or a fast heartbeat.    You have questions or concerns about your condition or care.  Return to the emergency department if:    You have warmth, swelling, or redness in the cramping muscle.    You have frequent or unrelieved muscle cramps in several different muscles.    You have muscle cramps with numbness, tingling, and burning in your hands and feet.  © [employer] 1973, 2025    	  back to top            © [employer] 1973, 2025

## 2025-05-27 NOTE — ED ADULT NURSE NOTE - ED STAT RN HANDOFF TIME
20:46 Albendazole Pregnancy And Lactation Text: This medication is Pregnancy Category C and it isn't known if it is safe during pregnancy. It is also excreted in breast milk.

## 2025-05-27 NOTE — ED ADULT TRIAGE NOTE - CHIEF COMPLAINT QUOTE
pt ambulated into the ED without difficulty C/O muscular twitching of right lower extremity. states she was evaluated a few weeks ago for rash to right lower extremity that has since resolved.

## 2025-05-27 NOTE — ED PROVIDER NOTE - PROVIDER TOKENS
[address] [facility]  [place] [address] [address]  Phone: [address]    Chief Complaint   Patient presents with    Hand Pain     c/o pain rt. middle finger, no know trauma       HPI    Moe Cortes is a 49 y.o. male who presents above-noted complaint. Patient has right middle finger issue. Is been going on for couple years. Sometimes he has to straighten out himself sometimes he has to force it to the straight position and then he feels a pull and pain. Denies other new symptoms at this time such as weakness trauma other injuries or problems.     PAST MEDICAL HISTORY    Past Medical History:   Diagnosis Date    Arthritis     CAD (coronary artery disease)     [facility]     Depression     Diabetes mellitus ([facility]     GERD (gastroesophageal reflux disease)     Hyperlipidemia     Hypertension     Hypothyroidism     Obstructive sleep apnea     no CPAP    RLS (restless legs syndrome)     TIA (transient ischemic attack)        SURGICAL HISTORY    Past Surgical History:   Procedure Laterality Date    APPENDECTOMY  1982    COLONOSCOPY Left 10/31/2017    DIAGNOSTIC CARDIAC CATH LAB PROCEDURE  05/29/2014    [facility] has had total of 3 caths    ESOPHAGEAL DILATATION      KNEE ARTHROSCOPY Left 08/2018    ligament repair and cyst removal    NECK SURGERY  06/2016    Rodriguez spurs removed and fusion    ID COLSC FLX W/RMVL OF TUMOR POLYP LESION SNARE TQ N/A 10/31/2017    COLONOSCOPY POLYPECTOMY SNARE/COLD BIOPSY performed by Mars Lyle MD at [address] [facility] NEUROSTIM/ N/A 2/2/2018    T8-9 SPINAL CORD STIMULATOR IMPLANT performed by Amelia Villafuerte MD at [address] AND ADENOIDECTOMY      TRANSTHORACIC ECHOCARDIOGRAM  10-08-10    left ventricle size was normal systolic function was normal. ejection fraction was estimated in the range of 55 Relationship status: None    Intimate partner violence     Fear of current or ex partner: None     Emotionally abused: None     Physically abused: None     Forced sexual activity: None   Other Topics Concern    None   Social History Narrative    None       REVIEW OF SYSTEMS    Positive for finger pain hand pain. No trauma. No redness  All systems negative except as marked. PHYSICAL EXAM    VITAL SIGNS: BP (!) 123/58   Pulse 54   Temp 96.7 °F (35.9 °C) (Temporal)   Resp 16   Ht 5' 11\" (1.803 m)   Wt 268 lb (121.6 kg)   SpO2 97%   BMI 37.38 kg/m²    Constitutional:  Alert not toxtic or ill, able to give coherent history  HENT:  Normocephalic, Atraumatic  Cervical Spine: Normal range of motion,  No stridor. Eyes:  No discharge or  Swelling  Respiratory: No respiratory distress  Musculoskeletal:  Intact distal pulses,     There is some pain discomfort along the right palm especially on the flexor mechanism. There is no redness there is no swelling. There are some dry scales on the extensor surface which she states is common. Capillary refill is brisk. Is actually full range of motion although pain with forced flexion. RADIOLOGY    No orders to display       PROCEDURES    none      CONSULTS:  None        ED COURSE & MEDICAL DECISION MAKING    Pertinent Labs & Imaging studies reviewed. (See chart for details)  Patient has what appears to be a trigger finger. Neurovascular exam is otherwise normal.  Sounds like he is actually stretched his own flexor mechanisms. Will have outpatient rest medical referral.  Nothing else to do splint wise or trauma wise. Nothing to suggest infection. He may take Tylenol or Motrin over-the-counter for pain.     SCREENINGS  BP (!) 123/58   Pulse 54   Temp 96.7 °F (35.9 °C) (Temporal)   Resp 16   Ht 5' 11\" (1.803 m)   Wt 268 lb (121.6 kg)   SpO2 97%   BMI 37.38 kg/m²      No orders to display       Screening For Hypertension and Follow-up (#317) previously diagnosed with hypertension and not applicable for screen      Screening For Tobacco Use and Cessation Intervention (#226):   reports that he has been smoking cigarettes. He started smoking about 45 years ago. He has a 21.00 pack-year smoking history. He has never used smokeless tobacco.  Tobacco cessation encouraged with brief counseling. Written home care instructions for smoking cessation provided. FINAL IMPRESSION    1.  Trigger middle finger of right hand         PATIENT REFERRED TO:  Harvey Hernández MD  [place] [address]  [phone]    Call   For evaluation      DISCHARGE MEDICATIONS:  New Prescriptions    No medications on file           Bailee Goodson MD  03/28/21 1778 PROVIDER:[TOKEN:[[id number]],FOLLOWUP:[Urgent]]

## 2025-05-27 NOTE — ED ADULT NURSE NOTE - OBJECTIVE STATEMENT
Pt received in RH 69y female AXO 4 is ambulatory from home c/o muscle pain. pt states she has muscular twitching of right lower extremity. Pt states she was evaluated a few weeks ago for rash to right lower extremity that has since resolved.

## 2025-06-01 ENCOUNTER — EMERGENCY (EMERGENCY)
Facility: HOSPITAL | Age: 70
LOS: 1 days | End: 2025-06-01
Attending: INTERNAL MEDICINE | Admitting: INTERNAL MEDICINE
Payer: MEDICARE

## 2025-06-01 VITALS
OXYGEN SATURATION: 97 % | HEART RATE: 94 BPM | DIASTOLIC BLOOD PRESSURE: 80 MMHG | RESPIRATION RATE: 18 BRPM | WEIGHT: 125 LBS | SYSTOLIC BLOOD PRESSURE: 142 MMHG | TEMPERATURE: 98 F | HEIGHT: 65 IN

## 2025-06-01 VITALS
RESPIRATION RATE: 18 BRPM | OXYGEN SATURATION: 98 % | DIASTOLIC BLOOD PRESSURE: 81 MMHG | HEART RATE: 84 BPM | TEMPERATURE: 98 F | SYSTOLIC BLOOD PRESSURE: 137 MMHG

## 2025-06-01 VITALS
WEIGHT: 125 LBS | HEART RATE: 110 BPM | RESPIRATION RATE: 20 BRPM | HEIGHT: 65 IN | SYSTOLIC BLOOD PRESSURE: 133 MMHG | TEMPERATURE: 97 F | DIASTOLIC BLOOD PRESSURE: 75 MMHG | OXYGEN SATURATION: 97 %

## 2025-06-01 DIAGNOSIS — Z98.890 OTHER SPECIFIED POSTPROCEDURAL STATES: Chronic | ICD-10-CM

## 2025-06-01 DIAGNOSIS — Z98.89 OTHER SPECIFIED POSTPROCEDURAL STATES: Chronic | ICD-10-CM

## 2025-06-01 LAB
ALBUMIN SERPL ELPH-MCNC: 3.5 G/DL — SIGNIFICANT CHANGE UP (ref 3.3–5)
ALP SERPL-CCNC: 46 U/L — SIGNIFICANT CHANGE UP (ref 40–120)
ALT FLD-CCNC: 20 U/L — SIGNIFICANT CHANGE UP (ref 12–78)
ANION GAP SERPL CALC-SCNC: 10 MMOL/L — SIGNIFICANT CHANGE UP (ref 5–17)
APPEARANCE UR: CLEAR — SIGNIFICANT CHANGE UP
APTT BLD: 26.5 SEC — SIGNIFICANT CHANGE UP (ref 26.1–36.8)
AST SERPL-CCNC: 17 U/L — SIGNIFICANT CHANGE UP (ref 15–37)
BASOPHILS # BLD AUTO: 0.05 K/UL — SIGNIFICANT CHANGE UP (ref 0–0.2)
BASOPHILS NFR BLD AUTO: 0.8 % — SIGNIFICANT CHANGE UP (ref 0–2)
BILIRUB SERPL-MCNC: 0.2 MG/DL — SIGNIFICANT CHANGE UP (ref 0.2–1.2)
BILIRUB UR-MCNC: NEGATIVE — SIGNIFICANT CHANGE UP
BUN SERPL-MCNC: 24 MG/DL — HIGH (ref 7–23)
CALCIUM SERPL-MCNC: 9.1 MG/DL — SIGNIFICANT CHANGE UP (ref 8.5–10.1)
CHLORIDE SERPL-SCNC: 104 MMOL/L — SIGNIFICANT CHANGE UP (ref 96–108)
CK SERPL-CCNC: 75 U/L — SIGNIFICANT CHANGE UP (ref 26–192)
CO2 SERPL-SCNC: 26 MMOL/L — SIGNIFICANT CHANGE UP (ref 22–31)
COLOR SPEC: YELLOW — SIGNIFICANT CHANGE UP
CREAT SERPL-MCNC: 0.81 MG/DL — SIGNIFICANT CHANGE UP (ref 0.5–1.3)
DIFF PNL FLD: NEGATIVE — SIGNIFICANT CHANGE UP
EGFR: 80 ML/MIN/1.73M2 — SIGNIFICANT CHANGE UP
EGFR: 80 ML/MIN/1.73M2 — SIGNIFICANT CHANGE UP
EOSINOPHIL # BLD AUTO: 0.05 K/UL — SIGNIFICANT CHANGE UP (ref 0–0.5)
EOSINOPHIL NFR BLD AUTO: 0.8 % — SIGNIFICANT CHANGE UP (ref 0–6)
GLUCOSE SERPL-MCNC: 85 MG/DL — SIGNIFICANT CHANGE UP (ref 70–99)
GLUCOSE UR QL: NEGATIVE MG/DL — SIGNIFICANT CHANGE UP
HCT VFR BLD CALC: 43.9 % — SIGNIFICANT CHANGE UP (ref 34.5–45)
HGB BLD-MCNC: 14.9 G/DL — SIGNIFICANT CHANGE UP (ref 11.5–15.5)
IMM GRANULOCYTES NFR BLD AUTO: 0.5 % — SIGNIFICANT CHANGE UP (ref 0–0.9)
INR BLD: 0.87 RATIO — SIGNIFICANT CHANGE UP (ref 0.85–1.16)
KETONES UR QL: NEGATIVE MG/DL — SIGNIFICANT CHANGE UP
LACTATE SERPL-SCNC: 0.9 MMOL/L — SIGNIFICANT CHANGE UP (ref 0.7–2)
LEUKOCYTE ESTERASE UR-ACNC: NEGATIVE — SIGNIFICANT CHANGE UP
LIDOCAIN IGE QN: 38 U/L — SIGNIFICANT CHANGE UP (ref 13–75)
LYMPHOCYTES # BLD AUTO: 1.96 K/UL — SIGNIFICANT CHANGE UP (ref 1–3.3)
LYMPHOCYTES # BLD AUTO: 30.8 % — SIGNIFICANT CHANGE UP (ref 13–44)
MCHC RBC-ENTMCNC: 29.9 PG — SIGNIFICANT CHANGE UP (ref 27–34)
MCHC RBC-ENTMCNC: 33.9 G/DL — SIGNIFICANT CHANGE UP (ref 32–36)
MCV RBC AUTO: 88.2 FL — SIGNIFICANT CHANGE UP (ref 80–100)
MONOCYTES # BLD AUTO: 0.54 K/UL — SIGNIFICANT CHANGE UP (ref 0–0.9)
MONOCYTES NFR BLD AUTO: 8.5 % — SIGNIFICANT CHANGE UP (ref 2–14)
NEUTROPHILS # BLD AUTO: 3.73 K/UL — SIGNIFICANT CHANGE UP (ref 1.8–7.4)
NEUTROPHILS NFR BLD AUTO: 58.6 % — SIGNIFICANT CHANGE UP (ref 43–77)
NITRITE UR-MCNC: NEGATIVE — SIGNIFICANT CHANGE UP
NRBC BLD AUTO-RTO: 0 /100 WBCS — SIGNIFICANT CHANGE UP (ref 0–0)
PH UR: 5 — SIGNIFICANT CHANGE UP (ref 5–8)
PLATELET # BLD AUTO: 285 K/UL — SIGNIFICANT CHANGE UP (ref 150–400)
POTASSIUM SERPL-MCNC: 3.7 MMOL/L — SIGNIFICANT CHANGE UP (ref 3.5–5.3)
POTASSIUM SERPL-SCNC: 3.7 MMOL/L — SIGNIFICANT CHANGE UP (ref 3.5–5.3)
PROT SERPL-MCNC: 6.9 G/DL — SIGNIFICANT CHANGE UP (ref 6–8.3)
PROT UR-MCNC: NEGATIVE MG/DL — SIGNIFICANT CHANGE UP
PROTHROM AB SERPL-ACNC: 10.3 SEC — SIGNIFICANT CHANGE UP (ref 9.9–13.4)
RBC # BLD: 4.98 M/UL — SIGNIFICANT CHANGE UP (ref 3.8–5.2)
RBC # FLD: 13.1 % — SIGNIFICANT CHANGE UP (ref 10.3–14.5)
SODIUM SERPL-SCNC: 140 MMOL/L — SIGNIFICANT CHANGE UP (ref 135–145)
SP GR SPEC: 1.03 — SIGNIFICANT CHANGE UP (ref 1–1.03)
UROBILINOGEN FLD QL: 0.2 MG/DL — SIGNIFICANT CHANGE UP (ref 0.2–1)
WBC # BLD: 6.36 K/UL — SIGNIFICANT CHANGE UP (ref 3.8–10.5)
WBC # FLD AUTO: 6.36 K/UL — SIGNIFICANT CHANGE UP (ref 3.8–10.5)

## 2025-06-01 PROCEDURE — 36415 COLL VENOUS BLD VENIPUNCTURE: CPT

## 2025-06-01 PROCEDURE — 99285 EMERGENCY DEPT VISIT HI MDM: CPT | Mod: 25

## 2025-06-01 PROCEDURE — 85730 THROMBOPLASTIN TIME PARTIAL: CPT

## 2025-06-01 PROCEDURE — 83605 ASSAY OF LACTIC ACID: CPT

## 2025-06-01 PROCEDURE — 71045 X-RAY EXAM CHEST 1 VIEW: CPT

## 2025-06-01 PROCEDURE — 82550 ASSAY OF CK (CPK): CPT

## 2025-06-01 PROCEDURE — 71045 X-RAY EXAM CHEST 1 VIEW: CPT | Mod: 26

## 2025-06-01 PROCEDURE — 99285 EMERGENCY DEPT VISIT HI MDM: CPT | Mod: FS

## 2025-06-01 PROCEDURE — 93005 ELECTROCARDIOGRAM TRACING: CPT

## 2025-06-01 PROCEDURE — 81003 URINALYSIS AUTO W/O SCOPE: CPT

## 2025-06-01 PROCEDURE — 80053 COMPREHEN METABOLIC PANEL: CPT

## 2025-06-01 PROCEDURE — 93010 ELECTROCARDIOGRAM REPORT: CPT | Mod: 76

## 2025-06-01 PROCEDURE — 93010 ELECTROCARDIOGRAM REPORT: CPT

## 2025-06-01 PROCEDURE — 85610 PROTHROMBIN TIME: CPT

## 2025-06-01 PROCEDURE — 85025 COMPLETE CBC W/AUTO DIFF WBC: CPT

## 2025-06-01 PROCEDURE — 87040 BLOOD CULTURE FOR BACTERIA: CPT

## 2025-06-01 PROCEDURE — 99284 EMERGENCY DEPT VISIT MOD MDM: CPT | Mod: FS

## 2025-06-01 PROCEDURE — 83690 ASSAY OF LIPASE: CPT

## 2025-06-01 RX ORDER — ALPRAZOLAM 0.5 MG
0.25 TABLET, EXTENDED RELEASE 24 HR ORAL ONCE
Refills: 0 | Status: DISCONTINUED | OUTPATIENT
Start: 2025-06-01 | End: 2025-06-01

## 2025-06-01 RX ADMIN — Medication 1750 MILLILITER(S): at 18:24

## 2025-06-01 NOTE — ED PROVIDER NOTE - OBJECTIVE STATEMENT
69-year-old female with past medical history of [place]'s disease presents today for the second ED visit due to palpitations.  Patient was seen earlier as she was concerned about a rash that she has had for weeks.  Patient has been seen twice in the ED for that right leg rash.  Patient initially thought that she needed rabies vaccine though she did not have any specific exposure.  Patient was also concerned for Lyme's disease in which she got treatment with doxycycline and her Lyme's disease test was negative.  Patient notes that her rash improved.  Patient has been feeling generalized fatigue.  Patient today went home after discharge in which she had a [employer] burger and felt like her heart was racing.  Patient reports that she used to be on Cardizem.  Patient denies chest pain, shortness of breath, fever, or any other complaints.

## 2025-06-01 NOTE — ED ADULT NURSE NOTE - OBJECTIVE STATEMENT
Pt received in bed alert and oriented and resting in bed with the c/o body aches and pain along with, chills. Pt states that she was on Doxycycline for possible lyme disease with no relief. As per MD's orders IV nicolás placed blood specimen obtained and sent to the lab. Pt stable and nursing care ongoing and safety maintained.

## 2025-06-01 NOTE — ED PROVIDER NOTE - OBJECTIVE STATEMENT
69-year-old female with past medical history of [place]'s disease presents today due to a rash.  Patient reports that she was seen twice prior within this month due to a rash to her right lower extremity.  Patient initially believed that she might of been exposed to rabies but did not have any specific bite.  Patient then got testing for Lyme's disease which was negative.  Patient followed up with infectious disease and took 2 weeks worth of doxycycline.  Patient notes that her rash improved.  Patient admits to experiencing some nausea, generalized weakness, and admits to some chills.  Patient denies fever, vomiting, abdominal pain, chest pain, shortness of breath, cough, runny nose, sore throat, or any other complaints.

## 2025-06-01 NOTE — ED PROVIDER NOTE - PATIENT PORTAL LINK FT
You can access the FollowMyHealth Patient Portal offered by [facility] by registering at the following website: [email or web address]. By joining [employer]’s FollowMyHealth portal, you will also be able to view your health information using other applications (apps) compatible with our system.

## 2025-06-01 NOTE — ED PROVIDER NOTE - NSFOLLOWUPINSTRUCTIONS_ED_ALL_ED_FT
Follow up with your primary care doctor, Infectious Disease, and Rheumatology. Return for fever, vomiting, chest pain, shortness of breath, dizziness, worsening condition.

## 2025-06-01 NOTE — ED ADULT NURSE NOTE - OBJECTIVE STATEMENT
Pt received aao3, room air, vs as charted. Pt presents with Sinus Tach w/ pacs. Denies chest pain, sob, palpitations at this time. Endorses having a heavy meal and taking an antiacid then C/o increased heart rate to 235 with home cardiac monitor. mild b/l lower edema. Pt oriented to unit and staff. Safety maintained.

## 2025-06-01 NOTE — ED ADULT TRIAGE NOTE - CHIEF COMPLAINT QUOTE
Patient BIBA from home complaining of palpitations and tachycardia that started earlier this evening states home monitor read up to 200 but subsided, at this time denies CP, SOB at this time.

## 2025-06-01 NOTE — ED PROVIDER NOTE - CLINICAL SUMMARY MEDICAL DECISION MAKING FREE TEXT BOX
69-year-old female with past medical history of [place]'s disease presents today due to a rash. no fever, no toxemia, no headache, no chest pain, no SOB, no palpitations, no n/v,  no Focal  neuro changes. VSS Exam stable, labs are normal, stable for DC to OP care 69-year-old female with past medical history of [place]'s disease presents today due to a rash. no fever, no toxemia, no headache, no chest pain, no SOB, no palpitations, no n/v,  no Focal  neuro changes. VSS Exam stable, labs are normal, EKG non acute , stable for DC to OP care

## 2025-06-01 NOTE — ED PROVIDER NOTE - CLINICAL SUMMARY MEDICAL DECISION MAKING FREE TEXT BOX
69-year-old female with past medical history of [place]'s disease presents today for the second ED visit due to palpitations. VSS Exam stable EKG is normal, Earlier lab work up and EKG were normal. Patient was treated with anxiolytics, observed and found stable for DC.

## 2025-06-01 NOTE — ED PROVIDER NOTE - PROVIDER TOKENS
PROVIDER:[TOKEN:[[id number]],FOLLOWUP:[1-3 Days]],PROVIDER:[TOKEN:[[id number]],FOLLOWUP:[1-3 Days]]

## 2025-06-01 NOTE — ED ADULT NURSE NOTE - CHPI ED NUR DURATION
I have reviewed the history and physical examination. I was present for key portions of the visit and agree with the assessment and plan as documented above by Dr. Perry for 6 yr old well child check.  Concerns: None. Growth appropriate.  Milestones appropriate.  Immunizations updated.  Age-appropriate anticipatory guidance given.     Pepito Barnett MD  May 19, 2023  10:13 AM       month(s)

## 2025-06-01 NOTE — ED PROVIDER NOTE - CARE PROVIDER_API CALL
Chin Goodwin  Cardiovascular Disease  [address] [place] [address]  Phone: [phone]  Fax: [phone]  Follow Up Time: 1-3 Days    Rojas Bolden  Cardiovascular Disease  [address] [place] [address]  Phone: [phone]  Fax: [phone]  Follow Up Time: 1-3 Days

## 2025-06-01 NOTE — ED PROVIDER NOTE - NS ED MD DISPO DISCHARGE
[Cardiac Auscultation] : normal cardiac auscultation  [Peripheral Pulses] : positive peripheral pulses [Respiratory Effort] : normal respiratory effort [Auscultation] : lungs clear to auscultation [Normal] : normal [Grossly Intact] : grossly intact [Refer to Joint Diagram Below] : refer to joint diagram below [3] : 3 [_______] : Ankle: [unfilled] [Rash] : no rash [Peripheral Edema] : no peripheral edema  [Tenderness] : non tender [FreeTextEntry1] : minimally verbal, very upset and crying on exam, minimally cooperative [de-identified] : limited exam as patient extremely upset, crying and unable to cooperate fully, antalgic/stiff gait Home

## 2025-06-01 NOTE — ED PROVIDER NOTE - NSFOLLOWUPINSTRUCTIONS_ED_ALL_ED_FT
Follow up with your Cardiologist tomorrow.     SEEK IMMEDIATE MEDICAL CARE IF YOU HAVE ANY OF THE FOLLOWING SYMPTOMS: worsening chest pain, coughing up blood, unexplained back/neck/jaw pain, severe abdominal pain, dizziness or lightheadedness, fainting, shortness of breath, sweaty or clammy skin, vomiting, or racing heart beat. These symptoms may represent a serious problem that is an emergency. Do not wait to see if the symptoms will go away. Get medical help right away. Call 911 and do not drive yourself to the hospital.

## 2025-06-01 NOTE — ED PROVIDER NOTE - CARE PROVIDER_API CALL
Wesley Rodas.  Rheumatology  [address] [place] [address]  Phone: [phone]  Fax: [phone]  Follow Up Time: 1-3 Days

## 2025-06-01 NOTE — ED ADULT NURSE REASSESSMENT NOTE - NS ED NURSE REASSESS COMMENT FT1
Pt received by JIM De La Garza. Introduced self to pt and updated pt on plan of care. Pt verbalized understanding. Pt awaiting for lab and Xray results. Pt verbalizing understanding. Bed at lowest height, call bell within reach, AOx4, endorsing generalized minor pain.

## 2025-06-01 NOTE — ED ADULT NURSE NOTE - ED STAT RN HANDOFF DETAILS
Pt feels improvement of symptoms. Denies any chest pain, sob, palpitations, lightheadedness, or dizziness. Pt educated on discharge instructions, verbalized understanding. Safety maintained.

## 2025-06-01 NOTE — ED PROVIDER NOTE - PATIENT PORTAL LINK FT
You can access the FollowMyHealth Patient Portal offered by [facility] by registering at the following website: [email or web address]. By joining [employer]’s FollowMyHealth portal, you will also be able to view your health information using other applications (apps) compatible with our system.

## 2025-06-02 VITALS
TEMPERATURE: 98 F | HEART RATE: 97 BPM | RESPIRATION RATE: 17 BRPM | OXYGEN SATURATION: 97 % | DIASTOLIC BLOOD PRESSURE: 80 MMHG | SYSTOLIC BLOOD PRESSURE: 133 MMHG

## 2025-06-02 PROCEDURE — 93005 ELECTROCARDIOGRAM TRACING: CPT

## 2025-06-02 PROCEDURE — 99283 EMERGENCY DEPT VISIT LOW MDM: CPT | Mod: 25

## 2025-06-02 RX ADMIN — Medication 0.25 MILLIGRAM(S): at 00:01

## 2025-06-06 LAB
CULTURE RESULTS: SIGNIFICANT CHANGE UP
CULTURE RESULTS: SIGNIFICANT CHANGE UP
SPECIMEN SOURCE: SIGNIFICANT CHANGE UP
SPECIMEN SOURCE: SIGNIFICANT CHANGE UP

## 2025-06-18 ENCOUNTER — APPOINTMENT (OUTPATIENT)
Dept: CARDIOLOGY | Facility: CLINIC | Age: 70
End: 2025-06-18
Payer: MEDICARE

## 2025-06-18 VITALS
DIASTOLIC BLOOD PRESSURE: 84 MMHG | SYSTOLIC BLOOD PRESSURE: 129 MMHG | HEART RATE: 88 BPM | BODY MASS INDEX: 20.16 KG/M2 | HEIGHT: 65 IN | WEIGHT: 121 LBS | OXYGEN SATURATION: 96 %

## 2025-06-18 PROCEDURE — 93000 ELECTROCARDIOGRAM COMPLETE: CPT

## 2025-06-18 PROCEDURE — 93306 TTE W/DOPPLER COMPLETE: CPT

## 2025-06-18 PROCEDURE — 99214 OFFICE O/P EST MOD 30 MIN: CPT

## 2025-07-05 ENCOUNTER — EMERGENCY (EMERGENCY)
Facility: HOSPITAL | Age: 70
LOS: 1 days | End: 2025-07-05
Attending: STUDENT IN AN ORGANIZED HEALTH CARE EDUCATION/TRAINING PROGRAM | Admitting: STUDENT IN AN ORGANIZED HEALTH CARE EDUCATION/TRAINING PROGRAM
Payer: MEDICARE

## 2025-07-05 VITALS
TEMPERATURE: 98 F | OXYGEN SATURATION: 97 % | HEIGHT: 65 IN | RESPIRATION RATE: 20 BRPM | SYSTOLIC BLOOD PRESSURE: 148 MMHG | HEART RATE: 87 BPM | WEIGHT: 119.93 LBS | DIASTOLIC BLOOD PRESSURE: 82 MMHG

## 2025-07-05 DIAGNOSIS — Z98.890 OTHER SPECIFIED POSTPROCEDURAL STATES: Chronic | ICD-10-CM

## 2025-07-05 DIAGNOSIS — Z98.89 OTHER SPECIFIED POSTPROCEDURAL STATES: Chronic | ICD-10-CM

## 2025-07-05 LAB
ALBUMIN SERPL ELPH-MCNC: 3.3 G/DL — SIGNIFICANT CHANGE UP (ref 3.3–5)
ALP SERPL-CCNC: 45 U/L — SIGNIFICANT CHANGE UP (ref 40–120)
ALT FLD-CCNC: 21 U/L — SIGNIFICANT CHANGE UP (ref 12–78)
ANION GAP SERPL CALC-SCNC: 7 MMOL/L — SIGNIFICANT CHANGE UP (ref 5–17)
APPEARANCE UR: CLEAR — SIGNIFICANT CHANGE UP
AST SERPL-CCNC: 14 U/L — LOW (ref 15–37)
BASOPHILS # BLD AUTO: 0.06 K/UL — SIGNIFICANT CHANGE UP (ref 0–0.2)
BASOPHILS NFR BLD AUTO: 0.8 % — SIGNIFICANT CHANGE UP (ref 0–2)
BILIRUB SERPL-MCNC: 0.4 MG/DL — SIGNIFICANT CHANGE UP (ref 0.2–1.2)
BILIRUB UR-MCNC: NEGATIVE — SIGNIFICANT CHANGE UP
BUN SERPL-MCNC: 22 MG/DL — SIGNIFICANT CHANGE UP (ref 7–23)
CALCIUM SERPL-MCNC: 8.9 MG/DL — SIGNIFICANT CHANGE UP (ref 8.5–10.1)
CHLORIDE SERPL-SCNC: 104 MMOL/L — SIGNIFICANT CHANGE UP (ref 96–108)
CO2 SERPL-SCNC: 27 MMOL/L — SIGNIFICANT CHANGE UP (ref 22–31)
COLOR SPEC: YELLOW — SIGNIFICANT CHANGE UP
CREAT SERPL-MCNC: 0.77 MG/DL — SIGNIFICANT CHANGE UP (ref 0.5–1.3)
DIFF PNL FLD: NEGATIVE — SIGNIFICANT CHANGE UP
EGFR: 84 ML/MIN/1.73M2 — SIGNIFICANT CHANGE UP
EGFR: 84 ML/MIN/1.73M2 — SIGNIFICANT CHANGE UP
EOSINOPHIL # BLD AUTO: 0.03 K/UL — SIGNIFICANT CHANGE UP (ref 0–0.5)
EOSINOPHIL NFR BLD AUTO: 0.4 % — SIGNIFICANT CHANGE UP (ref 0–6)
GLUCOSE SERPL-MCNC: 85 MG/DL — SIGNIFICANT CHANGE UP (ref 70–99)
GLUCOSE UR QL: NEGATIVE MG/DL — SIGNIFICANT CHANGE UP
HCT VFR BLD CALC: 41.3 % — SIGNIFICANT CHANGE UP (ref 34.5–45)
HGB BLD-MCNC: 13.7 G/DL — SIGNIFICANT CHANGE UP (ref 11.5–15.5)
IMM GRANULOCYTES # BLD AUTO: 0.03 K/UL — SIGNIFICANT CHANGE UP (ref 0–0.07)
IMM GRANULOCYTES NFR BLD AUTO: 0.4 % — SIGNIFICANT CHANGE UP (ref 0–0.9)
KETONES UR QL: ABNORMAL MG/DL
LEUKOCYTE ESTERASE UR-ACNC: NEGATIVE — SIGNIFICANT CHANGE UP
LIDOCAIN IGE QN: 27 U/L — SIGNIFICANT CHANGE UP (ref 13–75)
LYMPHOCYTES # BLD AUTO: 2.14 K/UL — SIGNIFICANT CHANGE UP (ref 1–3.3)
LYMPHOCYTES NFR BLD AUTO: 29 % — SIGNIFICANT CHANGE UP (ref 13–44)
MCHC RBC-ENTMCNC: 29.7 PG — SIGNIFICANT CHANGE UP (ref 27–34)
MCHC RBC-ENTMCNC: 33.2 G/DL — SIGNIFICANT CHANGE UP (ref 32–36)
MCV RBC AUTO: 89.4 FL — SIGNIFICANT CHANGE UP (ref 80–100)
MONOCYTES # BLD AUTO: 0.55 K/UL — SIGNIFICANT CHANGE UP (ref 0–0.9)
MONOCYTES NFR BLD AUTO: 7.5 % — SIGNIFICANT CHANGE UP (ref 2–14)
NEUTROPHILS # BLD AUTO: 4.57 K/UL — SIGNIFICANT CHANGE UP (ref 1.8–7.4)
NEUTROPHILS NFR BLD AUTO: 61.9 % — SIGNIFICANT CHANGE UP (ref 43–77)
NITRITE UR-MCNC: NEGATIVE — SIGNIFICANT CHANGE UP
NRBC # BLD AUTO: 0 K/UL — SIGNIFICANT CHANGE UP (ref 0–0)
NRBC # FLD: 0 K/UL — SIGNIFICANT CHANGE UP (ref 0–0)
NRBC BLD AUTO-RTO: 0 /100 WBCS — SIGNIFICANT CHANGE UP (ref 0–0)
PH UR: 5.5 — SIGNIFICANT CHANGE UP (ref 5–8)
PLATELET # BLD AUTO: 297 K/UL — SIGNIFICANT CHANGE UP (ref 150–400)
PMV BLD: 9.6 FL — SIGNIFICANT CHANGE UP (ref 7–13)
POTASSIUM SERPL-MCNC: 3.6 MMOL/L — SIGNIFICANT CHANGE UP (ref 3.5–5.3)
POTASSIUM SERPL-SCNC: 3.6 MMOL/L — SIGNIFICANT CHANGE UP (ref 3.5–5.3)
PROT SERPL-MCNC: 6.4 G/DL — SIGNIFICANT CHANGE UP (ref 6–8.3)
PROT UR-MCNC: NEGATIVE MG/DL — SIGNIFICANT CHANGE UP
RBC # BLD: 4.62 M/UL — SIGNIFICANT CHANGE UP (ref 3.8–5.2)
RBC # FLD: 12.5 % — SIGNIFICANT CHANGE UP (ref 10.3–14.5)
SODIUM SERPL-SCNC: 138 MMOL/L — SIGNIFICANT CHANGE UP (ref 135–145)
SP GR SPEC: 1.02 — SIGNIFICANT CHANGE UP (ref 1–1.03)
UROBILINOGEN FLD QL: 0.2 MG/DL — SIGNIFICANT CHANGE UP (ref 0.2–1)
WBC # BLD: 7.38 K/UL — SIGNIFICANT CHANGE UP (ref 3.8–10.5)
WBC # FLD AUTO: 7.38 K/UL — SIGNIFICANT CHANGE UP (ref 3.8–10.5)

## 2025-07-05 PROCEDURE — 71045 X-RAY EXAM CHEST 1 VIEW: CPT | Mod: 26

## 2025-07-05 PROCEDURE — 83690 ASSAY OF LIPASE: CPT

## 2025-07-05 PROCEDURE — 99285 EMERGENCY DEPT VISIT HI MDM: CPT | Mod: 25

## 2025-07-05 PROCEDURE — 36415 COLL VENOUS BLD VENIPUNCTURE: CPT

## 2025-07-05 PROCEDURE — 99284 EMERGENCY DEPT VISIT MOD MDM: CPT | Mod: FS

## 2025-07-05 PROCEDURE — 96374 THER/PROPH/DIAG INJ IV PUSH: CPT

## 2025-07-05 PROCEDURE — 93010 ELECTROCARDIOGRAM REPORT: CPT

## 2025-07-05 PROCEDURE — 93005 ELECTROCARDIOGRAM TRACING: CPT

## 2025-07-05 PROCEDURE — 81003 URINALYSIS AUTO W/O SCOPE: CPT

## 2025-07-05 PROCEDURE — 85025 COMPLETE CBC W/AUTO DIFF WBC: CPT

## 2025-07-05 PROCEDURE — 71045 X-RAY EXAM CHEST 1 VIEW: CPT

## 2025-07-05 PROCEDURE — 80053 COMPREHEN METABOLIC PANEL: CPT

## 2025-07-05 RX ORDER — ONDANSETRON HCL/PF 4 MG/2 ML
4 VIAL (ML) INJECTION ONCE
Refills: 0 | Status: COMPLETED | OUTPATIENT
Start: 2025-07-05 | End: 2025-07-05

## 2025-07-05 RX ADMIN — Medication 2000 MILLILITER(S): at 18:57

## 2025-07-05 RX ADMIN — Medication 4 MILLIGRAM(S): at 18:57

## 2025-07-05 NOTE — ED PROVIDER NOTE - CLINICAL SUMMARY MEDICAL DECISION MAKING FREE TEXT BOX
No. DONAVON screening performed.  STOP BANG Legend: 0-2 = LOW Risk; 3-4 = INTERMEDIATE Risk; 5-8 = HIGH Risk Patient is a 69y old  Female who presents with a chief complaint of generalized fatigue and some unintentional weight loss. she reports symptoms for 6 weeks, states the last time this occurred her addisons was acting up. she saw her endocrinologist who advised increasing her steroid dosage which she did but she was still concerned and wanted blood work performed.    PE: Patient is well appearing, no acute distress, no signs of head trauma, lungs clear bilaterally, abdomen is soft and nontender to palpation without guarding or rebound, normal pulses in all extremities    ddx: anxiety, viral illness, anemia, RL, dehydration    patient appears very well, she also appears very anxious, likely anxiety related. will perform labs and otherwise will give close outpatient follow up

## 2025-07-05 NOTE — ED PROVIDER NOTE - PROGRESS NOTE DETAILS
KIKO Rose: Results reviewed–no acute findings.  Patient reassured.  UA results unremarkable.  Chest x-ray images reviewed with Dr. Marquez–no acute finding.  Will DC with instructions to follow-up with her PCP as scheduled

## 2025-07-05 NOTE — ED PROVIDER NOTE - PATIENT PORTAL LINK FT
You can access the FollowMyHealth Patient Portal offered by [facility] by registering at the following website: [email or web address]. By joining [employer]’s FollowMyHealth portal, you will also be able to view your health information using other applications (apps) compatible with our system.

## 2025-07-05 NOTE — ED ADULT NURSE NOTE - OBJECTIVE STATEMENT
Pt received in rm 10B 69y female AXO 4 is ambulatory from home c/o chief complaint quote. Pt states she developed, nausea, generalized aches and pains, and headaches for "a few weeks". Pt has a history of noni's disease, pt told by endocrinologist to double up on hydrocortisone. Upon assessment pt c/o nausea, weakness, and anxiety. Right 20 AC placed, labs drawn, meds given as prescribed. pending results.

## 2025-07-05 NOTE — ED PROVIDER NOTE - AVIAN FLU SYMPTOMS
The access site was successfully closed using a (SYSTEM CLSR VASCADE MVP 6-12FR VNS - [id number]) closure device. LFV  sheaths x 2 removed and vascade x 2 deployed . Purse string sutures applied with 2-0 Vicryl. No

## 2025-07-05 NOTE — ED PROVIDER NOTE - PHYSICAL EXAMINATION
Gen: Well appearing in NAD.   Eyes: PERRLA, EOMI   Head: atraumatic  Heart: s1/s2, RRR  Lung: CTA b/l,  Abd: soft, NT/ND, NO RLQ TTP no rebound or guarding, NCVAT  Msk: no pedal edema or calf pain, pt ambulatory   Neuro: AAO x3, patient moving all extremity equally, no focal neuro deficits noted  Skin: Normal for race.   Psych: Calm and cooperative

## 2025-07-05 NOTE — ED PROVIDER NOTE - NSFOLLOWUPINSTRUCTIONS_ED_ALL_ED_FT
Follow up with your primary care physician as scheduled. Take the copy of your test results you were given in the emergency room for your doctor to review.     Stay hydrated    Return to the ER if your symptoms worsen or for any other medical emergencies

## 2025-07-05 NOTE — ED ADULT TRIAGE NOTE - CHIEF COMPLAINT QUOTE
pt ambulated into the ED C/O nausea, generalized aches and pains, and headaches for "a few weeks". hx noni's disease, pt told by endocrinologist to double up on hydrocortisone. pt expressing feelings of anxiety, emotional support provided.

## 2025-07-05 NOTE — ED PROVIDER NOTE - TOBACCO USE
notified by RN that patient got up without assistance and had unwitnessed fall. On exam Awake, Alert, [place] strongly, denies HA/Neck pain or head strike. L rib skin delamination. Ordered for chest and neck xr as well as cth Unknown if ever smoked

## 2025-07-05 NOTE — ED PROVIDER NOTE - OBJECTIVE STATEMENT
69-year-old female with past medical history of CHF, hyperparathyroidism, [place]'s disease, GERD, hiatal hernia, MVP Presents to the ED with multiple complaints of nausea, body aches, fatigue x 6 weeks. Patient recently saw her endocrinologist a few days ago regarding the symptoms he instructed her to double her hydrocortisone which she has been, and had labs in his office which were unremarkable.  Patient has an appointment with her PCP in 1 week but today felt like she could not wait to be seen,  Denies f/c, n/v/d, abd pain, CP, SOB, urinary sympts, URI sympts,  sick contacts or all other complaints

## 2025-07-17 ENCOUNTER — APPOINTMENT (OUTPATIENT)
Dept: RHEUMATOLOGY | Facility: CLINIC | Age: 70
End: 2025-07-17
Payer: MEDICARE

## 2025-07-17 VITALS
BODY MASS INDEX: 20.58 KG/M2 | WEIGHT: 123.5 LBS | HEIGHT: 65 IN | RESPIRATION RATE: 17 BRPM | TEMPERATURE: 98.6 F | OXYGEN SATURATION: 96 % | DIASTOLIC BLOOD PRESSURE: 71 MMHG | SYSTOLIC BLOOD PRESSURE: 146 MMHG | HEART RATE: 83 BPM

## 2025-07-17 PROCEDURE — G2211 COMPLEX E/M VISIT ADD ON: CPT

## 2025-07-17 PROCEDURE — 99205 OFFICE O/P NEW HI 60 MIN: CPT

## 2025-07-18 LAB
ALBUMIN SERPL ELPH-MCNC: 4.1 G/DL
ALP BLD-CCNC: 51 U/L
ALT SERPL-CCNC: 24 U/L
ANION GAP SERPL CALC-SCNC: 14 MMOL/L
AST SERPL-CCNC: 35 U/L
BASOPHILS # BLD AUTO: 0.02 K/UL
BASOPHILS NFR BLD AUTO: 0.2 %
BILIRUB SERPL-MCNC: 0.3 MG/DL
BUN SERPL-MCNC: 21 MG/DL
C3 SERPL-MCNC: 102 MG/DL
C4 SERPL-MCNC: 18 MG/DL
CALCIUM SERPL-MCNC: 9.8 MG/DL
CCP AB SER IA-ACNC: <8 U/ML
CCP AB SER QL: NEGATIVE
CHLORIDE SERPL-SCNC: 103 MMOL/L
CK SERPL-CCNC: 710 U/L
CO2 SERPL-SCNC: 23 MMOL/L
CREAT SERPL-MCNC: 0.72 MG/DL
CRP SERPL-MCNC: <3 MG/L
EGFRCR SERPLBLD CKD-EPI 2021: 92 ML/MIN/1.73M2
EOSINOPHIL # BLD AUTO: 0 K/UL
EOSINOPHIL NFR BLD AUTO: 0 %
ERYTHROCYTE [SEDIMENTATION RATE] IN BLOOD BY WESTERGREN METHOD: 2 MM/HR
GLUCOSE SERPL-MCNC: 110 MG/DL
HCT VFR BLD CALC: 46 %
HGB BLD-MCNC: 14.6 G/DL
IMM GRANULOCYTES NFR BLD AUTO: 0.4 %
LYMPHOCYTES # BLD AUTO: 0.9 K/UL
LYMPHOCYTES NFR BLD AUTO: 10.9 %
MAN DIFF?: NORMAL
MCHC RBC-ENTMCNC: 30.4 PG
MCHC RBC-ENTMCNC: 31.7 G/DL
MCV RBC AUTO: 95.6 FL
MONOCYTES # BLD AUTO: 0.21 K/UL
MONOCYTES NFR BLD AUTO: 2.5 %
NEUTROPHILS # BLD AUTO: 7.09 K/UL
NEUTROPHILS NFR BLD AUTO: 86 %
PLATELET # BLD AUTO: 305 K/UL
POTASSIUM SERPL-SCNC: 3.8 MMOL/L
PROT SERPL-MCNC: 6.5 G/DL
RBC # BLD: 4.81 M/UL
RBC # FLD: 13.4 %
RHEUMATOID FACT SERPL-ACNC: 12 IU/ML
SODIUM SERPL-SCNC: 140 MMOL/L
TSH SERPL-ACNC: 1.12 UIU/ML
WBC # FLD AUTO: 8.25 K/UL

## 2025-07-19 LAB
ANA SCREEN BY IMMUNOASSAY: NEGATIVE
CENTROMERE B AB SER-ACNC: <0.2 AL
CHROMATIN AB SERPL-ACNC: <0.2 AL
DSDNA AB SER-ACNC: <1 IU/ML
ENA JO1 AB SER-ACNC: <0.2 AL
ENA RNP AB SER-ACNC: 0.2 AL
ENA SCL70 AB SER-ACNC: <0.2 AL
ENA SM AB SER-ACNC: <0.2 AL
ENA SS-A AB SER-ACNC: <0.2 AL
ENA SS-B AB SER-ACNC: <0.2 AL
RIBOSOMAL P AB SER-ACNC: <0.2 AL

## 2025-07-21 LAB — ALDOLASE SERPL-CCNC: 12.2 U/L

## 2025-07-24 ENCOUNTER — APPOINTMENT (OUTPATIENT)
Dept: OTOLARYNGOLOGY | Facility: CLINIC | Age: 70
End: 2025-07-24

## 2025-07-25 ENCOUNTER — APPOINTMENT (OUTPATIENT)
Dept: OTOLARYNGOLOGY | Facility: CLINIC | Age: 70
End: 2025-07-25
Payer: MEDICARE

## 2025-07-25 VITALS
BODY MASS INDEX: 20.83 KG/M2 | SYSTOLIC BLOOD PRESSURE: 171 MMHG | HEART RATE: 94 BPM | DIASTOLIC BLOOD PRESSURE: 88 MMHG | WEIGHT: 125 LBS | HEIGHT: 65 IN

## 2025-07-25 DIAGNOSIS — H60.503 UNSPECIFIED ACUTE NONINFECTIVE OTITIS EXTERNA, BILATERAL: ICD-10-CM

## 2025-07-25 PROCEDURE — 99213 OFFICE O/P EST LOW 20 MIN: CPT

## 2025-07-25 RX ORDER — CIPROFLOXACIN AND DEXAMETHASONE 3; 1 MG/ML; MG/ML
0.3-0.1 SUSPENSION/ DROPS AURICULAR (OTIC) TWICE DAILY
Qty: 1 | Refills: 1 | Status: ACTIVE | COMMUNITY
Start: 2025-07-25 | End: 1900-01-01

## 2025-07-31 ENCOUNTER — APPOINTMENT (OUTPATIENT)
Dept: RHEUMATOLOGY | Facility: CLINIC | Age: 70
End: 2025-07-31
Payer: MEDICARE

## 2025-07-31 VITALS
WEIGHT: 122.19 LBS | RESPIRATION RATE: 17 BRPM | OXYGEN SATURATION: 97 % | HEART RATE: 89 BPM | SYSTOLIC BLOOD PRESSURE: 156 MMHG | TEMPERATURE: 98.1 F | BODY MASS INDEX: 20.36 KG/M2 | HEIGHT: 65 IN | DIASTOLIC BLOOD PRESSURE: 80 MMHG

## 2025-07-31 DIAGNOSIS — M25.50 PAIN IN UNSPECIFIED JOINT: ICD-10-CM

## 2025-07-31 DIAGNOSIS — M79.7 FIBROMYALGIA: ICD-10-CM

## 2025-07-31 DIAGNOSIS — Z72.820 SLEEP DEPRIVATION: ICD-10-CM

## 2025-07-31 DIAGNOSIS — M79.10 MYALGIA, UNSPECIFIED SITE: ICD-10-CM

## 2025-07-31 PROCEDURE — 99214 OFFICE O/P EST MOD 30 MIN: CPT

## 2025-07-31 PROCEDURE — G2211 COMPLEX E/M VISIT ADD ON: CPT

## 2025-07-31 RX ORDER — GABAPENTIN 300 MG/1
300 CAPSULE ORAL
Qty: 30 | Refills: 0 | Status: ACTIVE | COMMUNITY
Start: 2025-07-31 | End: 1900-01-01

## 2025-08-01 LAB — CK SERPL-CCNC: 61 U/L

## 2025-08-01 RX ORDER — GABAPENTIN 100 MG/1
100 CAPSULE ORAL
Qty: 30 | Refills: 3 | Status: ACTIVE | COMMUNITY
Start: 2025-08-01 | End: 1900-01-01

## 2025-08-06 ENCOUNTER — APPOINTMENT (OUTPATIENT)
Dept: OTOLARYNGOLOGY | Facility: CLINIC | Age: 70
End: 2025-08-06
Payer: MEDICARE

## 2025-08-06 VITALS
HEIGHT: 65 IN | HEART RATE: 92 BPM | DIASTOLIC BLOOD PRESSURE: 79 MMHG | WEIGHT: 120 LBS | BODY MASS INDEX: 19.99 KG/M2 | SYSTOLIC BLOOD PRESSURE: 129 MMHG

## 2025-08-06 DIAGNOSIS — M26.609 UNSPECIFIED TEMPOROMANDIBULAR JOINT DISORDER: ICD-10-CM

## 2025-08-06 DIAGNOSIS — K21.9 GASTRO-ESOPHAGEAL REFLUX DISEASE W/OUT ESOPHAGITIS: ICD-10-CM

## 2025-08-06 PROCEDURE — 99213 OFFICE O/P EST LOW 20 MIN: CPT | Mod: 25

## 2025-08-06 PROCEDURE — 31575 DIAGNOSTIC LARYNGOSCOPY: CPT

## 2025-08-15 LAB
EJ AB SER QL: NEGATIVE
ENA JO1 AB SER IA-ACNC: <20 UNITS
ENA PM/SCL AB SER-ACNC: <20 UNITS
ENA SM+RNP AB SER IA-ACNC: <20 UNITS
ENA SS-A IGG SER QL: <20 UNITS
FIBRILLARIN AB SER QL: NEGATIVE
KU AB SER QL: NEGATIVE
MDA-5 (P140)(CADM-140): <20 UNITS
MI2 AB SER QL: NEGATIVE
NXP-2 (P140): <20 UNITS
OJ AB SER QL: NEGATIVE
PL12 AB SER QL: NEGATIVE
PL7 AB SER QL: NEGATIVE
SRP AB SERPL QL: NEGATIVE
TIF GAMMA (P155/140): <20 UNITS
U2 SNRNP AB SER QL: NEGATIVE

## 2025-09-08 ENCOUNTER — APPOINTMENT (OUTPATIENT)
Dept: OTOLARYNGOLOGY | Facility: CLINIC | Age: 70
End: 2025-09-08
Payer: MEDICARE

## 2025-09-08 VITALS
DIASTOLIC BLOOD PRESSURE: 85 MMHG | HEIGHT: 65 IN | SYSTOLIC BLOOD PRESSURE: 124 MMHG | HEART RATE: 86 BPM | WEIGHT: 112 LBS | BODY MASS INDEX: 18.66 KG/M2

## 2025-09-08 DIAGNOSIS — K12.1 OTHER FORMS OF STOMATITIS: ICD-10-CM

## 2025-09-08 DIAGNOSIS — I63.9 CEREBRAL INFARCTION, UNSPECIFIED: ICD-10-CM

## 2025-09-08 DIAGNOSIS — J34.2 DEVIATED NASAL SEPTUM: ICD-10-CM

## 2025-09-08 DIAGNOSIS — K12.30 OTHER FORMS OF STOMATITIS: ICD-10-CM

## 2025-09-08 DIAGNOSIS — R43.2 PARAGEUSIA: ICD-10-CM

## 2025-09-08 DIAGNOSIS — R43.9 UNSPECIFIED DISTURBANCES OF SMELL AND TASTE: ICD-10-CM

## 2025-09-08 DIAGNOSIS — J31.0 CHRONIC RHINITIS: ICD-10-CM

## 2025-09-08 PROCEDURE — 92557 COMPREHENSIVE HEARING TEST: CPT

## 2025-09-08 PROCEDURE — 92570 ACOUSTIC IMMITANCE TESTING: CPT

## 2025-09-08 PROCEDURE — 99214 OFFICE O/P EST MOD 30 MIN: CPT | Mod: 25

## 2025-09-08 PROCEDURE — 31231 NASAL ENDOSCOPY DX: CPT
